# Patient Record
Sex: FEMALE | Race: WHITE | Employment: FULL TIME | URBAN - METROPOLITAN AREA
[De-identification: names, ages, dates, MRNs, and addresses within clinical notes are randomized per-mention and may not be internally consistent; named-entity substitution may affect disease eponyms.]

---

## 2018-04-25 ENCOUNTER — OFFICE VISIT (OUTPATIENT)
Dept: INTERNAL MEDICINE CLINIC | Age: 53
End: 2018-04-25
Payer: COMMERCIAL

## 2018-04-25 VITALS
HEART RATE: 74 BPM | SYSTOLIC BLOOD PRESSURE: 140 MMHG | HEIGHT: 65 IN | WEIGHT: 194.2 LBS | OXYGEN SATURATION: 98 % | DIASTOLIC BLOOD PRESSURE: 90 MMHG | TEMPERATURE: 98.2 F | RESPIRATION RATE: 16 BRPM | BODY MASS INDEX: 32.36 KG/M2

## 2018-04-25 DIAGNOSIS — J40 BRONCHITIS: Primary | ICD-10-CM

## 2018-04-25 PROCEDURE — 99213 OFFICE O/P EST LOW 20 MIN: CPT | Performed by: NURSE PRACTITIONER

## 2018-04-25 RX ORDER — DOXYCYCLINE HYCLATE 100 MG
100 TABLET ORAL 2 TIMES DAILY
Qty: 14 TABLET | Refills: 0 | Status: SHIPPED | OUTPATIENT
Start: 2018-04-25 | End: 2018-05-02

## 2018-04-25 ASSESSMENT — ENCOUNTER SYMPTOMS
RHINORRHEA: 1
NAUSEA: 0
CHEST TIGHTNESS: 0
HEMOPTYSIS: 0
SINUS PRESSURE: 1
ABDOMINAL PAIN: 0
VOMITING: 0
TROUBLE SWALLOWING: 0
COUGH: 1
SHORTNESS OF BREATH: 0
SORE THROAT: 0
DIARRHEA: 0
WHEEZING: 0
HEARTBURN: 0

## 2018-04-25 ASSESSMENT — PATIENT HEALTH QUESTIONNAIRE - PHQ9
SUM OF ALL RESPONSES TO PHQ9 QUESTIONS 1 & 2: 0
2. FEELING DOWN, DEPRESSED OR HOPELESS: 0
SUM OF ALL RESPONSES TO PHQ QUESTIONS 1-9: 0
1. LITTLE INTEREST OR PLEASURE IN DOING THINGS: 0

## 2022-11-03 ENCOUNTER — OUTSIDE SERVICES (OUTPATIENT)
Dept: NEUROLOGY | Age: 57
End: 2022-11-03
Payer: COMMERCIAL

## 2022-11-03 DIAGNOSIS — G56.03 BILATERAL CARPAL TUNNEL SYNDROME: Primary | ICD-10-CM

## 2022-11-03 PROCEDURE — 95886 MUSC TEST DONE W/N TEST COMP: CPT | Performed by: PSYCHIATRY & NEUROLOGY

## 2022-11-03 PROCEDURE — 95913 NRV CNDJ TEST 13/> STUDIES: CPT | Performed by: PSYCHIATRY & NEUROLOGY

## 2023-02-28 ENCOUNTER — DOCUMENTATION (OUTPATIENT)
Dept: PRIMARY CARE | Facility: CLINIC | Age: 58
End: 2023-02-28
Payer: COMMERCIAL

## 2023-03-15 PROBLEM — I48.0 PAROXYSMAL ATRIAL FIBRILLATION WITH RVR (MULTI): Status: ACTIVE | Noted: 2023-03-15

## 2023-03-15 PROBLEM — M21.40 FLAT FOOT: Status: ACTIVE | Noted: 2023-03-15

## 2023-03-15 PROBLEM — M99.9 NONALLOPATHIC LESION OF SACROILIAC REGION: Status: ACTIVE | Noted: 2023-03-15

## 2023-03-15 PROBLEM — E78.5 HYPERLIPIDEMIA: Status: ACTIVE | Noted: 2023-03-15

## 2023-03-15 PROBLEM — G47.33 OBSTRUCTIVE SLEEP APNEA: Status: ACTIVE | Noted: 2023-03-15

## 2023-03-15 PROBLEM — G89.29 NECK PAIN, CHRONIC: Status: ACTIVE | Noted: 2023-03-15

## 2023-03-15 PROBLEM — R53.1 WEAKNESS GENERALIZED: Status: ACTIVE | Noted: 2023-03-15

## 2023-03-15 PROBLEM — M19.071 OSTEOARTHRITIS OF ANKLE, RIGHT: Status: ACTIVE | Noted: 2023-03-15

## 2023-03-15 PROBLEM — M54.2 NECK PAIN, CHRONIC: Status: ACTIVE | Noted: 2023-03-15

## 2023-03-15 PROBLEM — G43.909 MIGRAINES: Status: ACTIVE | Noted: 2023-03-15

## 2023-03-15 PROBLEM — M25.579 ANKLE PAIN: Status: ACTIVE | Noted: 2023-03-15

## 2023-03-15 PROBLEM — M54.9 BACK PAIN: Status: ACTIVE | Noted: 2023-03-15

## 2023-03-15 PROBLEM — D05.11 DUCTAL CARCINOMA IN SITU (DCIS) OF RIGHT BREAST: Status: ACTIVE | Noted: 2023-03-15

## 2023-03-15 PROBLEM — I49.5 SINUS NODE DYSFUNCTION (MULTI): Status: ACTIVE | Noted: 2023-03-15

## 2023-03-15 PROBLEM — R00.2 PALPITATIONS: Status: ACTIVE | Noted: 2023-03-15

## 2023-03-15 PROBLEM — I10 ESSENTIAL HYPERTENSION: Status: ACTIVE | Noted: 2023-03-15

## 2023-03-15 PROBLEM — I87.2 VENOUS INSUFFICIENCY: Status: ACTIVE | Noted: 2023-03-15

## 2023-03-15 PROBLEM — L60.0 INGROWN TOENAIL: Status: ACTIVE | Noted: 2023-03-15

## 2023-03-15 PROBLEM — I89.0 LYMPHEDEMA OF BOTH LOWER EXTREMITIES: Status: ACTIVE | Noted: 2023-03-15

## 2023-03-15 PROBLEM — M54.12 LEFT CERVICAL RADICULOPATHY: Status: ACTIVE | Noted: 2023-03-15

## 2023-03-15 PROBLEM — R09.81 SINUS CONGESTION: Status: ACTIVE | Noted: 2023-03-15

## 2023-03-15 PROBLEM — R09.82 PND (POST-NASAL DRIP): Status: ACTIVE | Noted: 2023-03-15

## 2023-03-15 PROBLEM — R10.9 ABDOMINAL PAIN: Status: ACTIVE | Noted: 2023-03-15

## 2023-03-15 PROBLEM — L85.3 XEROSIS OF SKIN: Status: ACTIVE | Noted: 2023-03-15

## 2023-03-15 PROBLEM — I89.0 LYMPHEDEMA: Status: ACTIVE | Noted: 2023-03-15

## 2023-03-15 PROBLEM — G56.02 CARPAL TUNNEL SYNDROME OF LEFT WRIST: Status: ACTIVE | Noted: 2023-03-15

## 2023-03-15 PROBLEM — G89.18 POST-OP PAIN: Status: ACTIVE | Noted: 2023-03-15

## 2023-03-15 PROBLEM — R91.8 LUNG NODULES: Status: ACTIVE | Noted: 2023-03-15

## 2023-03-15 PROBLEM — F06.4 ANXIETY DISORDER DUE TO KNOWN PHYSIOLOGICAL CONDITION: Status: ACTIVE | Noted: 2023-03-15

## 2023-03-22 ENCOUNTER — TELEPHONE (OUTPATIENT)
Dept: PRIMARY CARE | Facility: CLINIC | Age: 58
End: 2023-03-22
Payer: COMMERCIAL

## 2023-03-22 DIAGNOSIS — E78.5 HYPERLIPIDEMIA, UNSPECIFIED HYPERLIPIDEMIA TYPE: Primary | ICD-10-CM

## 2023-03-23 RX ORDER — ROSUVASTATIN CALCIUM 5 MG/1
5 TABLET, COATED ORAL DAILY
Qty: 90 TABLET | Refills: 0 | Status: SHIPPED | OUTPATIENT
Start: 2023-03-23 | End: 2023-05-19

## 2023-03-23 RX ORDER — ROSUVASTATIN CALCIUM 5 MG/1
1 TABLET, COATED ORAL DAILY
COMMUNITY
Start: 2021-12-07 | End: 2023-03-23 | Stop reason: SDUPTHER

## 2023-04-12 ENCOUNTER — OFFICE VISIT (OUTPATIENT)
Dept: PRIMARY CARE | Facility: CLINIC | Age: 58
End: 2023-04-12
Payer: COMMERCIAL

## 2023-04-12 VITALS
HEIGHT: 65 IN | DIASTOLIC BLOOD PRESSURE: 78 MMHG | TEMPERATURE: 98.4 F | OXYGEN SATURATION: 98 % | RESPIRATION RATE: 16 BRPM | BODY MASS INDEX: 34.49 KG/M2 | HEART RATE: 60 BPM | WEIGHT: 207 LBS | SYSTOLIC BLOOD PRESSURE: 100 MMHG

## 2023-04-12 DIAGNOSIS — M99.9 NONALLOPATHIC LESION OF SACROILIAC REGION: ICD-10-CM

## 2023-04-12 DIAGNOSIS — M54.50 ACUTE RIGHT-SIDED LOW BACK PAIN WITHOUT SCIATICA: ICD-10-CM

## 2023-04-12 DIAGNOSIS — B37.2 MONILIAL INTERTRIGO: Primary | ICD-10-CM

## 2023-04-12 DIAGNOSIS — E66.09 CLASS 1 OBESITY DUE TO EXCESS CALORIES WITH SERIOUS COMORBIDITY AND BODY MASS INDEX (BMI) OF 34.0 TO 34.9 IN ADULT: ICD-10-CM

## 2023-04-12 PROCEDURE — 98925 OSTEOPATH MANJ 1-2 REGIONS: CPT | Performed by: FAMILY MEDICINE

## 2023-04-12 PROCEDURE — 3074F SYST BP LT 130 MM HG: CPT | Performed by: FAMILY MEDICINE

## 2023-04-12 PROCEDURE — 3008F BODY MASS INDEX DOCD: CPT | Performed by: FAMILY MEDICINE

## 2023-04-12 PROCEDURE — 99214 OFFICE O/P EST MOD 30 MIN: CPT | Performed by: FAMILY MEDICINE

## 2023-04-12 PROCEDURE — 1036F TOBACCO NON-USER: CPT | Performed by: FAMILY MEDICINE

## 2023-04-12 PROCEDURE — 3078F DIAST BP <80 MM HG: CPT | Performed by: FAMILY MEDICINE

## 2023-04-12 RX ORDER — MICONAZOLE NITRATE 2 %
POWDER (GRAM) TOPICAL 2 TIMES DAILY
Qty: 85 G | Refills: 11 | Status: SHIPPED | OUTPATIENT
Start: 2023-04-12

## 2023-04-12 RX ORDER — APIXABAN 5 MG/1
1 TABLET, FILM COATED ORAL 2 TIMES DAILY
COMMUNITY
Start: 2019-10-08 | End: 2024-01-29 | Stop reason: SDUPTHER

## 2023-04-12 RX ORDER — LANOLIN ALCOHOL/MO/W.PET/CERES
1 CREAM (GRAM) TOPICAL DAILY
COMMUNITY
Start: 2019-10-08 | End: 2024-01-29 | Stop reason: SDUPTHER

## 2023-04-12 RX ORDER — ACETAMINOPHEN 500 MG
2 TABLET ORAL DAILY
COMMUNITY

## 2023-04-12 RX ORDER — TOPIRAMATE 50 MG/1
50 TABLET, FILM COATED ORAL 2 TIMES DAILY
Qty: 60 TABLET | Refills: 5 | Status: SHIPPED | OUTPATIENT
Start: 2023-04-12 | End: 2023-11-28

## 2023-04-12 RX ORDER — MOMETASONE FUROATE 1 MG/G
CREAM TOPICAL
COMMUNITY
Start: 2023-02-10

## 2023-04-12 RX ORDER — RIZATRIPTAN BENZOATE 10 MG/1
10 TABLET, ORALLY DISINTEGRATING ORAL
COMMUNITY
Start: 2014-12-08 | End: 2023-06-14 | Stop reason: ALTCHOICE

## 2023-04-12 RX ORDER — CALCIUM CARBONATE 500(1250)
1 TABLET ORAL DAILY
COMMUNITY

## 2023-04-12 RX ORDER — UBROGEPANT 100 MG/1
TABLET ORAL ONCE AS NEEDED
COMMUNITY
Start: 2021-12-21 | End: 2023-12-01 | Stop reason: SDUPTHER

## 2023-04-12 RX ORDER — VALSARTAN 320 MG/1
1 TABLET ORAL DAILY
COMMUNITY
Start: 2022-02-22 | End: 2023-05-16

## 2023-04-12 RX ORDER — METOPROLOL SUCCINATE 25 MG/1
1 TABLET, EXTENDED RELEASE ORAL NIGHTLY
COMMUNITY
Start: 2020-05-29 | End: 2024-01-29 | Stop reason: SDUPTHER

## 2023-04-12 RX ORDER — MULTIVITAMIN
1 TABLET ORAL DAILY
COMMUNITY
End: 2023-12-01 | Stop reason: WASHOUT

## 2023-04-12 RX ORDER — DOFETILIDE 0.5 MG/1
1 CAPSULE ORAL 2 TIMES DAILY
COMMUNITY
End: 2024-01-29 | Stop reason: SDUPTHER

## 2023-04-12 RX ORDER — ARTIFICIAL TEARS 1; 2; 3 MG/ML; MG/ML; MG/ML
SOLUTION/ DROPS OPHTHALMIC
COMMUNITY
End: 2023-06-14 | Stop reason: ALTCHOICE

## 2023-04-12 RX ORDER — TOPIRAMATE 25 MG/1
25 TABLET ORAL 2 TIMES DAILY
COMMUNITY
End: 2023-04-12 | Stop reason: DRUGHIGH

## 2023-04-12 ASSESSMENT — ENCOUNTER SYMPTOMS
WHEEZING: 0
COUGH: 0
SPEECH DIFFICULTY: 0
ADENOPATHY: 0
WEAKNESS: 0
VOICE CHANGE: 0
ACTIVITY CHANGE: 0
FACIAL ASYMMETRY: 0
DYSURIA: 0
CHILLS: 0
ABDOMINAL PAIN: 0
PHOTOPHOBIA: 0
PARESTHESIAS: 0
FLANK PAIN: 0
DIAPHORESIS: 0
SLEEP DISTURBANCE: 0
DIARRHEA: 0
EYE DISCHARGE: 0
ABDOMINAL DISTENTION: 0
HEMATURIA: 0
NECK STIFFNESS: 0
RHINORRHEA: 0
PERIANAL NUMBNESS: 0
POLYDIPSIA: 0
NUMBNESS: 0
PARESIS: 0
SEIZURES: 0
ARTHRALGIAS: 0
LEG PAIN: 0
APPETITE CHANGE: 0
FATIGUE: 0
BRUISES/BLEEDS EASILY: 0
DIZZINESS: 0
FEVER: 0
HALLUCINATIONS: 0
LIGHT-HEADEDNESS: 0
BLOOD IN STOOL: 0
WOUND: 0
DYSPHORIC MOOD: 0
CHOKING: 0
EYE ITCHING: 0
CONFUSION: 0
BACK PAIN: 1
FREQUENCY: 0
TROUBLE SWALLOWING: 0
NERVOUS/ANXIOUS: 0
EYE REDNESS: 0
SINUS PRESSURE: 0
BOWEL INCONTINENCE: 0
WEIGHT LOSS: 0
VOMITING: 0
TREMORS: 0
CHEST TIGHTNESS: 0
CONSTIPATION: 0
JOINT SWELLING: 0
NAUSEA: 0
UNEXPECTED WEIGHT CHANGE: 0
HEADACHES: 0
EYE PAIN: 0
AGITATION: 0
SORE THROAT: 0
TINGLING: 0

## 2023-04-12 ASSESSMENT — PAIN SCALES - GENERAL: PAINLEVEL: 3

## 2023-04-12 NOTE — PROGRESS NOTES
Subjective   Patient ID: Aimee Ponce is a 57 y.o. female who presents for Back Pain (Last week/Pt states that she thinks she may need to be adjusted again).  Back Pain  This is a new problem. The current episode started 1 to 4 weeks ago. The problem occurs constantly. The problem is unchanged. The pain is present in the sacro-iliac. The quality of the pain is described as aching and cramping. The pain does not radiate. The pain is moderate. The pain is Worse during the day. The symptoms are aggravated by bending, position and standing. Stiffness is present All day. Pertinent negatives include no abdominal pain, bladder incontinence, bowel incontinence, chest pain, dysuria, fever, headaches, leg pain, numbness, paresis, paresthesias, pelvic pain, perianal numbness, tingling, weakness or weight loss. Risk factors include obesity. She has tried walking and home exercises for the symptoms. The treatment provided mild relief.   Rash  This is a recurrent problem. The current episode started 1 to 4 weeks ago. The problem has been gradually worsening since onset. The affected locations include the torso. The rash is characterized by redness and itchiness. She was exposed to nothing. Pertinent negatives include no congestion, cough, diarrhea, eye pain, fatigue, fever, rhinorrhea, sore throat or vomiting. Treatments tried: Topical antifungal. The treatment provided mild relief.       Review of Systems   Constitutional:  Negative for activity change, appetite change, chills, diaphoresis, fatigue, fever, unexpected weight change and weight loss.   HENT:  Negative for congestion, ear pain, hearing loss, nosebleeds, postnasal drip, rhinorrhea, sinus pressure, sneezing, sore throat, tinnitus, trouble swallowing and voice change.    Eyes:  Negative for photophobia, pain, discharge, redness, itching and visual disturbance.   Respiratory:  Negative for cough, choking, chest tightness and wheezing.    Cardiovascular:  Negative for  "chest pain and leg swelling.   Gastrointestinal:  Negative for abdominal distention, abdominal pain, blood in stool, bowel incontinence, constipation, diarrhea, nausea and vomiting.   Endocrine: Negative for cold intolerance, heat intolerance, polydipsia and polyuria.   Genitourinary:  Negative for bladder incontinence, dysuria, flank pain, frequency, hematuria, pelvic pain and urgency.   Musculoskeletal:  Positive for back pain. Negative for arthralgias, joint swelling and neck stiffness.   Skin:  Positive for rash. Negative for wound.   Allergic/Immunologic: Negative for immunocompromised state.   Neurological:  Negative for dizziness, tingling, tremors, seizures, syncope, facial asymmetry, speech difficulty, weakness, light-headedness, numbness, headaches and paresthesias.   Hematological:  Negative for adenopathy. Does not bruise/bleed easily.   Psychiatric/Behavioral:  Negative for agitation, behavioral problems, confusion, dysphoric mood, hallucinations, self-injury, sleep disturbance and suicidal ideas. The patient is not nervous/anxious.        Objective   /78 (BP Location: Right arm, Patient Position: Sitting, BP Cuff Size: Adult)   Pulse 60   Temp 36.9 °C (98.4 °F) (Temporal)   Resp 16   Ht 1.651 m (5' 5\")   Wt 93.9 kg (207 lb)   SpO2 98%   BMI 34.45 kg/m²   Physical Exam  Constitutional:       General: She is not in acute distress.     Appearance: She is not ill-appearing or diaphoretic.   HENT:      Head: Normocephalic and atraumatic.      Right Ear: External ear normal.      Left Ear: External ear normal.      Nose: Nose normal. No rhinorrhea.   Eyes:      General: Lids are normal. No scleral icterus.        Right eye: No discharge.         Left eye: No discharge.      Conjunctiva/sclera: Conjunctivae normal.   Cardiovascular:      Rate and Rhythm: Normal rate and regular rhythm.      Pulses: Normal pulses.      Heart sounds: No murmur heard.  Pulmonary:      Effort: Pulmonary effort is " normal. No respiratory distress.      Breath sounds: No decreased breath sounds, wheezing, rhonchi or rales.   Abdominal:      General: Bowel sounds are normal. There is no distension.      Palpations: Abdomen is soft. There is no mass.      Tenderness: There is no abdominal tenderness. There is no guarding or rebound.   Musculoskeletal:         General: No swelling or deformity.      Cervical back: Tenderness present. No rigidity.        Back:       Right lower leg: No edema.      Left lower leg: No edema.      Comments: Tenderness reproducible at right SI and gluteus.  Asymmetry of sacral sulcus also notable on exam when compared to the left sacral sulcus   Lymphadenopathy:      Cervical: No cervical adenopathy.      Upper Body:      Right upper body: No supraclavicular adenopathy.      Left upper body: No supraclavicular adenopathy.   Skin:     General: Skin is warm and dry.      Coloration: Skin is not jaundiced or pale.      Findings: Erythema and rash present. No lesion.             Comments: Intertriginous red plaque with central clearing   Neurological:      General: No focal deficit present.      Mental Status: She is alert and oriented to person, place, and time.      Sensory: No sensory deficit.      Motor: No weakness or tremor.      Coordination: Coordination normal.      Gait: Gait normal.   Psychiatric:         Mood and Affect: Mood normal. Affect is not inappropriate.         Behavior: Behavior normal.         Assessment/Plan   Problem List Items Addressed This Visit          Musculoskeletal    Nonallopathic lesion of sacroiliac region    Relevant Orders    Osteopathic Manipulation       Other    Back pain    Relevant Orders    Osteopathic Manipulation     Other Visit Diagnoses       Monilial intertrigo    -  Primary    Relevant Medications    miconazole (Lotrimin AF) 2 % powder    Class 1 obesity due to excess calories with serious comorbidity and body mass index (BMI) of 34.0 to 34.9 in adult         Relevant Medications    topiramate (Topamax) 50 mg tablet

## 2023-05-16 DIAGNOSIS — I10 ESSENTIAL (PRIMARY) HYPERTENSION: ICD-10-CM

## 2023-05-16 RX ORDER — VALSARTAN 320 MG/1
TABLET ORAL
Qty: 90 TABLET | Refills: 1 | Status: SHIPPED | OUTPATIENT
Start: 2023-05-16 | End: 2023-11-28

## 2023-05-19 DIAGNOSIS — E78.5 HYPERLIPIDEMIA, UNSPECIFIED HYPERLIPIDEMIA TYPE: ICD-10-CM

## 2023-05-19 DIAGNOSIS — I10 ESSENTIAL HYPERTENSION: Primary | ICD-10-CM

## 2023-05-19 RX ORDER — HYDRALAZINE HYDROCHLORIDE 50 MG/1
50 TABLET, FILM COATED ORAL 3 TIMES DAILY
Qty: 270 TABLET | Refills: 0 | Status: SHIPPED | OUTPATIENT
Start: 2023-05-19 | End: 2023-08-09 | Stop reason: SDUPTHER

## 2023-05-19 RX ORDER — ROSUVASTATIN CALCIUM 5 MG/1
TABLET, COATED ORAL
Qty: 90 TABLET | Refills: 0 | Status: SHIPPED | OUTPATIENT
Start: 2023-05-19 | End: 2023-08-09 | Stop reason: SDUPTHER

## 2023-05-19 RX ORDER — HYDRALAZINE HYDROCHLORIDE 50 MG/1
1 TABLET, FILM COATED ORAL 3 TIMES DAILY
COMMUNITY
Start: 2022-11-21 | End: 2023-05-19 | Stop reason: SDUPTHER

## 2023-06-02 ENCOUNTER — TELEPHONE (OUTPATIENT)
Dept: PRIMARY CARE | Facility: CLINIC | Age: 58
End: 2023-06-02
Payer: COMMERCIAL

## 2023-06-02 NOTE — TELEPHONE ENCOUNTER
Patient phones the office today w/ concerns about her left eye twitching and is concerned about the cause being some of the medications she is taking.     Patient is currently on magnesium and Vitamin supplements and her medication list is up to date in the system.     Patient is also scheduled to see TW within a couple of weeks. Please advise and contact patient at [phone]. Thank you.

## 2023-06-14 ENCOUNTER — OFFICE VISIT (OUTPATIENT)
Dept: PRIMARY CARE | Facility: CLINIC | Age: 58
End: 2023-06-14
Payer: COMMERCIAL

## 2023-06-14 VITALS
DIASTOLIC BLOOD PRESSURE: 75 MMHG | HEART RATE: 59 BPM | HEIGHT: 65 IN | BODY MASS INDEX: 34.32 KG/M2 | SYSTOLIC BLOOD PRESSURE: 121 MMHG | TEMPERATURE: 97.9 F | OXYGEN SATURATION: 95 % | WEIGHT: 206 LBS | RESPIRATION RATE: 18 BRPM

## 2023-06-14 DIAGNOSIS — Z00.00 ROUTINE GENERAL MEDICAL EXAMINATION AT A HEALTH CARE FACILITY: Primary | ICD-10-CM

## 2023-06-14 DIAGNOSIS — E78.2 MIXED HYPERLIPIDEMIA: ICD-10-CM

## 2023-06-14 DIAGNOSIS — Z78.0 POSTMENOPAUSAL: ICD-10-CM

## 2023-06-14 DIAGNOSIS — I10 ESSENTIAL HYPERTENSION: ICD-10-CM

## 2023-06-14 DIAGNOSIS — R73.02 GLUCOSE INTOLERANCE (IMPAIRED GLUCOSE TOLERANCE): ICD-10-CM

## 2023-06-14 PROCEDURE — 3074F SYST BP LT 130 MM HG: CPT | Performed by: FAMILY MEDICINE

## 2023-06-14 PROCEDURE — 3008F BODY MASS INDEX DOCD: CPT | Performed by: FAMILY MEDICINE

## 2023-06-14 PROCEDURE — 1036F TOBACCO NON-USER: CPT | Performed by: FAMILY MEDICINE

## 2023-06-14 PROCEDURE — 99396 PREV VISIT EST AGE 40-64: CPT | Performed by: FAMILY MEDICINE

## 2023-06-14 PROCEDURE — 3078F DIAST BP <80 MM HG: CPT | Performed by: FAMILY MEDICINE

## 2023-06-14 RX ORDER — MINERAL OIL, PETROLATUM 425; 573 MG/G; MG/G
OINTMENT OPHTHALMIC ONCE
COMMUNITY
End: 2024-04-03 | Stop reason: ALTCHOICE

## 2023-06-14 ASSESSMENT — ENCOUNTER SYMPTOMS
EYE REDNESS: 0
POLYDIPSIA: 0
DIAPHORESIS: 0
DIZZINESS: 0
FATIGUE: 0
NERVOUS/ANXIOUS: 0
COUGH: 0
SEIZURES: 0
MYALGIAS: 0
SLEEP DISTURBANCE: 0
SPEECH DIFFICULTY: 0
BLOOD IN STOOL: 0
WEAKNESS: 0
UNEXPECTED WEIGHT CHANGE: 0
VOICE CHANGE: 0
NUMBNESS: 0
RHINORRHEA: 0
EYE PAIN: 0
HEADACHES: 0
CHOKING: 0
SORE THROAT: 0
CHEST TIGHTNESS: 0
CHILLS: 0
ACTIVITY CHANGE: 0
FEVER: 0
BRUISES/BLEEDS EASILY: 0
APPETITE CHANGE: 0
CONSTIPATION: 0
ABDOMINAL DISTENTION: 0
AGITATION: 0
FACIAL ASYMMETRY: 0
VOMITING: 0
PALPITATIONS: 0
NECK PAIN: 0
NECK STIFFNESS: 0
EYE ITCHING: 0
SINUS PRESSURE: 0
CONFUSION: 0
NAUSEA: 0
TROUBLE SWALLOWING: 0
ABDOMINAL PAIN: 0
LIGHT-HEADEDNESS: 0
FLANK PAIN: 0
TREMORS: 0
FREQUENCY: 0
WOUND: 0
DYSPHORIC MOOD: 0
DYSURIA: 0
SHORTNESS OF BREATH: 0
JOINT SWELLING: 0
DIARRHEA: 0
BACK PAIN: 0
ARTHRALGIAS: 0
HALLUCINATIONS: 0
WHEEZING: 0
EYE DISCHARGE: 0
PHOTOPHOBIA: 0
HEMATURIA: 0
ADENOPATHY: 0

## 2023-06-14 NOTE — LETTER
July 27, 2023     Simi TAI Lenachely  [address]  [place] [address]      Dear Ms. Osman:    Below are the results from your recent visit:    No x-ray findings that require immediate attention. We will review at next visit. If you have concerns that you desire to address sooner than next regular visit, please schedule appointment.     Resulted Orders   XR DEXA bone density    Narrative    Interpreted By:  KEYSHA BARRY MD  MRN: [id number]  Patient Name: SIMI OSMAN     STUDY:  BONE DENSITY, DEXA 1 OR MORE SITES: AXIAL SKELETN7/26/2023 11:58 am     INDICATION:  scrnThe patient is a 57 year old female for a screening bone  Densitometry (DEXA).     COMPARISON:  None.     ACCESSION NUMBER(S):  [id number]     ORDERING CLINICIAN:  LANG WU     TECHNIQUE:  Bone Densitometry (DEXA) of the lumbar spine and left hip  performed.     FINDINGS:  Name: SIMI OSMAN  Patient ID:[id number]  YOB: 1965  Height:165.1  Gender:F  Exam Date:7/26/2023  Weight:94.1  Indications:scrn  Fractures:None  Treatments:None        LEFT FEMUR -TOTAL  Bone Mineral Density: 1.147 g/cm2  T-Score 1.1 Z-Score 1.8     RIGHT FEMUR TOTAL  Bone Mineral Density: 1.148 g/cm2  T-Score 1.1 Z-Score 1.9     SPINE L1-L4  Bone Mineral Density: 1.468 g/cm2  T-Score 2.0 Z-Score 3.0           World Health Organization (WHO) criteria for post-menopausal,   Women:  Normal:         T-score at or above -1 SD  Osteopenia:   T-score between -1 and -2.5 SD  Osteoporosis: T-score at or below -2.5 SD        10-Year Fracture Risk:  FRAX NA             Impression    According to World Health Organization criteria, classification is  normal.     Followup recommended in 2 years or sooner as clinically warranted.     If you have any questions or concerns, please don't hesitate to call.      Sincerely,      Lang Wu, DO  
Never smoker

## 2023-07-05 ENCOUNTER — OFFICE VISIT (OUTPATIENT)
Dept: PRIMARY CARE | Facility: CLINIC | Age: 58
End: 2023-07-05
Payer: COMMERCIAL

## 2023-07-05 VITALS
BODY MASS INDEX: 34.49 KG/M2 | WEIGHT: 207 LBS | SYSTOLIC BLOOD PRESSURE: 122 MMHG | RESPIRATION RATE: 18 BRPM | DIASTOLIC BLOOD PRESSURE: 83 MMHG | OXYGEN SATURATION: 97 % | HEART RATE: 54 BPM | HEIGHT: 65 IN | TEMPERATURE: 97.7 F

## 2023-07-05 DIAGNOSIS — I48.0 PAROXYSMAL ATRIAL FIBRILLATION WITH RVR (MULTI): Primary | ICD-10-CM

## 2023-07-05 DIAGNOSIS — E66.09 CLASS 1 OBESITY DUE TO EXCESS CALORIES WITH SERIOUS COMORBIDITY AND BODY MASS INDEX (BMI) OF 34.0 TO 34.9 IN ADULT: ICD-10-CM

## 2023-07-05 DIAGNOSIS — L21.9 SEBORRHEA: ICD-10-CM

## 2023-07-05 PROCEDURE — 99496 TRANSJ CARE MGMT HIGH F2F 7D: CPT | Performed by: FAMILY MEDICINE

## 2023-07-05 PROCEDURE — 1036F TOBACCO NON-USER: CPT | Performed by: FAMILY MEDICINE

## 2023-07-05 PROCEDURE — 3074F SYST BP LT 130 MM HG: CPT | Performed by: FAMILY MEDICINE

## 2023-07-05 PROCEDURE — 3079F DIAST BP 80-89 MM HG: CPT | Performed by: FAMILY MEDICINE

## 2023-07-05 PROCEDURE — 3008F BODY MASS INDEX DOCD: CPT | Performed by: FAMILY MEDICINE

## 2023-07-05 RX ORDER — KETOCONAZOLE 20 MG/G
CREAM TOPICAL 2 TIMES DAILY
Qty: 30 G | Refills: 11 | Status: SHIPPED | OUTPATIENT
Start: 2023-07-05 | End: 2023-07-05 | Stop reason: SDUPTHER

## 2023-07-05 RX ORDER — KETOCONAZOLE 20 MG/G
CREAM TOPICAL 2 TIMES DAILY
Qty: 30 G | Refills: 11 | Status: SHIPPED | OUTPATIENT
Start: 2023-07-05 | End: 2024-07-04

## 2023-07-05 ASSESSMENT — ENCOUNTER SYMPTOMS
BACK PAIN: 0
HEMATURIA: 0
FATIGUE: 1
POLYDIPSIA: 0
FLANK PAIN: 0
NUMBNESS: 0
SEIZURES: 0
JOINT SWELLING: 0
NECK STIFFNESS: 0
VOICE CHANGE: 0
CHEST TIGHTNESS: 0
TROUBLE SWALLOWING: 0
FEVER: 0
NERVOUS/ANXIOUS: 0
NAUSEA: 0
DIAPHORESIS: 0
COUGH: 0
CHILLS: 0
HEADACHES: 0
EYE ITCHING: 0
NECK PAIN: 0
APPETITE CHANGE: 0
ABDOMINAL PAIN: 0
EYE PAIN: 0
MYALGIAS: 0
DYSPHORIC MOOD: 0
SLEEP DISTURBANCE: 0
CONFUSION: 0
BLOOD IN STOOL: 0
TREMORS: 0
DIARRHEA: 0
SPEECH DIFFICULTY: 0
AGITATION: 0
ADENOPATHY: 0
SORE THROAT: 0
FACIAL ASYMMETRY: 0
RHINORRHEA: 0
WHEEZING: 0
PALPITATIONS: 0
HALLUCINATIONS: 0
WOUND: 0
BRUISES/BLEEDS EASILY: 0
CONSTIPATION: 0
UNEXPECTED WEIGHT CHANGE: 0
EYE DISCHARGE: 0
SHORTNESS OF BREATH: 0
DIZZINESS: 0
ABDOMINAL DISTENTION: 0
ARTHRALGIAS: 0
ACTIVITY CHANGE: 0
VOMITING: 0
PHOTOPHOBIA: 0
CHOKING: 0
EYE REDNESS: 0
LIGHT-HEADEDNESS: 0
DYSURIA: 0
SINUS PRESSURE: 0
FREQUENCY: 0
WEAKNESS: 0

## 2023-07-05 NOTE — PROGRESS NOTES
Patient: Aimee Ponce  : 1965  PCP: Lang Wu DO  MRN: [id number]  Program: No linked episodes         Physical Exam    Assessment/Plan       Review of Systems    Family History   Problem Relation Name Age of Onset    Hypertension Mother      Stroke Father      Hypertension Father         No data recorded    No follow-ups on file.

## 2023-07-06 ENCOUNTER — PATIENT OUTREACH (OUTPATIENT)
Dept: CARE COORDINATION | Facility: CLINIC | Age: 58
End: 2023-07-06
Payer: COMMERCIAL

## 2023-07-07 ENCOUNTER — PATIENT OUTREACH (OUTPATIENT)
Dept: CARE COORDINATION | Facility: CLINIC | Age: 58
End: 2023-07-07
Payer: COMMERCIAL

## 2023-07-07 NOTE — PROGRESS NOTES
MARILY call attempted x2, messages left for call back.  Aimee does have follow up cardiology appt scheduled, unable to determine if she has scheduled an appt with her PCP

## 2023-07-17 ENCOUNTER — TELEPHONE (OUTPATIENT)
Dept: PRIMARY CARE | Facility: CLINIC | Age: 58
End: 2023-07-17
Payer: COMMERCIAL

## 2023-07-18 ENCOUNTER — TELEPHONE (OUTPATIENT)
Dept: PRIMARY CARE | Facility: CLINIC | Age: 58
End: 2023-07-18
Payer: COMMERCIAL

## 2023-07-18 NOTE — TELEPHONE ENCOUNTER
Pt stated Covid home test, I did instruct pt to schedule VV for [facility] so they could assess the pt & treat. Pt will call to schedule.

## 2023-08-09 DIAGNOSIS — E78.5 HYPERLIPIDEMIA, UNSPECIFIED HYPERLIPIDEMIA TYPE: ICD-10-CM

## 2023-08-09 DIAGNOSIS — I10 ESSENTIAL HYPERTENSION: ICD-10-CM

## 2023-08-09 RX ORDER — ROSUVASTATIN CALCIUM 5 MG/1
5 TABLET, COATED ORAL DAILY
Qty: 90 TABLET | Refills: 0 | Status: SHIPPED | OUTPATIENT
Start: 2023-08-09 | End: 2023-11-28

## 2023-08-09 RX ORDER — HYDRALAZINE HYDROCHLORIDE 50 MG/1
50 TABLET, FILM COATED ORAL 3 TIMES DAILY
Qty: 270 TABLET | Refills: 0 | Status: SHIPPED | OUTPATIENT
Start: 2023-08-09 | End: 2023-11-28

## 2023-08-09 NOTE — TELEPHONE ENCOUNTER
Bryce pt needs refill  hydralazine 50mg, 1 tab 3 times daily  Rosuvastatin 5mg, 1 tab daily  [facility] in [place] on [address] , 90 day supply  Pt's # [phone]

## 2023-08-11 ENCOUNTER — HOSPITAL ENCOUNTER (OUTPATIENT)
Dept: DATA CONVERSION | Facility: HOSPITAL | Age: 58
End: 2023-08-11
Attending: INTERNAL MEDICINE | Admitting: INTERNAL MEDICINE
Payer: COMMERCIAL

## 2023-08-11 DIAGNOSIS — Z12.11 ENCOUNTER FOR SCREENING FOR MALIGNANT NEOPLASM OF COLON: ICD-10-CM

## 2023-08-11 DIAGNOSIS — Z79.01 LONG TERM (CURRENT) USE OF ANTICOAGULANTS: ICD-10-CM

## 2023-09-29 VITALS — BODY MASS INDEX: 32.21 KG/M2 | HEIGHT: 65 IN | WEIGHT: 193.34 LBS

## 2023-10-24 ENCOUNTER — TELEPHONE (OUTPATIENT)
Dept: PRIMARY CARE | Facility: CLINIC | Age: 58
End: 2023-10-24
Payer: COMMERCIAL

## 2023-11-07 ENCOUNTER — HOSPITAL ENCOUNTER (OUTPATIENT)
Dept: RADIOLOGY | Facility: HOSPITAL | Age: 58
Discharge: HOME | End: 2023-11-07
Payer: COMMERCIAL

## 2023-11-07 DIAGNOSIS — D05.11 INTRADUCTAL CARCINOMA IN SITU OF RIGHT BREAST: ICD-10-CM

## 2023-11-07 PROCEDURE — 77062 BREAST TOMOSYNTHESIS BI: CPT

## 2023-11-07 PROCEDURE — 77062 BREAST TOMOSYNTHESIS BI: CPT | Performed by: RADIOLOGY

## 2023-11-07 PROCEDURE — 77066 DX MAMMO INCL CAD BI: CPT | Performed by: RADIOLOGY

## 2023-11-08 ENCOUNTER — APPOINTMENT (OUTPATIENT)
Dept: SURGERY | Facility: HOSPITAL | Age: 58
End: 2023-11-08
Payer: COMMERCIAL

## 2023-11-24 ENCOUNTER — TELEPHONE (OUTPATIENT)
Dept: PRIMARY CARE | Facility: CLINIC | Age: 58
End: 2023-11-24
Payer: COMMERCIAL

## 2023-12-01 ENCOUNTER — PHARMACY VISIT (OUTPATIENT)
Dept: PHARMACY | Facility: CLINIC | Age: 58
End: 2023-12-01
Payer: COMMERCIAL

## 2023-12-01 ENCOUNTER — SPECIALTY PHARMACY (OUTPATIENT)
Dept: PHARMACY | Facility: CLINIC | Age: 58
End: 2023-12-01

## 2023-12-01 ENCOUNTER — OFFICE VISIT (OUTPATIENT)
Dept: NEUROLOGY | Facility: CLINIC | Age: 58
End: 2023-12-01
Payer: COMMERCIAL

## 2023-12-01 VITALS
DIASTOLIC BLOOD PRESSURE: 90 MMHG | WEIGHT: 184.9 LBS | BODY MASS INDEX: 30.81 KG/M2 | HEART RATE: 64 BPM | SYSTOLIC BLOOD PRESSURE: 142 MMHG | HEIGHT: 65 IN

## 2023-12-01 DIAGNOSIS — G43.009 MIGRAINE WITHOUT AURA AND WITHOUT STATUS MIGRAINOSUS, NOT INTRACTABLE: Primary | ICD-10-CM

## 2023-12-01 PROCEDURE — 99214 OFFICE O/P EST MOD 30 MIN: CPT | Performed by: NURSE PRACTITIONER

## 2023-12-01 PROCEDURE — 1036F TOBACCO NON-USER: CPT | Performed by: NURSE PRACTITIONER

## 2023-12-01 PROCEDURE — 3008F BODY MASS INDEX DOCD: CPT | Performed by: NURSE PRACTITIONER

## 2023-12-01 PROCEDURE — RXMED WILLOW AMBULATORY MEDICATION CHARGE

## 2023-12-01 PROCEDURE — 3077F SYST BP >= 140 MM HG: CPT | Performed by: NURSE PRACTITIONER

## 2023-12-01 PROCEDURE — 3080F DIAST BP >= 90 MM HG: CPT | Performed by: NURSE PRACTITIONER

## 2023-12-01 RX ORDER — METAXALONE 800 MG/1
800 TABLET ORAL DAILY PRN
COMMUNITY
Start: 2023-02-24

## 2023-12-01 RX ORDER — UBROGEPANT 100 MG/1
100 TABLET ORAL AS NEEDED
Qty: 16 TABLET | Refills: 5 | Status: SHIPPED | OUTPATIENT
Start: 2023-12-01

## 2023-12-01 RX ORDER — DOCUSATE SODIUM 100 MG/1
2 CAPSULE, LIQUID FILLED ORAL DAILY
COMMUNITY

## 2023-12-01 RX ORDER — MELATONIN 1 MG
2 TABLET,CHEWABLE ORAL NIGHTLY
COMMUNITY

## 2023-12-01 RX ORDER — SEMAGLUTIDE 1.34 MG/ML
2 INJECTION, SOLUTION SUBCUTANEOUS
COMMUNITY

## 2023-12-01 RX ORDER — ONDANSETRON 4 MG/1
4 TABLET, ORALLY DISINTEGRATING ORAL 3 TIMES DAILY PRN
COMMUNITY
End: 2024-05-30 | Stop reason: SDUPTHER

## 2023-12-01 ASSESSMENT — ENCOUNTER SYMPTOMS
LOSS OF SENSATION IN FEET: 0
DEPRESSION: 0
OCCASIONAL FEELINGS OF UNSTEADINESS: 0

## 2023-12-01 ASSESSMENT — PATIENT HEALTH QUESTIONNAIRE - PHQ9
2. FEELING DOWN, DEPRESSED OR HOPELESS: NOT AT ALL
SUM OF ALL RESPONSES TO PHQ9 QUESTIONS 1 AND 2: 0
1. LITTLE INTEREST OR PLEASURE IN DOING THINGS: NOT AT ALL

## 2023-12-04 ENCOUNTER — SPECIALTY PHARMACY (OUTPATIENT)
Dept: PHARMACY | Facility: CLINIC | Age: 58
End: 2023-12-04

## 2023-12-19 DIAGNOSIS — I10 ESSENTIAL HYPERTENSION: ICD-10-CM

## 2023-12-19 DIAGNOSIS — E66.09 CLASS 1 OBESITY DUE TO EXCESS CALORIES WITH SERIOUS COMORBIDITY AND BODY MASS INDEX (BMI) OF 34.0 TO 34.9 IN ADULT: ICD-10-CM

## 2023-12-19 RX ORDER — TOPIRAMATE 50 MG/1
50 TABLET, FILM COATED ORAL 2 TIMES DAILY
Qty: 90 TABLET | Refills: 0 | Status: SHIPPED | OUTPATIENT
Start: 2023-12-19 | End: 2024-02-27

## 2023-12-19 RX ORDER — HYDRALAZINE HYDROCHLORIDE 50 MG/1
50 TABLET, FILM COATED ORAL 3 TIMES DAILY
Qty: 270 TABLET | Refills: 0 | Status: SHIPPED | OUTPATIENT
Start: 2023-12-19 | End: 2024-04-03 | Stop reason: SDUPTHER

## 2023-12-20 ENCOUNTER — TELEPHONE (OUTPATIENT)
Dept: CARDIOLOGY | Facility: CLINIC | Age: 58
End: 2023-12-20
Payer: COMMERCIAL

## 2023-12-20 NOTE — TELEPHONE ENCOUNTER
Pt called office stating that she had a bloody nose this am around 11 am.  Per pt it took about 15 minutes to get nose to quit bleeding.  Pt states that she has a humidifier running in her house.  Pt is wondering if she should try using saline in her nose to help with moisture.    Per pt this was the first time she has had a nose blood.    Pt is currently on Eliquis 5 mg 1 tablet twice daily.    Please advise

## 2023-12-21 ENCOUNTER — HOSPITAL ENCOUNTER (EMERGENCY)
Facility: HOSPITAL | Age: 58
Discharge: HOME | End: 2023-12-21
Payer: COMMERCIAL

## 2023-12-21 VITALS
HEART RATE: 80 BPM | TEMPERATURE: 96.8 F | WEIGHT: 183 LBS | OXYGEN SATURATION: 98 % | RESPIRATION RATE: 18 BRPM | HEIGHT: 60 IN | BODY MASS INDEX: 35.93 KG/M2 | SYSTOLIC BLOOD PRESSURE: 161 MMHG | DIASTOLIC BLOOD PRESSURE: 93 MMHG

## 2023-12-21 DIAGNOSIS — E66.09 OTHER OBESITY DUE TO EXCESS CALORIES: ICD-10-CM

## 2023-12-21 DIAGNOSIS — R04.0 EPISTAXIS: Primary | ICD-10-CM

## 2023-12-21 LAB
APTT PPP: 34 SECONDS (ref 27–38)
BASOPHILS # BLD AUTO: 0.06 X10*3/UL (ref 0–0.1)
BASOPHILS NFR BLD AUTO: 0.7 %
EOSINOPHIL # BLD AUTO: 0.12 X10*3/UL (ref 0–0.7)
EOSINOPHIL NFR BLD AUTO: 1.5 %
ERYTHROCYTE [DISTWIDTH] IN BLOOD BY AUTOMATED COUNT: 12 % (ref 11.5–14.5)
HCT VFR BLD AUTO: 43.5 % (ref 36–46)
HGB BLD-MCNC: 14.2 G/DL (ref 12–16)
HOLD SPECIMEN: NORMAL
IMM GRANULOCYTES # BLD AUTO: 0.03 X10*3/UL (ref 0–0.7)
IMM GRANULOCYTES NFR BLD AUTO: 0.4 % (ref 0–0.9)
INR PPP: 1.2 (ref 0.9–1.1)
LYMPHOCYTES # BLD AUTO: 2.02 X10*3/UL (ref 1.2–4.8)
LYMPHOCYTES NFR BLD AUTO: 24.7 %
MCH RBC QN AUTO: 31.3 PG (ref 26–34)
MCHC RBC AUTO-ENTMCNC: 32.6 G/DL (ref 32–36)
MCV RBC AUTO: 96 FL (ref 80–100)
MONOCYTES # BLD AUTO: 0.95 X10*3/UL (ref 0.1–1)
MONOCYTES NFR BLD AUTO: 11.6 %
NEUTROPHILS # BLD AUTO: 5.01 X10*3/UL (ref 1.2–7.7)
NEUTROPHILS NFR BLD AUTO: 61.1 %
NRBC BLD-RTO: 0 /100 WBCS (ref 0–0)
PLATELET # BLD AUTO: 284 X10*3/UL (ref 150–450)
PROTHROMBIN TIME: 13.7 SECONDS (ref 9.8–12.8)
RBC # BLD AUTO: 4.54 X10*6/UL (ref 4–5.2)
WBC # BLD AUTO: 8.2 X10*3/UL (ref 4.4–11.3)

## 2023-12-21 PROCEDURE — 99284 EMERGENCY DEPT VISIT MOD MDM: CPT

## 2023-12-21 PROCEDURE — 36415 COLL VENOUS BLD VENIPUNCTURE: CPT | Performed by: PHYSICIAN ASSISTANT

## 2023-12-21 PROCEDURE — 2500000001 HC RX 250 WO HCPCS SELF ADMINISTERED DRUGS (ALT 637 FOR MEDICARE OP): Performed by: PHYSICIAN ASSISTANT

## 2023-12-21 PROCEDURE — 2500000005 HC RX 250 GENERAL PHARMACY W/O HCPCS: Performed by: PHYSICIAN ASSISTANT

## 2023-12-21 PROCEDURE — 2500000001 HC RX 250 WO HCPCS SELF ADMINISTERED DRUGS (ALT 637 FOR MEDICARE OP)

## 2023-12-21 PROCEDURE — 30905 CONTROL OF NOSEBLEED: CPT

## 2023-12-21 PROCEDURE — 99283 EMERGENCY DEPT VISIT LOW MDM: CPT | Mod: 25

## 2023-12-21 PROCEDURE — 85730 THROMBOPLASTIN TIME PARTIAL: CPT | Performed by: PHYSICIAN ASSISTANT

## 2023-12-21 PROCEDURE — 85025 COMPLETE CBC W/AUTO DIFF WBC: CPT | Performed by: PHYSICIAN ASSISTANT

## 2023-12-21 PROCEDURE — 85610 PROTHROMBIN TIME: CPT | Performed by: PHYSICIAN ASSISTANT

## 2023-12-21 RX ORDER — OXYMETAZOLINE HCL 0.05 %
2 SPRAY, NON-AEROSOL (ML) NASAL ONCE
Status: COMPLETED | OUTPATIENT
Start: 2023-12-21 | End: 2023-12-21

## 2023-12-21 RX ORDER — OXYMETAZOLINE HCL 0.05 %
SPRAY, NON-AEROSOL (ML) NASAL
Status: COMPLETED
Start: 2023-12-21 | End: 2023-12-21

## 2023-12-21 RX ORDER — ONDANSETRON 4 MG/1
4 TABLET, ORALLY DISINTEGRATING ORAL ONCE
Status: COMPLETED | OUTPATIENT
Start: 2023-12-21 | End: 2023-12-21

## 2023-12-21 RX ORDER — CEPHALEXIN 500 MG/1
500 CAPSULE ORAL ONCE
Status: COMPLETED | OUTPATIENT
Start: 2023-12-21 | End: 2023-12-21

## 2023-12-21 RX ADMIN — ONDANSETRON 4 MG: 4 TABLET, ORALLY DISINTEGRATING ORAL at 20:13

## 2023-12-21 RX ADMIN — CEPHALEXIN 500 MG: 500 CAPSULE ORAL at 21:03

## 2023-12-21 RX ADMIN — OXYMETAZOLINE HYDROCHLORIDE 2 SPRAY: 0.05 SPRAY NASAL at 20:13

## 2023-12-21 RX ADMIN — Medication 2 SPRAY: at 20:13

## 2023-12-21 ASSESSMENT — ENCOUNTER SYMPTOMS
SORE THROAT: 0
ARTHRALGIAS: 0
ABDOMINAL PAIN: 0
COUGH: 0
VOMITING: 0
SHORTNESS OF BREATH: 0
DYSURIA: 0
CHILLS: 0
FEVER: 0
HEMATURIA: 0
SEIZURES: 0
EYE PAIN: 0
PALPITATIONS: 0
COLOR CHANGE: 0
BACK PAIN: 0

## 2023-12-21 ASSESSMENT — COLUMBIA-SUICIDE SEVERITY RATING SCALE - C-SSRS
2. HAVE YOU ACTUALLY HAD ANY THOUGHTS OF KILLING YOURSELF?: NO
6. HAVE YOU EVER DONE ANYTHING, STARTED TO DO ANYTHING, OR PREPARED TO DO ANYTHING TO END YOUR LIFE?: NO
1. IN THE PAST MONTH, HAVE YOU WISHED YOU WERE DEAD OR WISHED YOU COULD GO TO SLEEP AND NOT WAKE UP?: NO

## 2023-12-21 ASSESSMENT — PAIN SCALES - GENERAL: PAINLEVEL_OUTOF10: 0 - NO PAIN

## 2023-12-21 ASSESSMENT — PAIN - FUNCTIONAL ASSESSMENT: PAIN_FUNCTIONAL_ASSESSMENT: 0-10

## 2023-12-21 NOTE — TELEPHONE ENCOUNTER
Patient called and stated that she has a 2nd bloody nose at midnight last night. She pinched her nose and it stopped within 20 in.  Her bp at that time was 161/90, this a.m. prior to her morning meds it was 158/90.  She has to go to work and is worried it will happen again and would like a all  back with direction on what to do.  [phone].

## 2023-12-22 RX ORDER — TOPIRAMATE 25 MG/1
25 TABLET ORAL 2 TIMES DAILY
Qty: 120 TABLET | Refills: 5 | OUTPATIENT
Start: 2023-12-22

## 2023-12-22 NOTE — ED PROVIDER NOTES
"Patient is a 58-year-old female who presents to the emergency department with a chief complaint of a nosebleed.  She states that her nose started bleeding around 1700 this evening.  She states that yesterday she also had a nosebleed.  This is her third episode.  She is on Eliquis and states that she has been on Eliquis for \"quite some time, years.  She denies any injury/trauma.  She denies any fever or chills.  No alleviating or exacerbating factors.           Review of Systems   Constitutional:  Negative for chills and fever.   HENT:  Positive for nosebleeds. Negative for ear pain and sore throat.    Eyes:  Negative for pain and visual disturbance.   Respiratory:  Negative for cough and shortness of breath.    Cardiovascular:  Negative for chest pain and palpitations.   Gastrointestinal:  Negative for abdominal pain and vomiting.   Genitourinary:  Negative for dysuria and hematuria.   Musculoskeletal:  Negative for arthralgias and back pain.   Skin:  Negative for color change and rash.   Neurological:  Negative for seizures and syncope.   All other systems reviewed and are negative.       Physical Exam  Vitals and nursing note reviewed.   Constitutional:       General: She is not in acute distress.     Appearance: She is well-developed.   HENT:      Head: Normocephalic and atraumatic.      Nose: No nasal deformity, signs of injury, laceration or nasal tenderness.      Right Nostril: Epistaxis present. No foreign body, septal hematoma or occlusion.      Left Nostril: No foreign body, epistaxis, septal hematoma or occlusion.      Mouth/Throat:      Mouth: Mucous membranes are moist.   Eyes:      Extraocular Movements: Extraocular movements intact.      Conjunctiva/sclera: Conjunctivae normal.   Cardiovascular:      Rate and Rhythm: Normal rate and regular rhythm.      Heart sounds: No murmur heard.  Pulmonary:      Effort: Pulmonary effort is normal. No respiratory distress.      Breath sounds: Normal breath sounds. "   Abdominal:      Palpations: Abdomen is soft.   Musculoskeletal:         General: No swelling. Normal range of motion.      Cervical back: Normal range of motion and neck supple. No rigidity.   Skin:     General: Skin is warm and dry.      Capillary Refill: Capillary refill takes less than 2 seconds.   Neurological:      General: No focal deficit present.      Mental Status: She is alert and oriented to person, place, and time.   Psychiatric:         Mood and Affect: Mood normal.          Labs Reviewed   PROTIME-INR - Abnormal       Result Value    Protime 13.7 (*)     INR 1.2 (*)    APTT - Normal    aPTT 34      Narrative:     The APTT is no longer used for monitoring Unfractionated Heparin Therapy. For monitoring Heparin Therapy, use the Heparin Assay.   CBC WITH AUTO DIFFERENTIAL    WBC 8.2      nRBC 0.0      RBC 4.54      Hemoglobin 14.2      Hematocrit 43.5      MCV 96      MCH 31.3      MCHC 32.6      RDW 12.0      Platelets 284      Neutrophils % 61.1      Immature Granulocytes %, Automated 0.4      Lymphocytes % 24.7      Monocytes % 11.6      Eosinophils % 1.5      Basophils % 0.7      Neutrophils Absolute 5.01      Immature Granulocytes Absolute, Automated 0.03      Lymphocytes Absolute 2.02      Monocytes Absolute 0.95      Eosinophils Absolute 0.12      Basophils Absolute 0.06     GREEN TOP        No orders to display        Procedures     Medical Decision Making  Patient is a 58-year-old female who presents to the emergency department with a chief complaint of a nosebleed to her right nare.  Upon examination patient has a significant amount of bleeding from her right nare.  There was a large clot that was evacuated.  No evidence of a septal hematoma.  No evidence of bleeding from her left nare.  Afrin was applied after pressure.  7-1/2 Rhino Rocket placed.  At this time bleeding has stopped.  Patient placed on Keflex for prophylactic antibiotics for the Rhino Rocket.  She was instructed to follow-up  with Dr. Byrd first thing tomorrow if possible. I did obtain a cbc and coags which were unremarkable.  Differential diagnosis includes but not limited to nasal injury, nosebleed, coagulopathy disorder.    Amount and/or Complexity of Data Reviewed  Labs: ordered. Decision-making details documented in ED Course.    Risk  Prescription drug management.         Diagnoses as of 12/21/23 2124   Epistaxis                    Blanka Gutierrez PA-C  12/21/23 2124

## 2023-12-26 ENCOUNTER — TELEPHONE (OUTPATIENT)
Dept: CARDIOLOGY | Facility: CLINIC | Age: 58
End: 2023-12-26
Payer: COMMERCIAL

## 2023-12-26 NOTE — TELEPHONE ENCOUNTER
Patient went to ENT who told her cauterized her nose again and would like her to go off the Eliquis for a few days.  Is this okay with you?

## 2023-12-26 NOTE — TELEPHONE ENCOUNTER
Per DR. Sukhi Ferrer M.D. patient can hold Eliquis for 3-4 days since she just had a cauterization of her nose for nose bleeds. She will hold it today through Friday and resume on Saturday.

## 2023-12-28 ENCOUNTER — TELEPHONE (OUTPATIENT)
Dept: CARDIOLOGY | Facility: CLINIC | Age: 58
End: 2023-12-28
Payer: COMMERCIAL

## 2023-12-28 NOTE — TELEPHONE ENCOUNTER
Called patient back and instructed her to contact her ENT for the intermittent FMLA if it is needed as the afib isn't causing her to be off work. Patient has been on Eliquis for over 4 years and these nosebleeds have just started recently. Per our conversation on 12/22/2023 Dr. Ferrer stated she needed to remain on the Eliquis and if she continue to have the nosebleeds after cauterization, we would bring her in for an appointment to discuss the watchman device. Patient was scheduled for an appointment with Dr. Ferrer on 12/29 at 0930.

## 2023-12-28 NOTE — TELEPHONE ENCOUNTER
Pt left message wanting to know if she would be able to get intermittent FMLA for her afib.  Per pt she has nose bleed and ended up having to call off work last week and it was a holiday week.      Pt is asking for return call at [phone]

## 2023-12-29 ENCOUNTER — OFFICE VISIT (OUTPATIENT)
Dept: CARDIOLOGY | Facility: CLINIC | Age: 58
End: 2023-12-29
Payer: COMMERCIAL

## 2023-12-29 VITALS
BODY MASS INDEX: 36.52 KG/M2 | HEIGHT: 60 IN | HEART RATE: 63 BPM | WEIGHT: 186 LBS | SYSTOLIC BLOOD PRESSURE: 122 MMHG | DIASTOLIC BLOOD PRESSURE: 78 MMHG

## 2023-12-29 DIAGNOSIS — Z79.01 ANTICOAGULATION MANAGEMENT ENCOUNTER: ICD-10-CM

## 2023-12-29 DIAGNOSIS — R04.0 EPISTAXIS: ICD-10-CM

## 2023-12-29 DIAGNOSIS — R94.31 ABNORMAL EKG: ICD-10-CM

## 2023-12-29 DIAGNOSIS — R00.2 PALPITATIONS: ICD-10-CM

## 2023-12-29 DIAGNOSIS — I48.11 LONGSTANDING PERSISTENT ATRIAL FIBRILLATION (MULTI): Primary | ICD-10-CM

## 2023-12-29 DIAGNOSIS — Z51.81 ANTICOAGULATION MANAGEMENT ENCOUNTER: ICD-10-CM

## 2023-12-29 DIAGNOSIS — Z78.9 NEVER SMOKED TOBACCO: ICD-10-CM

## 2023-12-29 DIAGNOSIS — Z78.9 NEVER SMOKED CIGARETTES: ICD-10-CM

## 2023-12-29 PROCEDURE — 93000 ELECTROCARDIOGRAM COMPLETE: CPT | Performed by: INTERNAL MEDICINE

## 2023-12-29 PROCEDURE — 1036F TOBACCO NON-USER: CPT | Performed by: INTERNAL MEDICINE

## 2023-12-29 PROCEDURE — 3078F DIAST BP <80 MM HG: CPT | Performed by: INTERNAL MEDICINE

## 2023-12-29 PROCEDURE — 99215 OFFICE O/P EST HI 40 MIN: CPT | Performed by: INTERNAL MEDICINE

## 2023-12-29 PROCEDURE — 3074F SYST BP LT 130 MM HG: CPT | Performed by: INTERNAL MEDICINE

## 2023-12-29 PROCEDURE — 3008F BODY MASS INDEX DOCD: CPT | Performed by: INTERNAL MEDICINE

## 2023-12-29 ASSESSMENT — ENCOUNTER SYMPTOMS
PALPITATIONS: 1
DYSPNEA ON EXERTION: 0

## 2023-12-29 NOTE — PROGRESS NOTES
CARDIOLOGY OFFICE VISIT      CHIEF COMPLAINT  Chief Complaint   Patient presents with    Follow-up     Discuss Aly randle.       HISTORY OF PRESENT ILLNESS  HPI    58-year-old  female who is followed for paroxysmal atrial fibrillation controlled on dofetilide, metoprolol, magnesium oxide, and anticoagulated with Eliquis. She underwent partial right mastectomy on May 5, 2022 followed by radiation therapy. She completed her radiation therapy in July, 2022.    Patient presented to emergency department complaining of palpitations February 2023. She was found to be in atrial fibrillation with ventricular response. She states that she was at home and then suddenly she had a cold drink and few minutes later she started noticing palpitations.    Patient was transferred to [facility] and on the way to the hospital, she self converted to sinus rhythm. She was kept in observation. No adjustments of medical therapy were performed. Echocardiogram in March 2023 shows left ventricular ejection fraction of 65 to 70% with 1+ mitral regurgitation.    Patient had a Holter monitor in April 2023 that shows underlying rhythm was sinus rhythm with a minimum heart rate of 42 bpm, maximal heart rate of 195 no average heart rate of 75 bpm. No evidence of atrial fibrillation.    Patient was admitted in June 2023 at [facility] for palpitations stated with atrial fibrillation. During this episode, the dose of dofetilide was increased to 500 mcg twice a day. She self converted to sinus rhythm.    EKG performed today shows sinus rhythm right bundle branch block at a rate of 63 bpm QRS duration 122 ms QT corrected 452 ms.  Rhythm strip shows the same pattern.    Recent admission for epistaxis.  She presented to emergency department for which she was evaluated by ENT.  She was reevaluated few days later and apparently bleeding has been controlled.    EKG performed today shows sinus rhythm right bundle branch  block at a rate of 63 bpm QRS duration 122 ms QT corrected 452 ms.  Rhythm strip shows the same pattern.        Past Medical History  Past Medical History:   Diagnosis Date    Breast cancer (CMS/[facility])     right breast cancer 5/22    Cervicalgia 09/21/2021    Neck pain, chronic    Corns and callosities 03/25/2022    Corn of foot    Encounter for screening for malignant neoplasm of colon 08/17/2020    Colon cancer screening    Ingrowing nail 11/15/2022    Ingrown toenail    Low back pain, unspecified 08/13/2020    Acute right-sided low back pain without sciatica    Meralgia paresthetica, left lower limb 08/13/2020    Meralgia paresthetica of left side    Obesity, unspecified 08/31/2022    Class 1 obesity with body mass index (BMI) of 32.0 to 32.9 in adult    Otalgia, left ear 04/20/2020    Left ear pain    Other chronic allergic conjunctivitis 10/06/2021    Other chronic allergic conjunctivitis of both eyes    Other conditions influencing health status     Back sprain    Other conditions influencing health status 02/10/2022    History of cough    Other instability, right ankle 04/21/2021    Other instability, right ankle    Other obesity due to excess calories 06/14/2022    Class 1 obesity due to excess calories with serious comorbidity and body mass index (BMI) of 32.0 to 32.9 in adult    Other specified soft tissue disorders 05/27/2021    Swelling of left foot    Otitis media, unspecified, left ear 04/20/2020    Infection of left ear    Pain in left leg 03/13/2021    Pain of left lower extremity    Pain in unspecified ankle and joints of unspecified foot 04/21/2021    Ankle pain    Pain in unspecified hip 09/21/2022    Hip pain, acute    Personal history of diseases of the skin and subcutaneous tissue 12/07/2021    History of dermatitis    Personal history of other diseases of the circulatory system 09/14/2022    History of sinoatrial node dysfunction    Personal history of other diseases of the circulatory system  12/01/2021    History of atrial fibrillation    Personal history of other diseases of the digestive system 03/25/2022    History of constipation    Personal history of other diseases of the female genital tract     History of abnormal cervical Papanicolaou smear    Personal history of other diseases of the respiratory system 02/10/2022    History of sinusitis    Personal history of other diseases of the respiratory system 08/05/2022    History of paranasal sinus congestion    Personal history of other drug therapy 01/18/2022    History of pneumococcal vaccination    Personal history of other endocrine, nutritional and metabolic disease 12/01/2021    History of obesity    Personal history of other medical treatment 10/08/2019    History of screening mammography    Personal history of other specified conditions 11/17/2021    History of edema    Personal history of other specified conditions 09/14/2022    History of palpitations    Personal history of other specified conditions 07/15/2021    History of fatigue    Personal history of other specified conditions 03/25/2022    History of abdominal pain    Personal history of other specified conditions 08/31/2022    History of postnasal drip    Plantar fascial fibromatosis     Plantar fasciitis    Posterior tibial tendinitis, left leg 03/13/2021    Tibialis tendinitis of left lower extremity    Spontaneous ecchymoses 11/17/2021    Petechiae    Strain of other muscle(s) and tendon(s) at lower leg level, right leg, initial encounter 06/18/2020    Strain of right calf muscle       Social History  Social History     Tobacco Use    Smoking status: Never    Smokeless tobacco: Never   Vaping Use    Vaping Use: Never used   Substance Use Topics    Alcohol use: Yes    Drug use: Never       Family History     Family History   Problem Relation Name Age of Onset    Hypertension Mother      Stroke Father      Hypertension Father      Breast cancer Mother's Sister           Allergies:  Allergies   Allergen Reactions    Lisinopril Cough        Outpatient Medications:  Current Outpatient Medications   Medication Instructions    calcium 500 mg calcium (1,250 mg) tablet 1 tablet, oral, Daily    cholecalciferol (Vitamin D-3) 50 mcg (2,000 unit) capsule oral    docusate sodium (COLACE) 100 mg, oral, Daily    dofetilide (Tikosyn) 500 mcg capsule 1 capsule, oral, 2 times daily    Eliquis 5 mg tablet 1 tablet, oral, 2 times daily    hydrALAZINE (APRESOLINE) 50 mg, oral, 3 times daily    ketoconazole (NIZOral) 2 % cream Topical, 2 times daily    magnesium oxide (Mag-Ox) 400 mg (241.3 mg magnesium) tablet 1 tablet, oral, Daily    melatonin 2 mg, oral, Nightly    metaxalone (SKELAXIN) 800 mg, oral, Daily PRN    metoprolol succinate XL (Toprol-XL) 25 mg 24 hr tablet 1 tablet, oral, Nightly    miconazole (Lotrimin AF) 2 % powder Topical, 2 times daily    mometasone (Elocon) 0.1 % cream Apply to under breast and scaly areas of scalp twice a day as needed    multivitamin with minerals (multivitamin-iron-folic acid) tablet 1 tablet, oral, Daily    ondansetron ODT (ZOFRAN-ODT) 4 mg, oral, 3 times daily PRN    Ozempic 1 mg, subcutaneous, Weekly    rosuvastatin (CRESTOR) 5 mg, oral, Daily    topiramate (TOPAMAX) 50 mg, oral, 2 times daily    Ubrelvy 100 mg, oral, As needed    valsartan (Diovan) 320 mg tablet TAKE 1 TABLET BY MOUTH EVERY DAY    white petrolatum-mineral oil (Refresh P.M.) 57.3-42.5 % ophthalmic ointment Both Eyes, Once          REVIEW OF SYSTEMS  Review of Systems   HENT:  Positive for nosebleeds.    Cardiovascular:  Positive for palpitations. Negative for chest pain and dyspnea on exertion.   All other systems reviewed and are negative.        VITALS  Vitals:    12/29/23 0944   BP: 122/78   Pulse: 63       PHYSICAL EXAM  Constitutional:       General: Awake.      Appearance: Normal and healthy appearance. Well-developed and not in distress.   HENT:      Nose:      Right Nostril: No  epistaxis.      Left Nostril: No epistaxis.   Neck:      Vascular: No JVR. JVD normal.   Pulmonary:      Effort: Pulmonary effort is normal.      Breath sounds: Normal breath sounds. No wheezing. No rhonchi. No rales.   Chest:      Chest wall: Not tender to palpatation.   Cardiovascular:      PMI at left midclavicular line. Normal rate. Regular rhythm. Normal S1. Normal S2.       Murmurs: There is no murmur.      No gallop.  No click. No rub.   Pulses:     Intact distal pulses.   Edema:     Peripheral edema absent.   Abdominal:      Tenderness: There is no abdominal tenderness.   Musculoskeletal: Normal range of motion.         General: No tenderness. Skin:     General: Skin is warm and dry.   Neurological:      General: No focal deficit present.      Mental Status: Alert and oriented to person, place and time.           ASSESSMENT AND PLAN    Clinical impressions:  1. Paroxysmal atrial fibrillation with increased burden on flecainide, controlled on dofetilide, metoprolol, magnesium oxide, and anticoagulated with Eliquis.  2. Hypertension, controlled .  3. Partial right mastectomy and radiation therapy with surgical margins being clear and currently on surveillance screening.  4. Normal coronaries per left heart catheterization dated September 27, 2019.  5. Normal left ventricular function per RAIZA dated September 27, 2019.  6. Sinus node dysfunction.  7. Right bundle branch block per twelve-lead EKG.  8. Class I obesity with a BMI 33.66.    Plan-recommendations    I had a lengthy discussion with patient and family member regarding plan to follow for management of atrial fibrillation and use of anticoagulation therapy.  Patient was holding of Eliquis for a week.  She was instructed to resume Eliquis.  She still having palpitations and I am not sure if she is having brief episodes of atrial fibrillation or just sinus rhythm-sinus tachycardia.  Patient also is under a lot of stress.  Patient agrees to resume Eliquis.   If she has more bleeding she needs to be seen very closely by ENT service.    Follow my office in next 3 to 4 weeks.  If she has recurrent bleedings not able to control by ENT service, then a Watchman device will be recommended.    Continue with dofetilide therapy.    Prefer to avoid PVI now that patient has significant nosebleed.    Risk factor modification and lifestyle modification discussed with patient. Diet , exercise and hydration discussed with patient.    I have personally review with patient during this office visit, laboratory data, echocardiogram results, stress test results, Holter-event monitor results prior and after the last electrophysiology visit. All questions has been answered.    Please excuse any errors in grammar or translation related to this dictation.  Voice recognition software was utilized to prepare this document.

## 2023-12-29 NOTE — PATIENT INSTRUCTIONS
Continue same medications/treatment.  Patient educated on proper medication use.  Patient educated on risk factor modification.  Please bring any lab results from other providers/physicians to your next appointment.    Please bring all medicines, vitamins, and herbal supplements with you when you come to the office.    Prescriptions will not be filled unless you are compliant with your follow up appointments or have a follow up appointment scheduled as per instruction of your physician. Refills should be requested at the time of your visit.    Follow up with Fabi in 4-6 weeks     ADE MARINELLI RN, AM SCRIBING FOR, AND IN THE PRESENCE OF DR. SYDNEY BARILLAS MD

## 2024-01-03 ENCOUNTER — TELEPHONE (OUTPATIENT)
Dept: CARDIOLOGY | Facility: CLINIC | Age: 59
End: 2024-01-03
Payer: COMMERCIAL

## 2024-01-03 ENCOUNTER — SPECIALTY PHARMACY (OUTPATIENT)
Dept: PHARMACY | Facility: CLINIC | Age: 59
End: 2024-01-03

## 2024-01-03 PROCEDURE — RXMED WILLOW AMBULATORY MEDICATION CHARGE

## 2024-01-03 NOTE — TELEPHONE ENCOUNTER
Pt called office stating that she faxed over paperwork to [phone] for intermittent FMLA however the end date on the cover she is not correct.  Per pt she will be refaxing everything over with the correct dates.    Elana ROJO RN aware.

## 2024-01-04 ENCOUNTER — PHARMACY VISIT (OUTPATIENT)
Dept: PHARMACY | Facility: CLINIC | Age: 59
End: 2024-01-04
Payer: COMMERCIAL

## 2024-01-04 NOTE — TELEPHONE ENCOUNTER
Pt left message 1/3/2024 at 328 pm stating that she faxed over her paperwork to [phone].  Pt states that the correct dates are on the paper work for 1/2/2024-7/1/2024.  Pt states that she works 3-12 hours shifts.  Pt states if any questions please contact her at [phone].    Routed to Elana ROJO RN

## 2024-01-05 ENCOUNTER — OFFICE VISIT (OUTPATIENT)
Dept: PRIMARY CARE | Facility: CLINIC | Age: 59
End: 2024-01-05
Payer: COMMERCIAL

## 2024-01-05 VITALS
WEIGHT: 184.32 LBS | HEART RATE: 63 BPM | RESPIRATION RATE: 16 BRPM | SYSTOLIC BLOOD PRESSURE: 127 MMHG | TEMPERATURE: 97.9 F | DIASTOLIC BLOOD PRESSURE: 80 MMHG | OXYGEN SATURATION: 96 % | HEIGHT: 65 IN | BODY MASS INDEX: 30.71 KG/M2

## 2024-01-05 DIAGNOSIS — R09.81 NASAL CONGESTION WITH RHINORRHEA: ICD-10-CM

## 2024-01-05 DIAGNOSIS — J01.00 ACUTE NON-RECURRENT MAXILLARY SINUSITIS: Primary | ICD-10-CM

## 2024-01-05 DIAGNOSIS — J00 NASOPHARYNGITIS: ICD-10-CM

## 2024-01-05 DIAGNOSIS — J34.89 NASAL CONGESTION WITH RHINORRHEA: ICD-10-CM

## 2024-01-05 PROCEDURE — 99213 OFFICE O/P EST LOW 20 MIN: CPT | Performed by: NURSE PRACTITIONER

## 2024-01-05 RX ORDER — AMOXICILLIN 875 MG/1
875 TABLET, FILM COATED ORAL 2 TIMES DAILY
Qty: 20 TABLET | Refills: 0 | Status: SHIPPED | OUTPATIENT
Start: 2024-01-05 | End: 2024-01-15

## 2024-01-05 ASSESSMENT — ENCOUNTER SYMPTOMS
DEPRESSION: 0
LOSS OF SENSATION IN FEET: 0
OCCASIONAL FEELINGS OF UNSTEADINESS: 0

## 2024-01-05 ASSESSMENT — PATIENT HEALTH QUESTIONNAIRE - PHQ9
1. LITTLE INTEREST OR PLEASURE IN DOING THINGS: NOT AT ALL
1. LITTLE INTEREST OR PLEASURE IN DOING THINGS: NOT AT ALL
SUM OF ALL RESPONSES TO PHQ9 QUESTIONS 1 AND 2: 0
2. FEELING DOWN, DEPRESSED OR HOPELESS: NOT AT ALL
2. FEELING DOWN, DEPRESSED OR HOPELESS: NOT AT ALL
SUM OF ALL RESPONSES TO PHQ9 QUESTIONS 1 AND 2: 0

## 2024-01-05 NOTE — PROGRESS NOTES
Subjective   Patient ID: Aimee Ponce is a 58 y.o. female who is with a chief complaint of symptoms of respiratory tract infection.     HPI  Patient is a 58 y.o. female who CONSULTED AT [facility] today. Patient is with complaints of nasal congestion, nasal discharge, headache, maxillary sinus pain, throat irritation, mild post nasal drip, and mild cough. She denies having any fatigue, muscle ache, loss of sense of taste, loss of sense of smell, diarrhea, chills nor fever. 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 she denies shortness of breath, chest pain, palpitations, nor edema. she stated that she  tried OTC medications which afforded only slight relief of symptoms. she denies nausea, vomiting, abdominal pain, nor any other symptoms.    Patient states she had her COVID vaccine.  Patient states she had the flu shot for this season.  Patient states she underwent testing for COVID about 2 days ago, and the result was NEGATIVE.    Review of Systems  General: no weight loss, generally healthy, no fatigue  Head: (+) headaches, (+) maxillary sinus pain, no vertigo, no injury  Eyes: no diplopia, no tearing, no pain,   Ears: no change in hearing, no tinnitus, no bleeding, no vertigo  Mouth:  no dental difficulties, no gingival bleeding, (+) throat irritation, no loss of sense of taste, (+) mild post nasal drip,   Nose: (+) congestion, (+) discharge, no bleeding, no obstruction, no loss of sense of smell  Neck: no stiffness, no pain, no tenderness, no masses, no bruit  Pulmonary: no dyspnea, no wheezing, no hemoptysis, (+) mild cough  Cardiovascular: no chest pain, no palpitations, no syncope, no orthopnea  Gastrointestinal: no change in appetite, no dysphagia, no abdominal pains, no diarrhea, no emesis, no melena  Genito Urinary: no dysuria, no urinary urgency, no  nocturia, no incontinence, no change in nature of urine  Musculoskeletal: no muscle ache, no joint pain, no limitation of range of motion, no paresthesia, no numbness  Constitutional: no fever, no chills, no night sweats    Objective   Physical Exam  General: ambulatory, in no acute distress  Head: normocephalic, no lesions, (+) maxillary sinus tenderness  Eyes: pink palpebral conjunctiva, anicteric sclerae, PERRLA, EOM's full  Ears: clear external auditory canals, no ear discharge, no bleeding from the ears, tympanic membrane intact  Nose: (+) congested nasal mucosa, (+) yellow mucoid nasal discharge, no bleeding, no obstruction  Throat: (+) erythema, and (+) exudate on posterior pharyngeal wall, no lesion  Neck: supple, no masses, no bruits, no CLADP  Chest: symmetrical chest expansion, no lagging, no retractions, clear breath sounds, no rales, no wheezes    Assessment/Plan   Problem List Items Addressed This Visit    None  Visit Diagnoses         Codes    Acute non-recurrent maxillary sinusitis    -  Primary J01.00    Relevant Medications    amoxicillin (Amoxil) 875 mg tablet    Nasal congestion with rhinorrhea     R09.81, J34.89    Relevant Medications    amoxicillin (Amoxil) 875 mg tablet    Nasopharyngitis     J00    Relevant Medications    amoxicillin (Amoxil) 875 mg tablet        DISCHARGE SUMMARY:   Patient was seen and examined. Diagnosis, treatment, treatment options, and possible complications of today's illness discussed and explained to patient. Patient to take medication/s associated with this visit. Patient may take OTC decongestant of choice as needed. Patient may also take OTC analgesic/antipyretic if needed for pain/fever. Advised to increase oral fluid intake. Advised steam inhalation if needed to relieve congestion. Advised warm saline gargle if needed to relieve throat discomfort. Advised Listerine antiseptic mouthwash gargle TID. Patient may use Cepacol oral spray as needed to relieve throat  discomfort. Patient was advised to discard the old toothbrush and use a new toothbrush beginning on the third of antibiotics. Advised to come back if with worsening or persistent symptoms. Patient verbalized understanding of plan of care.    Patient to come back in 7 - 10 days if needed for worsening symptoms.           KEEGAN Owen-CNP 01/05/24 12:18 PM

## 2024-01-05 NOTE — PROGRESS NOTES
"Subjective   Patient ID: Aimee Ponce is a 58 y.o. female who presents for URI.    HPI     Symptoms: runny nose with brown mucus from right nostril., sinus pressure and pain, headaches, puffy eyes when waking, congestion, pt nose was cauterized in the past 10 days 12/26/2034  Length of symptoms: Sx started 1 week ago 12/29/2023 congestion.  OTC: Claritin with mild help.  Related information:    Review of Systems    Objective   /80 Comment: auto  Pulse 63   Temp 36.6 °C (97.9 °F) (Temporal)   Resp 16   Ht 1.651 m (5' 5\")   Wt 83.6 kg (184 lb 5.1 oz)   SpO2 96%   BMI 30.67 kg/m²     Physical Exam    Assessment/Plan          "

## 2024-01-08 ENCOUNTER — OFFICE VISIT (OUTPATIENT)
Dept: PRIMARY CARE | Facility: CLINIC | Age: 59
End: 2024-01-08
Payer: COMMERCIAL

## 2024-01-08 VITALS
TEMPERATURE: 97.3 F | RESPIRATION RATE: 18 BRPM | OXYGEN SATURATION: 97 % | HEART RATE: 62 BPM | DIASTOLIC BLOOD PRESSURE: 84 MMHG | HEIGHT: 65 IN | WEIGHT: 187 LBS | BODY MASS INDEX: 31.16 KG/M2 | SYSTOLIC BLOOD PRESSURE: 134 MMHG

## 2024-01-08 DIAGNOSIS — I48.11 LONGSTANDING PERSISTENT ATRIAL FIBRILLATION (MULTI): ICD-10-CM

## 2024-01-08 DIAGNOSIS — J01.90 ACUTE NON-RECURRENT SINUSITIS, UNSPECIFIED LOCATION: Primary | ICD-10-CM

## 2024-01-08 PROCEDURE — 3008F BODY MASS INDEX DOCD: CPT | Performed by: FAMILY MEDICINE

## 2024-01-08 PROCEDURE — 99213 OFFICE O/P EST LOW 20 MIN: CPT | Performed by: FAMILY MEDICINE

## 2024-01-08 PROCEDURE — 3079F DIAST BP 80-89 MM HG: CPT | Performed by: FAMILY MEDICINE

## 2024-01-08 PROCEDURE — 1036F TOBACCO NON-USER: CPT | Performed by: FAMILY MEDICINE

## 2024-01-08 PROCEDURE — 3075F SYST BP GE 130 - 139MM HG: CPT | Performed by: FAMILY MEDICINE

## 2024-01-08 RX ORDER — OXYMETAZOLINE HYDROCHLORIDE 0.05 G/100ML
1 SPRAY, METERED NASAL EVERY 12 HOURS PRN
Qty: 30 ML | Refills: 0 | Status: SHIPPED | OUTPATIENT
Start: 2024-01-08

## 2024-01-08 ASSESSMENT — ENCOUNTER SYMPTOMS
SINUS PRESSURE: 1
SORE THROAT: 0
HEADACHES: 1
COUGH: 0
SWOLLEN GLANDS: 0
HOARSE VOICE: 0
DIAPHORESIS: 0
SHORTNESS OF BREATH: 0
CHILLS: 0
NECK PAIN: 0

## 2024-01-08 NOTE — PROGRESS NOTES
"Subjective   Patient ID: Aimee Ponce is a 58 y.o. female who presents for Follow-up (Nasal Congestion/Urgent Care 1/5/23 - she is currently on Amoxil/ENT put her on Prednisone 1/5/23).    Sinusitis  This is a new problem. The current episode started in the past 7 days. The problem has been gradually improving since onset. There has been no fever. The pain is mild. Associated symptoms include congestion, headaches and sinus pressure. Pertinent negatives include no chills, coughing, diaphoresis, ear pain, hoarse voice, neck pain, shortness of breath, sneezing, sore throat or swollen glands. Past treatments include antibiotics. The treatment provided no relief.        Review of Systems   Constitutional:  Negative for chills and diaphoresis.   HENT:  Positive for congestion and sinus pressure. Negative for ear pain, hoarse voice, sneezing and sore throat.    Respiratory:  Negative for cough and shortness of breath.    Musculoskeletal:  Negative for neck pain.   Neurological:  Positive for headaches.       Objective   /84 (BP Location: Right arm, Patient Position: Sitting, BP Cuff Size: Large adult)   Pulse 62   Temp 36.3 °C (97.3 °F) (Temporal)   Resp 18   Ht 1.651 m (5' 5\")   Wt 84.8 kg (187 lb)   SpO2 97%   BMI 31.12 kg/m²     Physical Exam  Constitutional:       Appearance: Normal appearance.   HENT:      Head: Normocephalic and atraumatic.      Right Ear: External ear normal.      Left Ear: External ear normal.      Nose: Congestion present. No rhinorrhea.      Right Nostril: No epistaxis.      Left Nostril: No epistaxis.      Right Sinus: No maxillary sinus tenderness or frontal sinus tenderness.      Left Sinus: No maxillary sinus tenderness or frontal sinus tenderness.      Mouth/Throat:      Pharynx: No pharyngeal swelling, oropharyngeal exudate, posterior oropharyngeal erythema or uvula swelling.      Comments: Clear/colorless postnasal drainage  Cardiovascular:      Rate and Rhythm: Normal rate " and regular rhythm.   Pulmonary:      Effort: Pulmonary effort is normal.      Breath sounds: Normal breath sounds.   Abdominal:      Palpations: Abdomen is soft.      Tenderness: There is no abdominal tenderness.   Skin:     General: Skin is warm and dry.   Neurological:      General: No focal deficit present.      Mental Status: She is alert and oriented to person, place, and time.         Assessment/Plan   Diagnoses and all orders for this visit:  Acute non-recurrent sinusitis, unspecified location  -     oxymetazoline (Afrin, oxymetazoline,) 0.05 % nasal spray; Administer 1 spray into each nostril every 12 hours if needed for congestion for up to 3 doses. Do not use more than 3 doses in any 7-day period.  Longstanding persistent atrial fibrillation (CMS/HCC)  Finish amoxicillin as previously ordered.  Trial of Afrin no more than 3 doses in any 7-day timeframe.  Recommend follow-up with ENT as previously scheduled

## 2024-01-09 ASSESSMENT — ENCOUNTER SYMPTOMS
CARDIOVASCULAR NEGATIVE: 1
EYES NEGATIVE: 1
MUSCULOSKELETAL NEGATIVE: 1
GASTROINTESTINAL NEGATIVE: 1
RESPIRATORY NEGATIVE: 1
HEMATOLOGIC/LYMPHATIC NEGATIVE: 1
PSYCHIATRIC NEGATIVE: 1
NEUROLOGICAL NEGATIVE: 1
CONSTITUTIONAL NEGATIVE: 1
ALLERGIC/IMMUNOLOGIC NEGATIVE: 1
ENDOCRINE NEGATIVE: 1

## 2024-01-09 NOTE — PROGRESS NOTES
Subjective   Patient ID: Aimee Ponce is a 58 y.o. female who presents for No chief complaint on file..  HPI    No new complaints     Interval imagin2022: Bilateral diagnostic mammogram normal category 2 findings.   23: Bilateral diagnostic mammogram normal category 2 findings.     2022: partial mastectomy right breast, final pathology DCIS with clear margins. ER/PRnegative. No complaints.  [id number]     Completed adjuvant radiation 2022     Review of Systems   Constitutional: Negative.    HENT: Negative.     Eyes: Negative.    Respiratory: Negative.     Cardiovascular: Negative.    Gastrointestinal: Negative.    Endocrine: Negative.    Genitourinary: Negative.    Musculoskeletal: Negative.    Skin: Negative.    Allergic/Immunologic: Negative.    Neurological: Negative.    Hematological: Negative.    Psychiatric/Behavioral: Negative.     Past Medical History  Problems    · History of Acute right-sided low back pain without sciatica (724.2) (M54.50)   · Resolved Date: 23 Oct 2020   · History of Ankle pain (719.47) (M25.579)   · Resolved Date: 21 Sep 2022   · History of Back sprain (847.9)   · History of Class 1 obesity due to excess calories with serious comorbidity and body  mass index (BMI) of 32.0 to 32.9 in adult (278.00,V85.32) (E66.09,Z68.32)   · Resolved Date: 14 Sep 2022   · History of Class 1 obesity with body mass index (BMI) of 32.0 to 32.9 in adult  (278.00,V85.32) (E66.9,Z68.32)   · Resolved Date: 14 Sep 2022   · History of Colon cancer screening (V76.51) (Z12.11)   · Resolved Date: 21 Sep 2022   · History of Corn of foot (700) (L84)   · Resolved Date: 2022   · History of Hip pain, acute (719.45) (M25.559)   · Resolved Date: 14 Dec 2022   · History of abdominal pain (V13.89) (Z87.898)   · Resolved Date: 21 Sep 2022   · History of abnormal cervical Papanicolaou smear (V13.29) (Z87.42)   · History of atrial fibrillation (V12.59) (Z86.79)   · Resolved Date: 2022   ·  History of constipation (V12.79) (Z87.19)   · Resolved Date: 14 Jun 2022   · History of cough   · Resolved Date: 14 Jun 2022   · History of dermatitis (V13.3) (Z87.2)   · Resolved Date: 14 Jun 2022   · History of edema (V13.89) (Z87.898)   · Resolved Date: 14 Jun 2022   · History of fatigue (V13.89) (Z87.898)   · Resolved Date: 14 Jun 2022   · History of obesity (V12.29) (Z86.39)   · Resolved Date: 14 Jun 2022   · History of palpitations (V12.59) (Z87.898)   · Resolved Date: 14 Dec 2022   · History of paranasal sinus congestion (V12.69) (Z87.09)   · Resolved Date: 21 Sep 2022   · History of pneumococcal vaccination (V49.89) (Z92.29)   · Resolved Date: 21 Sep 2022   · History of postnasal drip (V13.89) (Z87.898)   · Resolved Date: 21 Sep 2022   · History of screening mammography (V15.89) (Z92.89)   · Resolved Date: 21 Sep 2022   · History of sinoatrial node dysfunction (V12.59) (Z86.79)   · Resolved Date: 14 Dec 2022   · History of sinusitis (V12.69) (Z87.09)   · Resolved Date: 14 Jun 2022   · History of Infection of left ear (382.9) (H66.92)   · Resolved Date: 23 Oct 2020   · History of Left ear pain (388.70) (H92.02)   · Resolved Date: 23 Oct 2020   · History of Meralgia paresthetica of left side (355.1) (G57.12)   · Resolved Date: 23 Oct 2020   · History of Neck pain, chronic (723.1,338.29) (M54.2,G89.29)   · Resolved Date: 21 Sep 2022   · History of Other chronic allergic conjunctivitis of both eyes (372.14) (H10.45)   · Resolved Date: 14 Jun 2022   · History of Other instability, right ankle (718.87) (M25.371)   · Resolved Date: 14 Jun 2022   · History of Pain of left lower extremity (729.5) (M79.605)   · Resolved Date: 14 Jun 2022   · History of Petechiae (782.7) (R23.3)   · Resolved Date: 14 Jun 2022   · History of Plantar fasciitis (728.71) (M72.2)   · History of Strain of right calf muscle (844.8) (S86.811A)   · Resolved Date: 23 Oct 2020   · History of Swelling of left foot (729.81) (M79.89)   · Resolved  Date: 14 Jun 2022   · History of Tibialis tendinitis of left lower extremity (726.72) (E45.823)   · Resolved Date: 14 Jun 2022     Surgical History  Problems    · History of Appendectomy   · History of Bunionectomy   · History of Carpal tunnel surgery   · 12/2022   · History of Cervical loop electrosurgical excision   · History of Lumpectomy   · History of Simple Bunion Exostectomy (Silver Procedure)   · History of Tonsillectomy with adenoidectomy    Family History   Problem Relation Name Age of Onset    Hypertension Mother      Stroke Father      Hypertension Father      Breast cancer Mother's Sister        Social History     Socioeconomic History    Marital status:      Spouse name: Not on file    Number of children: Not on file    Years of education: Not on file    Highest education level: Not on file   Occupational History    Not on file   Tobacco Use    Smoking status: Never    Smokeless tobacco: Never   Vaping Use    Vaping Use: Never used   Substance and Sexual Activity    Alcohol use: Yes    Drug use: Never    Sexual activity: Not on file   Other Topics Concern    Not on file   Social History Narrative    Not on file     Social Determinants of Health     Financial Resource Strain: Not on file   Food Insecurity: Not on file   Transportation Needs: Not on file   Physical Activity: Not on file   Stress: Not on file   Social Connections: Not on file   Intimate Partner Violence: Not on file   Housing Stability: Not on file          Current Outpatient Medications:     amoxicillin (Amoxil) 875 mg tablet, Take 1 tablet (875 mg) by mouth 2 times a day for 10 days., Disp: 20 tablet, Rfl: 0    calcium 500 mg calcium (1,250 mg) tablet, Take 1 tablet (1,250 mg) by mouth once daily., Disp: , Rfl:     cholecalciferol (Vitamin D-3) 50 mcg (2,000 unit) capsule, Take by mouth., Disp: , Rfl:     docusate sodium (Colace) 100 mg capsule, Take 1 capsule (100 mg) by mouth once daily., Disp: , Rfl:     dofetilide (Tikosyn)  500 mcg capsule, Take 1 capsule (500 mcg) by mouth 2 times a day., Disp: , Rfl:     Eliquis 5 mg tablet, Take 1 tablet (5 mg) by mouth 2 times a day., Disp: , Rfl:     hydrALAZINE (Apresoline) 50 mg tablet, TAKE 1 TABLET BY MOUTH THREE TIMES A DAY, Disp: 270 tablet, Rfl: 0    ketoconazole (NIZOral) 2 % cream, Apply topically 2 times a day., Disp: 30 g, Rfl: 11    magnesium oxide (Mag-Ox) 400 mg (241.3 mg magnesium) tablet, Take 1 tablet (400 mg) by mouth once daily., Disp: , Rfl:     melatonin 1 mg tablet,chewable, Chew 2 mg once daily at bedtime., Disp: , Rfl:     metaxalone (Skelaxin) 800 mg tablet, Take 1 tablet (800 mg) by mouth once daily as needed., Disp: , Rfl:     metoprolol succinate XL (Toprol-XL) 25 mg 24 hr tablet, Take 1 tablet (25 mg) by mouth once daily at bedtime., Disp: , Rfl:     miconazole (Lotrimin AF) 2 % powder, Apply topically 2 times a day., Disp: 85 g, Rfl: 11    mometasone (Elocon) 0.1 % cream, Apply to under breast and scaly areas of scalp twice a day as needed, Disp: , Rfl:     multivitamin with minerals (multivitamin-iron-folic acid) tablet, Take 1 tablet by mouth once daily., Disp: , Rfl:     ondansetron ODT (Zofran-ODT) 4 mg disintegrating tablet, Take 1 tablet (4 mg) by mouth 3 times a day as needed for nausea., Disp: , Rfl:     oxymetazoline (Afrin, oxymetazoline,) 0.05 % nasal spray, Administer 1 spray into each nostril every 12 hours if needed for congestion for up to 3 doses. Do not use more than 3 doses in any 7-day period., Disp: 30 mL, Rfl: 0    rosuvastatin (Crestor) 5 mg tablet, TAKE 1 TABLET BY MOUTH EVERY DAY, Disp: 90 tablet, Rfl: 0    semaglutide (Ozempic) 1 mg/dose (4 mg/3 mL) pen injector, Inject 1 mg under the skin 1 (one) time per week., Disp: , Rfl:     topiramate (Topamax) 50 mg tablet, TAKE 1 TABLET (50 MG) BY MOUTH IN THE MORNING AND BEFORE BEDTIME, Disp: 90 tablet, Rfl: 0    Ubrelvy 100 mg tablet tablet, Take 1 tablet (100 mg) by mouth if needed (migraine).,  Disp: 16 tablet, Rfl: 5    valsartan (Diovan) 320 mg tablet, TAKE 1 TABLET BY MOUTH EVERY DAY, Disp: 90 tablet, Rfl: 1    white petrolatum-mineral oil (Refresh P.M.) 57.3-42.5 % ophthalmic ointment, Apply to both eyes 1 time., Disp: , Rfl:      Objective   There were no vitals filed for this visit.   Physical Exam  Constitutional - General appearance: In no acute distress, well appearing and well nourished.   Neck - Exam: Appearance of the neck was normal. No neck masses and no adenopathy observed.   Pulmonary - Respiratory effort: Normal respiration.   Chest - Breasts: Normal, no dimpling or skin changes appreciated. Right breast incision well-healed. Mild skin pigmentation c/w radiation change.  Assessment/Plan   Problem List Items Addressed This Visit    None  Visit Diagnoses         Codes    Malignant neoplasm of right breast in female, estrogen receptor negative, unspecified site of breast (CMS/[facility])    -  Primary C50.911, Z17.1          Overall doing well from my standpoint.  6-month follow-up follow-up, no imaging.  Annual screening mammogram    Chrissy Benavides MD

## 2024-01-10 ENCOUNTER — OFFICE VISIT (OUTPATIENT)
Dept: SURGERY | Facility: HOSPITAL | Age: 59
End: 2024-01-10
Payer: COMMERCIAL

## 2024-01-10 ENCOUNTER — TELEPHONE (OUTPATIENT)
Dept: NEUROLOGY | Facility: CLINIC | Age: 59
End: 2024-01-10

## 2024-01-10 VITALS
HEART RATE: 60 BPM | BODY MASS INDEX: 31.12 KG/M2 | SYSTOLIC BLOOD PRESSURE: 135 MMHG | DIASTOLIC BLOOD PRESSURE: 95 MMHG | WEIGHT: 187 LBS

## 2024-01-10 DIAGNOSIS — C50.911 MALIGNANT NEOPLASM OF RIGHT BREAST IN FEMALE, ESTROGEN RECEPTOR NEGATIVE, UNSPECIFIED SITE OF BREAST (MULTI): Primary | ICD-10-CM

## 2024-01-10 DIAGNOSIS — Z17.1 MALIGNANT NEOPLASM OF RIGHT BREAST IN FEMALE, ESTROGEN RECEPTOR NEGATIVE, UNSPECIFIED SITE OF BREAST (MULTI): Primary | ICD-10-CM

## 2024-01-10 PROCEDURE — 99213 OFFICE O/P EST LOW 20 MIN: CPT | Performed by: SURGERY

## 2024-01-10 PROCEDURE — 3008F BODY MASS INDEX DOCD: CPT | Performed by: SURGERY

## 2024-01-10 PROCEDURE — 1036F TOBACCO NON-USER: CPT | Performed by: SURGERY

## 2024-01-10 PROCEDURE — 3075F SYST BP GE 130 - 139MM HG: CPT | Performed by: SURGERY

## 2024-01-10 PROCEDURE — 3080F DIAST BP >= 90 MM HG: CPT | Performed by: SURGERY

## 2024-01-10 NOTE — TELEPHONE ENCOUNTER
Patient called and requested follow up information about her [place] paperwork. We confirmed that our office filled out the paper work and submitted it to [employer]. It was scanned into her chart on 12/26/2023. Explained we have done everything possible on our end and now she is waiting for [employer] to process the paperwork. Patient has called multiple times asking this very information.

## 2024-01-11 NOTE — TELEPHONE ENCOUNTER
Pt left message wanting to know if office received her [place] paperwork that she faxed last week.  Pt asking for return call to her at [phone].    Routed to Elana ROJO RN

## 2024-01-15 NOTE — TELEPHONE ENCOUNTER
Pt called office to check status on FMLA.  Advised that paperwork is filled out and awaiting for Dr. Ferrer to sign.      Pt is asking for paperwork to be faxed to number at bottom of the form and she would like a hard copy.  Pt states that office can email her when paperwork is ready for her to .  Pt's email is [email or web address].    Routed to Elana ROJO RN

## 2024-01-16 NOTE — TELEPHONE ENCOUNTER
[employer] paperwork faxed to [employer] at [phone]. Patient called and informed the copy is ready for he to .

## 2024-01-19 ENCOUNTER — TELEPHONE (OUTPATIENT)
Dept: PRIMARY CARE | Facility: CLINIC | Age: 59
End: 2024-01-19
Payer: COMMERCIAL

## 2024-01-22 DIAGNOSIS — G47.33 OBSTRUCTIVE SLEEP APNEA: Primary | ICD-10-CM

## 2024-01-22 DIAGNOSIS — R09.81 SINUS CONGESTION: ICD-10-CM

## 2024-01-29 ENCOUNTER — OFFICE VISIT (OUTPATIENT)
Dept: CARDIOLOGY | Facility: CLINIC | Age: 59
End: 2024-01-29
Payer: COMMERCIAL

## 2024-01-29 VITALS
SYSTOLIC BLOOD PRESSURE: 140 MMHG | HEART RATE: 56 BPM | HEIGHT: 65 IN | BODY MASS INDEX: 29.66 KG/M2 | WEIGHT: 178 LBS | DIASTOLIC BLOOD PRESSURE: 82 MMHG

## 2024-01-29 DIAGNOSIS — Z78.9 NEVER SMOKED CIGARETTES: ICD-10-CM

## 2024-01-29 DIAGNOSIS — I10 ESSENTIAL HYPERTENSION: ICD-10-CM

## 2024-01-29 DIAGNOSIS — R00.2 PALPITATIONS: ICD-10-CM

## 2024-01-29 DIAGNOSIS — I49.5 SINUS NODE DYSFUNCTION (MULTI): ICD-10-CM

## 2024-01-29 DIAGNOSIS — E78.5 HYPERLIPIDEMIA, UNSPECIFIED HYPERLIPIDEMIA TYPE: ICD-10-CM

## 2024-01-29 DIAGNOSIS — R04.0 EPISTAXIS: ICD-10-CM

## 2024-01-29 DIAGNOSIS — E78.2 MIXED HYPERLIPIDEMIA: ICD-10-CM

## 2024-01-29 DIAGNOSIS — I48.0 PAROXYSMAL ATRIAL FIBRILLATION WITH RVR (MULTI): Primary | ICD-10-CM

## 2024-01-29 DIAGNOSIS — G47.33 OBSTRUCTIVE SLEEP APNEA: ICD-10-CM

## 2024-01-29 PROCEDURE — 3008F BODY MASS INDEX DOCD: CPT | Performed by: NURSE PRACTITIONER

## 2024-01-29 PROCEDURE — 3077F SYST BP >= 140 MM HG: CPT | Performed by: NURSE PRACTITIONER

## 2024-01-29 PROCEDURE — 3079F DIAST BP 80-89 MM HG: CPT | Performed by: NURSE PRACTITIONER

## 2024-01-29 PROCEDURE — 99214 OFFICE O/P EST MOD 30 MIN: CPT | Performed by: NURSE PRACTITIONER

## 2024-01-29 PROCEDURE — 1036F TOBACCO NON-USER: CPT | Performed by: NURSE PRACTITIONER

## 2024-01-29 RX ORDER — LANOLIN ALCOHOL/MO/W.PET/CERES
1 CREAM (GRAM) TOPICAL DAILY
Qty: 90 TABLET | Refills: 3 | Status: SHIPPED | OUTPATIENT
Start: 2024-01-29 | End: 2025-01-28

## 2024-01-29 RX ORDER — DOFETILIDE 0.5 MG/1
500 CAPSULE ORAL 2 TIMES DAILY
Qty: 180 CAPSULE | Refills: 3 | Status: SHIPPED | OUTPATIENT
Start: 2024-01-29 | End: 2025-01-28

## 2024-01-29 RX ORDER — APIXABAN 5 MG/1
5 TABLET, FILM COATED ORAL 2 TIMES DAILY
Qty: 180 TABLET | Refills: 3 | Status: SHIPPED | OUTPATIENT
Start: 2024-01-29 | End: 2025-01-28

## 2024-01-29 RX ORDER — ROSUVASTATIN CALCIUM 5 MG/1
5 TABLET, COATED ORAL DAILY
Qty: 90 TABLET | Refills: 3 | Status: SHIPPED | OUTPATIENT
Start: 2024-01-29 | End: 2025-01-28

## 2024-01-29 RX ORDER — METOPROLOL SUCCINATE 25 MG/1
25 TABLET, EXTENDED RELEASE ORAL NIGHTLY
Qty: 90 TABLET | Refills: 3 | Status: SHIPPED | OUTPATIENT
Start: 2024-01-29 | End: 2025-01-28

## 2024-01-29 NOTE — PROGRESS NOTES
"CARDIOLOGY OFFICE VISIT      CHIEF COMPLAINT  Chief Complaint   Patient presents with    Atrial Fibrillation     6 week follow-up.   Chief complaint: \"My last nosebleed was about a week and a half ago.\"    HISTORY OF PRESENT ILLNESS  HPI  History: The patient is a 58-year-old  female who is followed for paroxysmal atrial fibrillation controlled on dofetilide, metoprolol, magnesium oxide, and anticoagulated with Eliquis.  She was seen in the office by Dr. Ferrer on December 29, 2023 for follow-up of atrial fibrillation with rapid ventricular response.  She had had an episode of epistaxis on December 21, 2023 which required nasal packing.  Patient states since that time she has had a total of 4 episodes of epistaxis.  Most recent episode was a week and a half ago when the patient attempted to resume use of her CPAP.  She reports that the settings have never been changed since she began using the apparatus and questions whether the humidity may need to be changed on the device.  Patient is scheduled for a telehealth visit with the physician from [facility].  She denies chest pain, palpitations, dizziness, lightheadedness, shortness of breath, abdominal distention, or lower extremity edema.  Past Medical History  Past Medical History:   Diagnosis Date    Breast cancer (CMS/[facility])     right breast cancer 5/22    Cervicalgia 09/21/2021    Neck pain, chronic    Corns and callosities 03/25/2022    Corn of foot    Encounter for screening for malignant neoplasm of colon 08/17/2020    Colon cancer screening    Ingrowing nail 11/15/2022    Ingrown toenail    Low back pain, unspecified 08/13/2020    Acute right-sided low back pain without sciatica    Meralgia paresthetica, left lower limb 08/13/2020    Meralgia paresthetica of left side    Obesity, unspecified 08/31/2022    Class 1 obesity with body mass index (BMI) of 32.0 to 32.9 in adult    Otalgia, left ear 04/20/2020    Left ear pain    Other chronic allergic conjunctivitis " 10/06/2021    Other chronic allergic conjunctivitis of both eyes    Other conditions influencing health status     Back sprain    Other conditions influencing health status 02/10/2022    History of cough    Other instability, right ankle 04/21/2021    Other instability, right ankle    Other obesity due to excess calories 06/14/2022    Class 1 obesity due to excess calories with serious comorbidity and body mass index (BMI) of 32.0 to 32.9 in adult    Other specified soft tissue disorders 05/27/2021    Swelling of left foot    Otitis media, unspecified, left ear 04/20/2020    Infection of left ear    Pain in left leg 03/13/2021    Pain of left lower extremity    Pain in unspecified ankle and joints of unspecified foot 04/21/2021    Ankle pain    Pain in unspecified hip 09/21/2022    Hip pain, acute    Personal history of diseases of the skin and subcutaneous tissue 12/07/2021    History of dermatitis    Personal history of other diseases of the circulatory system 09/14/2022    History of sinoatrial node dysfunction    Personal history of other diseases of the circulatory system 12/01/2021    History of atrial fibrillation    Personal history of other diseases of the digestive system 03/25/2022    History of constipation    Personal history of other diseases of the female genital tract     History of abnormal cervical Papanicolaou smear    Personal history of other diseases of the respiratory system 02/10/2022    History of sinusitis    Personal history of other diseases of the respiratory system 08/05/2022    History of paranasal sinus congestion    Personal history of other drug therapy 01/18/2022    History of pneumococcal vaccination    Personal history of other endocrine, nutritional and metabolic disease 12/01/2021    History of obesity    Personal history of other medical treatment 10/08/2019    History of screening mammography    Personal history of other specified conditions 11/17/2021    History of edema     Personal history of other specified conditions 09/14/2022    History of palpitations    Personal history of other specified conditions 07/15/2021    History of fatigue    Personal history of other specified conditions 03/25/2022    History of abdominal pain    Personal history of other specified conditions 08/31/2022    History of postnasal drip    Plantar fascial fibromatosis     Plantar fasciitis    Posterior tibial tendinitis, left leg 03/13/2021    Tibialis tendinitis of left lower extremity    Spontaneous ecchymoses 11/17/2021    Petechiae    Strain of other muscle(s) and tendon(s) at lower leg level, right leg, initial encounter 06/18/2020    Strain of right calf muscle       Social History  Social History     Tobacco Use    Smoking status: Never    Smokeless tobacco: Never   Vaping Use    Vaping Use: Never used   Substance Use Topics    Alcohol use: Yes    Drug use: Never       Family History     Family History   Problem Relation Name Age of Onset    Hypertension Mother      Stroke Father      Hypertension Father      Breast cancer Mother's Sister          Allergies:  Allergies   Allergen Reactions    Lisinopril Cough        Outpatient Medications:  Current Outpatient Medications   Medication Instructions    calcium 500 mg calcium (1,250 mg) tablet 1 tablet, oral, Daily    cholecalciferol (Vitamin D-3) 50 mcg (2,000 unit) capsule 2 capsules, oral, Daily    docusate sodium (Colace) 100 mg capsule 2 capsules, oral, Daily    dofetilide (Tikosyn) 500 mcg capsule 1 capsule, oral, 2 times daily    Eliquis 5 mg tablet 1 tablet, oral, 2 times daily    hydrALAZINE (APRESOLINE) 50 mg, oral, 3 times daily    ketoconazole (NIZOral) 2 % cream Topical, 2 times daily    magnesium oxide (Mag-Ox) 400 mg (241.3 mg magnesium) tablet 1 tablet, oral, Daily    melatonin 2 mg, oral, Nightly    metaxalone (SKELAXIN) 800 mg, oral, Daily PRN    metoprolol succinate XL (Toprol-XL) 25 mg 24 hr tablet 1 tablet, oral, Nightly     miconazole (Lotrimin AF) 2 % powder Topical, 2 times daily    mometasone (Elocon) 0.1 % cream Apply to under breast and scaly areas of scalp twice a day as needed    multivitamin with minerals (multivitamin-iron-folic acid) tablet 1 tablet, oral, Daily    ondansetron ODT (ZOFRAN-ODT) 4 mg, oral, 3 times daily PRN    oxymetazoline (Afrin, oxymetazoline,) 0.05 % nasal spray 1 spray, Each Nostril, Every 12 hours PRN, Do not use more than 3 doses in any 7-day period.    Ozempic 1 mg, subcutaneous, Weekly    rosuvastatin (CRESTOR) 5 mg, oral, Daily    topiramate (TOPAMAX) 50 mg, oral, 2 times daily    Ubrelvy 100 mg, oral, As needed    valsartan (Diovan) 320 mg tablet TAKE 1 TABLET BY MOUTH EVERY DAY    white petrolatum-mineral oil (Refresh P.M.) 57.3-42.5 % ophthalmic ointment Both Eyes, Once          REVIEW OF SYSTEMS  Review of Systems   All other systems reviewed and are negative.        VITALS  Vitals:    01/29/24 1406   BP: 140/82   Pulse: 56       PHYSICAL EXAM  Vitals and nursing note reviewed.   Constitutional:       Appearance: Normal appearance.   HENT:      Head: Normocephalic.   Neck:      Vascular: No JVD.   Cardiovascular:      Rate and Rhythm: Normal rate and regular rhythm.      Pulses: Normal pulses.      Heart sounds: Normal heart sounds.   Pulmonary:      Effort: Pulmonary effort is normal.      Breath sounds: Normal breath sounds.   Abdominal:      General: Bowel sounds are normal.      Palpations: Abdomen is soft.   Musculoskeletal:         General: Normal range of motion.      Cervical back: Normal range of motion.   Skin:     General: Skin is warm and dry.   Neurological:      General: No focal deficit present.      Mental Status: She is alert and oriented to person, place, and time.      Motor: Motor function is intact.   Psychiatric:         Attention and Perception: Attention and perception normal.         Mood and Affect: Mood and affect normal.         Speech: Speech normal.         Behavior:  Behavior normal. Behavior is cooperative.         Thought Content: Thought content normal.         Cognition and Memory: Cognition and memory normal.        Labs and testing: Twelve-lead EKG reveals sinus bradycardia 56 bpm.  Right bundle branch block is noted QRS durations 122 ms,  ms, QTc 461 ms.  No acute ischemic changes are noted.  2D echocardiogram dated February 20, 2023 revealed an ejection fraction of 65 to 70%, slight increase in outflow tract obstruction, systolic anterior motion, 1+ MR and thickening of the mitral valve leaflets.    ASSESSMENT AND PLAN    Clinical impressions:  1. Paroxysmal atrial fibrillation with increased burden on flecainide, controlled on dofetilide, metoprolol, magnesium oxide, and anticoagulated with Eliquis.  2. Hypertension, controlled with a blood pressure today 140/82.  3. Partial right mastectomy and radiation therapy with surgical margins being clear and currently on surveillance screening.  4. Normal coronaries per left heart catheterization dated September 27, 2019.  5. Normal left ventricular function per RAIZA dated September 27, 2019.  6. Sinus node dysfunction.  7. Right bundle branch block per twelve-lead EKG.  8. Class I obesity with a BMI 29.62.  9.  Recurrent epistaxis on Eliquis.    Recommendations:  1.  Continue current medications as prescribed.  2.  I discussed in detail with the patient the use of the Watchman filter.  The patient states that she was informed that she is not a candidate for a watchman filter and that epistaxis does not qualify patient for the filter.  I discussed with the patient that recurrent epistaxis is an approved diagnosis for watchman filter.  I recommended referral to Dr. Mckeon who could answer her questions.  Patient states that she would like to hold off until after she undergoes evaluation for her CPAP.  3.  Patient will undergo telehealth visit to discuss titration of CPAP and CPAP humidity.  4.  Follow-up in office with   Ferrer in 6 months or sooner if needed.  5.  Continue lifestyle modifications as discussed.    Evaluation and note by Fabi Mejia, CNP  **Please excuse any errors in grammar or translation related to this dictation.  Voice recognition software was utilized to prepare this document.**

## 2024-01-31 ENCOUNTER — SPECIALTY PHARMACY (OUTPATIENT)
Dept: PHARMACY | Facility: CLINIC | Age: 59
End: 2024-01-31

## 2024-02-07 ENCOUNTER — APPOINTMENT (OUTPATIENT)
Dept: CARDIOLOGY | Facility: CLINIC | Age: 59
End: 2024-02-07
Payer: COMMERCIAL

## 2024-02-13 ENCOUNTER — OFFICE VISIT (OUTPATIENT)
Dept: SLEEP MEDICINE | Facility: HOSPITAL | Age: 59
End: 2024-02-13
Payer: COMMERCIAL

## 2024-02-13 DIAGNOSIS — G47.33 OBSTRUCTIVE SLEEP APNEA: Primary | ICD-10-CM

## 2024-02-13 PROCEDURE — 3008F BODY MASS INDEX DOCD: CPT | Performed by: GENERAL PRACTICE

## 2024-02-13 PROCEDURE — 99204 OFFICE O/P NEW MOD 45 MIN: CPT | Performed by: GENERAL PRACTICE

## 2024-02-13 PROCEDURE — 1036F TOBACCO NON-USER: CPT | Performed by: GENERAL PRACTICE

## 2024-02-13 ASSESSMENT — COLUMBIA-SUICIDE SEVERITY RATING SCALE - C-SSRS
1. IN THE PAST MONTH, HAVE YOU WISHED YOU WERE DEAD OR WISHED YOU COULD GO TO SLEEP AND NOT WAKE UP?: NO
6. HAVE YOU EVER DONE ANYTHING, STARTED TO DO ANYTHING, OR PREPARED TO DO ANYTHING TO END YOUR LIFE?: NO
2. HAVE YOU ACTUALLY HAD ANY THOUGHTS OF KILLING YOURSELF?: NO

## 2024-02-13 ASSESSMENT — PATIENT HEALTH QUESTIONNAIRE - PHQ9
2. FEELING DOWN, DEPRESSED OR HOPELESS: NOT AT ALL
1. LITTLE INTEREST OR PLEASURE IN DOING THINGS: NOT AT ALL
SUM OF ALL RESPONSES TO PHQ9 QUESTIONS 1 AND 2: 0

## 2024-02-13 ASSESSMENT — SLEEP AND FATIGUE QUESTIONNAIRES
DIFFICULTY_FALLING_ASLEEP: MILD
SATISFACTION_WITH_CURRENT_SLEEP_PATTERN: SATISFIED
SLEEP_PROBLEM_INTERFERES_DAILY_ACTIVITIES: A LITTLE
HOW LIKELY ARE YOU TO NOD OFF OR FALL ASLEEP WHILE SITTING QUIETLY AFTER LUNCH WITHOUT ALCOHOL: WOULD NEVER DOZE
SITING INACTIVE IN A PUBLIC PLACE LIKE A CLASS ROOM OR A MOVIE THEATER: WOULD NEVER DOZE
HOW LIKELY ARE YOU TO NOD OFF OR FALL ASLEEP WHEN YOU ARE A PASSENGER IN A CAR FOR AN HOUR WITHOUT A BREAK: WOULD NEVER DOZE
DIFFICULTY_STAYING_ASLEEP: MODERATE
HOW LIKELY ARE YOU TO NOD OFF OR FALL ASLEEP WHILE SITTING AND READING: SLIGHT CHANCE OF DOZING
SLEEP_PROBLEM_NOTICEABLE_TO_OTHERS: A LITTLE
HOW LIKELY ARE YOU TO NOD OFF OR FALL ASLEEP WHILE WATCHING TV: WOULD NEVER DOZE
WORRIED_DISTRESSED_DUE_TO_SLEEP: MUCH
HOW LIKELY ARE YOU TO NOD OFF OR FALL ASLEEP IN A CAR, WHILE STOPPED FOR A FEW MINUTES IN TRAFFIC: WOULD NEVER DOZE
HOW LIKELY ARE YOU TO NOD OFF OR FALL ASLEEP WHILE SITTING AND TALKING TO SOMEONE: WOULD NEVER DOZE
HOW LIKELY ARE YOU TO NOD OFF OR FALL ASLEEP WHILE LYING DOWN TO REST IN THE AFTERNOON WHEN CIRCUMSTANCES PERMIT: SLIGHT CHANCE OF DOZING
ESS-CHAD TOTAL SCORE: 2

## 2024-02-13 ASSESSMENT — ENCOUNTER SYMPTOMS
OCCASIONAL FEELINGS OF UNSTEADINESS: 0
DEPRESSION: 0
LOSS OF SENSATION IN FEET: 0

## 2024-02-13 NOTE — PROGRESS NOTES
Patient: Aimee Ponce    [id number]  : 1965 -- AGE 58 y.o.    Provider: Isabel Penaloza DO     Location [facility]   Service Date: 2024              [facility]  New Visit Note        HISTORY OF PRESENT ILLNESS     The patient's referring provider is: Lang Wu DO    Virtual or Telephone Consent  An interactive audio and video telecommunication system which permits real time communications between the patient (at the originating site) and provider (at the distant site) was utilized to provide this telehealth service.     Verbal consent was requested and obtained from Aimee Ponce on this date, 24 for a telehealth visit.     HISTORY OF PRESENT ILLNESS   Aimee Ponce is a 58 y.o. female who presents to a [facility] for a sleep medicine evaluation with concerns of referal  (Nose bleeds stop using cpap machine. Needs setting adjusted. /Sleep study done new pt.  ) and Sleep Apnea.     The patient  has a past medical history of Breast cancer (CMS/[facility]), Cervicalgia (2021), Corns and callosities (2022), Encounter for screening for malignant neoplasm of colon (2020), Ingrowing nail (11/15/2022), Low back pain, unspecified (2020), Meralgia paresthetica, left lower limb (2020), Obesity, unspecified (2022), Otalgia, left ear (2020), Other chronic allergic conjunctivitis (10/06/2021), Other conditions influencing health status, Other conditions influencing health status (02/10/2022), Other instability, right ankle (2021), Other obesity due to excess calories (2022), Other specified soft tissue disorders (2021), Otitis media, unspecified, left ear (2020), Pain in left leg (2021), Pain in unspecified ankle and joints of unspecified foot (2021), Pain in unspecified hip (2022), Personal history of diseases of the skin and  subcutaneous tissue (12/07/2021), Personal history of other diseases of the circulatory system (09/14/2022), Personal history of other diseases of the circulatory system (12/01/2021), Personal history of other diseases of the digestive system (03/25/2022), Personal history of other diseases of the female genital tract, Personal history of other diseases of the respiratory system (02/10/2022), Personal history of other diseases of the respiratory system (08/05/2022), Personal history of other drug therapy (01/18/2022), Personal history of other endocrine, nutritional and metabolic disease (12/01/2021), Personal history of other medical treatment (10/08/2019), Personal history of other specified conditions (11/17/2021), Personal history of other specified conditions (09/14/2022), Personal history of other specified conditions (07/15/2021), Personal history of other specified conditions (03/25/2022), Personal history of other specified conditions (08/31/2022), Plantar fascial fibromatosis, Posterior tibial tendinitis, left leg (03/13/2021), Spontaneous ecchymoses (11/17/2021), and Strain of other muscle(s) and tendon(s) at lower leg level, right leg, initial encounter (06/18/2020)..    PAST SLEEP HISTORY    Patient had a sleep study in 2021 due to hx of A-fib. She was trying to use pap therapy regularly. She has recently struggled with nose bleeds especially that she is on Eliquis; thus she has not been using her pap therapy regularly. She is establishing care for her sleep apnea.     Sleep schedule  on weekdays / work days:  Usual Bedtime: 10:30-11:30pm  Sleep latency: within an hour  Wake time : 5:15am  Total sleep time average/day:  5-6 hours/day  Awakenings: 2-3x per night, nocturia, short.   Naps: no      Sleep schedule  on weekends/non work days :  Usual Bedtime:   10:30-11:30pm   Sleep latency:  within an hour   Wake time : 6:30-7:30am     Sleep aid: melatonin and calm supplement.   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      Sleep-related ROS:    Snoring:  n  Witnessed apneas:   n     Gasping/ chocking: n        Am Dry mouth:  sometimes            Nasal congestion:   sometimes  , saline spray.   am headaches: n    Sleep is described as refreshing.     Daytime sleepiness: sometimes   Fatigue or decreased energy: sometimes   Difficulty remembering things in daytime: n  Difficulty staying focused in daytime: n  Irritable during the day: n  Drowsy driving: n  Hx of car accident: n  Near-miss Car accident: n      RLS screen:  RLSSCREEN: - Sensations: Patient does not have unusual sensations in their extremities that cause an urge to move them     Sleep-related behaviors: sometimes talks       Sleep environment:  Bed partner :  boyfriend  Pets in bed :   n  Bedroom temperature: BEDROOM TEMP: cool  Noise :   n  Issues with bed comfort : n      ESS: 2       REVIEW OF SYSTEMS     REVIEW OF SYSTEMS  Review of Systems   All other systems reviewed and are negative.          ALLERGIES AND MEDICATIONS     ALLERGIES  Allergies   Allergen Reactions    Lisinopril Cough       MEDICATIONS  Current Outpatient Medications   Medication Sig Dispense Refill    calcium 500 mg calcium (1,250 mg) tablet Take 1 tablet (1,250 mg) by mouth once daily.      cholecalciferol (Vitamin D-3) 50 mcg (2,000 unit) capsule Take 2 capsules (100 mcg) by mouth early in the morning..      docusate sodium (Colace) 100 mg capsule Take 2 capsules (200 mg) by mouth once daily.      dofetilide (Tikosyn) 500 mcg capsule Take 1 capsule (500 mcg) by mouth 2 times a day. 180 capsule 3    Eliquis 5 mg tablet Take 1 tablet (5 mg) by mouth 2 times a day. 180 tablet 3    hydrALAZINE (Apresoline) 50 mg tablet TAKE 1 TABLET BY MOUTH THREE TIMES A DAY (Patient taking differently: Take 1 tablet (50 mg) by mouth 2 times a day.) 270 tablet 0    ketoconazole (NIZOral) 2 % cream Apply topically 2 times a day. 30 g 11    magnesium oxide  (Mag-Ox) 400 mg (241.3 mg magnesium) tablet Take 1 tablet (400 mg) by mouth once daily. 90 tablet 3    melatonin 1 mg tablet,chewable Chew 2 mg once daily at bedtime.      metaxalone (Skelaxin) 800 mg tablet Take 1 tablet (800 mg) by mouth once daily as needed.      metoprolol succinate XL (Toprol-XL) 25 mg 24 hr tablet Take 1 tablet (25 mg) by mouth once daily at bedtime. 90 tablet 3    miconazole (Lotrimin AF) 2 % powder Apply topically 2 times a day. (Patient taking differently: Apply topically if needed.) 85 g 11    mometasone (Elocon) 0.1 % cream Apply to under breast and scaly areas of scalp twice a day as needed      multivitamin with minerals (multivitamin-iron-folic acid) tablet Take 1 tablet by mouth once daily.      ondansetron ODT (Zofran-ODT) 4 mg disintegrating tablet Take 1 tablet (4 mg) by mouth 3 times a day as needed for nausea.      oxymetazoline (Afrin, oxymetazoline,) 0.05 % nasal spray Administer 1 spray into each nostril every 12 hours if needed for congestion for up to 3 doses. Do not use more than 3 doses in any 7-day period. 30 mL 0    rosuvastatin (Crestor) 5 mg tablet Take 1 tablet (5 mg) by mouth once daily. 90 tablet 3    semaglutide (Ozempic) 1 mg/dose (4 mg/3 mL) pen injector Inject 1 mg under the skin 1 (one) time per week.      topiramate (Topamax) 50 mg tablet TAKE 1 TABLET (50 MG) BY MOUTH IN THE MORNING AND BEFORE BEDTIME 90 tablet 0    Ubrelvy 100 mg tablet tablet Take 1 tablet (100 mg) by mouth if needed (migraine). 16 tablet 5    valsartan (Diovan) 320 mg tablet TAKE 1 TABLET BY MOUTH EVERY DAY 90 tablet 1    white petrolatum-mineral oil (Refresh P.M.) 57.3-42.5 % ophthalmic ointment Apply to both eyes 1 time.       No current facility-administered medications for this visit.         PAST HISTORY     PAST MEDICAL HISTORY  She  has a past medical history of Breast cancer (CMS/HCC), Cervicalgia (09/21/2021), Corns and callosities (03/25/2022), Encounter for screening for  malignant neoplasm of colon (08/17/2020), Ingrowing nail (11/15/2022), Low back pain, unspecified (08/13/2020), Meralgia paresthetica, left lower limb (08/13/2020), Obesity, unspecified (08/31/2022), Otalgia, left ear (04/20/2020), Other chronic allergic conjunctivitis (10/06/2021), Other conditions influencing health status, Other conditions influencing health status (02/10/2022), Other instability, right ankle (04/21/2021), Other obesity due to excess calories (06/14/2022), Other specified soft tissue disorders (05/27/2021), Otitis media, unspecified, left ear (04/20/2020), Pain in left leg (03/13/2021), Pain in unspecified ankle and joints of unspecified foot (04/21/2021), Pain in unspecified hip (09/21/2022), Personal history of diseases of the skin and subcutaneous tissue (12/07/2021), Personal history of other diseases of the circulatory system (09/14/2022), Personal history of other diseases of the circulatory system (12/01/2021), Personal history of other diseases of the digestive system (03/25/2022), Personal history of other diseases of the female genital tract, Personal history of other diseases of the respiratory system (02/10/2022), Personal history of other diseases of the respiratory system (08/05/2022), Personal history of other drug therapy (01/18/2022), Personal history of other endocrine, nutritional and metabolic disease (12/01/2021), Personal history of other medical treatment (10/08/2019), Personal history of other specified conditions (11/17/2021), Personal history of other specified conditions (09/14/2022), Personal history of other specified conditions (07/15/2021), Personal history of other specified conditions (03/25/2022), Personal history of other specified conditions (08/31/2022), Plantar fascial fibromatosis, Posterior tibial tendinitis, left leg (03/13/2021), Spontaneous ecchymoses (11/17/2021), and Strain of other muscle(s) and tendon(s) at lower leg level, right leg, initial  encounter (06/18/2020).      PAST SURGICAL HISTORY:  Past Surgical History:   Procedure Laterality Date    APPENDECTOMY  03/14/2016    Appendectomy    BREAST LUMPECTOMY      BUNIONECTOMY  03/14/2016    Simple Bunion Exostectomy ([facility] Procedure)    OTHER SURGICAL HISTORY  11/17/2021    Tonsillectomy with adenoidectomy    OTHER SURGICAL HISTORY  11/17/2021    Bunionectomy    OTHER SURGICAL HISTORY  11/17/2021    Cervical loop electrosurgical excision    OTHER SURGICAL HISTORY  06/22/2022    Lumpectomy       FAMILY HISTORY  Family History   Problem Relation Name Age of Onset    Hypertension Mother      Stroke Father      Hypertension Father      Breast cancer Mother's Sister       DOES/DOES NOT EC: does not have a family history of sleep disorder.      SOCIAL HISTORY  She  reports that she has never smoked. She has never used smokeless tobacco. She reports current alcohol use. She reports that she does not use drugs.   Caffeine consumption: n  Alcohol consumption: occasionally   Smoking: n  Marijuana: n  Other drugs: n      PHYSICAL EXAM     VITAL SIGNS: There were no vitals taken for this visit.     PREVIOUS WEIGHTS:  Wt Readings from Last 3 Encounters:   01/29/24 80.7 kg (178 lb)   01/10/24 84.8 kg (187 lb)   01/08/24 84.8 kg (187 lb)       Physical Exam  CONSTITUTIONAL: Patient appears well developed and well nourished.   GENERAL: This is a healthy appearing patient . The patient is alert and appropriately verbally conversant   FACE: The face was inspected and no cutaneous masses or lesions were visualized.   EYES: Extra-ocular muscle function was intact.  ORAL CAVITY: Examination of the oral cavity revealed no mass lesions nor infection.   EARS: Examination of the ears revealed that the auricles were normally formed with no lesions.   NECK: No skin lesions or inflammatory processes were detected on visual inspection.  CARDIOVASCULAR: Peripheral perfusion intact, no evidence of cyanosis, or clubbing.  "  RESPIRATORY: Normal inspiration and expiration and chest wall expansion, no use of accessory muscles to breathe, no stridor.  NEUROLOGICAL: Mentation is clear. Alert and oriented to time, place, and person. Answering questions appropriately.  PSYCHIATRIC: Normal affect and appropriate behavior. Mood is without obvious agitation or disturbance.         RESULTS/DATA     No results found for: \"IRON\", \"TRANSFERRIN\", \"IRONSAT\", \"TIBC\", \"FERRITIN\"            ASSESSMENT/PLAN     Ms. Ponce is a 58 y.o. female and  has a past medical history of Breast cancer (CMS/[facility]), Cervicalgia (09/21/2021), Corns and callosities (03/25/2022), Encounter for screening for malignant neoplasm of colon (08/17/2020), Ingrowing nail (11/15/2022), Low back pain, unspecified (08/13/2020), Meralgia paresthetica, left lower limb (08/13/2020), Obesity, unspecified (08/31/2022), Otalgia, left ear (04/20/2020), Other chronic allergic conjunctivitis (10/06/2021), Other conditions influencing health status, Other conditions influencing health status (02/10/2022), Other instability, right ankle (04/21/2021), Other obesity due to excess calories (06/14/2022), Other specified soft tissue disorders (05/27/2021), Otitis media, unspecified, left ear (04/20/2020), Pain in left leg (03/13/2021), Pain in unspecified ankle and joints of unspecified foot (04/21/2021), Pain in unspecified hip (09/21/2022), Personal history of diseases of the skin and subcutaneous tissue (12/07/2021), Personal history of other diseases of the circulatory system (09/14/2022), Personal history of other diseases of the circulatory system (12/01/2021), Personal history of other diseases of the digestive system (03/25/2022), Personal history of other diseases of the female genital tract, Personal history of other diseases of the respiratory system (02/10/2022), Personal history of other diseases of the respiratory system (08/05/2022), Personal history of other drug therapy " (01/18/2022), Personal history of other endocrine, nutritional and metabolic disease (12/01/2021), Personal history of other medical treatment (10/08/2019), Personal history of other specified conditions (11/17/2021), Personal history of other specified conditions (09/14/2022), Personal history of other specified conditions (07/15/2021), Personal history of other specified conditions (03/25/2022), Personal history of other specified conditions (08/31/2022), Plantar fascial fibromatosis, Posterior tibial tendinitis, left leg (03/13/2021), Spontaneous ecchymoses (11/17/2021), and Strain of other muscle(s) and tendon(s) at lower leg level, right leg, initial encounter (06/18/2020). She was referred to the [facility] for evaluation of sleep     Problem List Items Addressed This Visit       Obstructive sleep apnea       Problem List and Orders  Pmhx includes A-fib, HTN, migraines.     1- MEGAN   HST 8/19/21 --> mild MEGAN, AHI 3% 8; weight 200  Titration 10/7/21 --> consider CPAP 12cwp; weight 91kg.     Reviewed and discussed the above sleep study results and management options in details. All questions answered, patient verbalizes understanding.     -adjusting settings from 5-20 cwp, rAHI 0.4, but very little usage. To Autopap 4-12cwp; as patient is concerned that the higher settings may trigger nose bleed; will try the 4-12cwp and adjust based on downloads an symptoms.     -do not drive or operate heavy machinery if drowsy.  -avoid sleeping on your back.   -avoid sedating substances/ medication, alcohol, illicit drugs and tobacco.      2- Obesity  counseled on eating a healthy diet and exercising as tolerated.    Her weight goal is around 166lb; she will let us know when she is ready for another sleep study.     Follow up in 2 months or sooner as needed.

## 2024-02-22 ENCOUNTER — PATIENT MESSAGE (OUTPATIENT)
Dept: SLEEP MEDICINE | Facility: HOSPITAL | Age: 59
End: 2024-02-22
Payer: COMMERCIAL

## 2024-02-22 DIAGNOSIS — G47.33 OBSTRUCTIVE SLEEP APNEA: Primary | ICD-10-CM

## 2024-02-26 DIAGNOSIS — E66.09 CLASS 1 OBESITY DUE TO EXCESS CALORIES WITH SERIOUS COMORBIDITY AND BODY MASS INDEX (BMI) OF 34.0 TO 34.9 IN ADULT: ICD-10-CM

## 2024-02-27 ENCOUNTER — TELEPHONE (OUTPATIENT)
Dept: PULMONOLOGY | Facility: CLINIC | Age: 59
End: 2024-02-27
Payer: COMMERCIAL

## 2024-02-27 ENCOUNTER — DOCUMENTATION (OUTPATIENT)
Dept: PULMONOLOGY | Facility: CLINIC | Age: 59
End: 2024-02-27
Payer: COMMERCIAL

## 2024-02-27 RX ORDER — TOPIRAMATE 50 MG/1
50 TABLET, FILM COATED ORAL 2 TIMES DAILY
Qty: 90 TABLET | Refills: 0 | Status: SHIPPED | OUTPATIENT
Start: 2024-02-27 | End: 2024-05-31

## 2024-02-27 NOTE — TELEPHONE ENCOUNTER
Pt. Called office stating that she is still having nosebleeds when she uses her CPAP machine.     Pt also stated that she had dark blood drainage from her nose and she stopped using her CPAP machine last week Thursday.    Pt wanted me to let you know so you are aware of it.    Please advise.

## 2024-02-27 NOTE — TELEPHONE ENCOUNTER
----- Message from Aimee Ponce sent at 2/22/2024  4:03 PM EST -----  Regarding: Cpap   Contact: [phone]  I am concerned about using my cpap right now. I did not use it last night.  I am getting a lot of dark brown nasal drainage and congestion at night. I have been using my ocean nasal spray and humidifier as told by the ENT. I called  the ENT and scheduled an appointment next available March 12.

## 2024-02-28 ENCOUNTER — PHARMACY VISIT (OUTPATIENT)
Dept: PHARMACY | Facility: CLINIC | Age: 59
End: 2024-02-28
Payer: COMMERCIAL

## 2024-02-28 ENCOUNTER — SPECIALTY PHARMACY (OUTPATIENT)
Dept: PHARMACY | Facility: CLINIC | Age: 59
End: 2024-02-28

## 2024-02-28 PROCEDURE — RXMED WILLOW AMBULATORY MEDICATION CHARGE

## 2024-03-12 ENCOUNTER — OFFICE VISIT (OUTPATIENT)
Dept: OTOLARYNGOLOGY | Facility: CLINIC | Age: 59
End: 2024-03-12
Payer: COMMERCIAL

## 2024-03-12 DIAGNOSIS — J34.89 NASAL VESTIBULITIS: Primary | ICD-10-CM

## 2024-03-12 DIAGNOSIS — J31.0 CHRONIC RHINITIS: ICD-10-CM

## 2024-03-12 PROCEDURE — 99203 OFFICE O/P NEW LOW 30 MIN: CPT | Performed by: PHYSICIAN ASSISTANT

## 2024-03-12 PROCEDURE — 3008F BODY MASS INDEX DOCD: CPT | Performed by: PHYSICIAN ASSISTANT

## 2024-03-12 PROCEDURE — 1036F TOBACCO NON-USER: CPT | Performed by: PHYSICIAN ASSISTANT

## 2024-03-12 RX ORDER — MUPIROCIN 20 MG/G
OINTMENT TOPICAL
Qty: 22 G | Refills: 2 | Status: SHIPPED | OUTPATIENT
Start: 2024-03-12 | End: 2024-04-11

## 2024-03-12 NOTE — PROGRESS NOTES
Aimee Ponce is a 58 y.o. year old female patient with SINUS DRAINAGE  Patient presents to the office today for assessment of nose.  Patient had history of 4 bouts of epistaxis.  She currently takes Eliquis due to atrial fibrillation and does utilize CPAP for sleep apnea.  She was cauterized by an outside ENT physician back in December.  She has had some scant bleeding since that time but mostly concerned about some clear drainage.  She is without other ENT related concerns.    Review of Systems   All other systems reviewed and are negative.        Physical Exam:   General appearance: No acute distress. Normal facies. Symmetric facial movement. No gross lesions of the face are noted.  The external ear structures appear normal. The ear canals patent and the tympanic membranes are intact without evidence of air-fluid levels, retraction, or congenital defects.  Anterior rhinoscopy notes essentially a midline nasal septum.  Patient with scabbing and crusting present in the right nasal vault debrided with suction.  Examination is noted for normal healthy mucosal membranes without any evidence of lesions, polyps, or exudate. The tongue is normally mobile. There are no lesions on the gingiva, buccal, or oral mucosa. There are no oral cavity masses.  The neck is negative for mass lymphadenopathy. The trachea and parotid are clear. The thyroid bed is grossly unremarkable. The salivary gland structures are grossly unremarkable.    Assessment/Plan   1.  Nasal vestibulitis  2.  Chronic rhinitis    Patient seen in the office today for assessment of nose with nasal vestibulitis and chronic rhinitis.  The nose does not have any evidence of area needing cauterization today but will be treated with Bactroban.  Certainly due to chronic rhinitis and drainage complaints the patient may benefit from Atrovent but I would fear nasal dryness therefore we will hold off.  Patient will utilize Bactroban and nasal saline and contact us in a  month.  If doing well we may trial the patient on Atrovent.

## 2024-03-22 ENCOUNTER — SPECIALTY PHARMACY (OUTPATIENT)
Dept: PHARMACY | Facility: CLINIC | Age: 59
End: 2024-03-22

## 2024-03-25 PROBLEM — J34.89 NASAL VESTIBULITIS: Status: ACTIVE | Noted: 2024-03-25

## 2024-03-27 ENCOUNTER — LAB (OUTPATIENT)
Dept: LAB | Facility: LAB | Age: 59
End: 2024-03-27
Payer: COMMERCIAL

## 2024-03-27 ENCOUNTER — SPECIALTY PHARMACY (OUTPATIENT)
Dept: PHARMACY | Facility: CLINIC | Age: 59
End: 2024-03-27

## 2024-03-27 DIAGNOSIS — E78.2 MIXED HYPERLIPIDEMIA: ICD-10-CM

## 2024-03-27 DIAGNOSIS — R73.02 GLUCOSE INTOLERANCE (IMPAIRED GLUCOSE TOLERANCE): ICD-10-CM

## 2024-03-27 DIAGNOSIS — I10 ESSENTIAL HYPERTENSION: ICD-10-CM

## 2024-03-27 LAB
ALBUMIN SERPL BCP-MCNC: 4.6 G/DL (ref 3.4–5)
ALP SERPL-CCNC: 77 U/L (ref 33–110)
ALT SERPL W P-5'-P-CCNC: 21 U/L (ref 7–45)
ANION GAP SERPL CALC-SCNC: 12 MMOL/L (ref 10–20)
AST SERPL W P-5'-P-CCNC: 18 U/L (ref 9–39)
BASOPHILS # BLD AUTO: 0.04 X10*3/UL (ref 0–0.1)
BASOPHILS NFR BLD AUTO: 0.8 %
BILIRUB SERPL-MCNC: 0.4 MG/DL (ref 0–1.2)
BUN SERPL-MCNC: 20 MG/DL (ref 6–23)
CALCIUM SERPL-MCNC: 9.2 MG/DL (ref 8.6–10.3)
CHLORIDE SERPL-SCNC: 107 MMOL/L (ref 98–107)
CHOLEST SERPL-MCNC: 173 MG/DL (ref 0–199)
CHOLESTEROL/HDL RATIO: 3
CO2 SERPL-SCNC: 25 MMOL/L (ref 21–32)
CREAT SERPL-MCNC: 0.87 MG/DL (ref 0.5–1.05)
CREAT UR-MCNC: 70.1 MG/DL (ref 20–320)
EGFRCR SERPLBLD CKD-EPI 2021: 78 ML/MIN/1.73M*2
EOSINOPHIL # BLD AUTO: 0.13 X10*3/UL (ref 0–0.7)
EOSINOPHIL NFR BLD AUTO: 2.7 %
ERYTHROCYTE [DISTWIDTH] IN BLOOD BY AUTOMATED COUNT: 12.1 % (ref 11.5–14.5)
EST. AVERAGE GLUCOSE BLD GHB EST-MCNC: 108 MG/DL
GLUCOSE SERPL-MCNC: 82 MG/DL (ref 74–99)
HBA1C MFR BLD: 5.4 %
HCT VFR BLD AUTO: 44.7 % (ref 36–46)
HDLC SERPL-MCNC: 57.6 MG/DL
HGB BLD-MCNC: 15 G/DL (ref 12–16)
IMM GRANULOCYTES # BLD AUTO: 0.01 X10*3/UL (ref 0–0.7)
IMM GRANULOCYTES NFR BLD AUTO: 0.2 % (ref 0–0.9)
LDLC SERPL CALC-MCNC: 90 MG/DL
LYMPHOCYTES # BLD AUTO: 1.34 X10*3/UL (ref 1.2–4.8)
LYMPHOCYTES NFR BLD AUTO: 27.8 %
MAGNESIUM SERPL-MCNC: 2.19 MG/DL (ref 1.6–2.4)
MCH RBC QN AUTO: 31.6 PG (ref 26–34)
MCHC RBC AUTO-ENTMCNC: 33.6 G/DL (ref 32–36)
MCV RBC AUTO: 94 FL (ref 80–100)
MICROALBUMIN UR-MCNC: <7 MG/L
MICROALBUMIN/CREAT UR: NORMAL MG/G{CREAT}
MONOCYTES # BLD AUTO: 0.64 X10*3/UL (ref 0.1–1)
MONOCYTES NFR BLD AUTO: 13.3 %
NEUTROPHILS # BLD AUTO: 2.66 X10*3/UL (ref 1.2–7.7)
NEUTROPHILS NFR BLD AUTO: 55.2 %
NON HDL CHOLESTEROL: 115 MG/DL (ref 0–149)
NRBC BLD-RTO: 0 /100 WBCS (ref 0–0)
PLATELET # BLD AUTO: 274 X10*3/UL (ref 150–450)
POTASSIUM SERPL-SCNC: 4.1 MMOL/L (ref 3.5–5.3)
PROT SERPL-MCNC: 7.1 G/DL (ref 6.4–8.2)
RBC # BLD AUTO: 4.75 X10*6/UL (ref 4–5.2)
SODIUM SERPL-SCNC: 140 MMOL/L (ref 136–145)
TRIGL SERPL-MCNC: 128 MG/DL (ref 0–149)
TSH SERPL-ACNC: 0.73 MIU/L (ref 0.44–3.98)
VLDL: 26 MG/DL (ref 0–40)
WBC # BLD AUTO: 4.8 X10*3/UL (ref 4.4–11.3)

## 2024-03-27 PROCEDURE — 82570 ASSAY OF URINE CREATININE: CPT

## 2024-03-27 PROCEDURE — 83036 HEMOGLOBIN GLYCOSYLATED A1C: CPT

## 2024-03-27 PROCEDURE — 80053 COMPREHEN METABOLIC PANEL: CPT

## 2024-03-27 PROCEDURE — 85025 COMPLETE CBC W/AUTO DIFF WBC: CPT

## 2024-03-27 PROCEDURE — 80061 LIPID PANEL: CPT

## 2024-03-27 PROCEDURE — 83735 ASSAY OF MAGNESIUM: CPT

## 2024-03-27 PROCEDURE — 84443 ASSAY THYROID STIM HORMONE: CPT

## 2024-03-27 PROCEDURE — 82043 UR ALBUMIN QUANTITATIVE: CPT

## 2024-03-27 PROCEDURE — 36415 COLL VENOUS BLD VENIPUNCTURE: CPT

## 2024-04-03 ENCOUNTER — OFFICE VISIT (OUTPATIENT)
Dept: PRIMARY CARE | Facility: CLINIC | Age: 59
End: 2024-04-03
Payer: COMMERCIAL

## 2024-04-03 VITALS
TEMPERATURE: 97.5 F | DIASTOLIC BLOOD PRESSURE: 82 MMHG | SYSTOLIC BLOOD PRESSURE: 126 MMHG | HEART RATE: 62 BPM | HEIGHT: 65 IN | WEIGHT: 177 LBS | BODY MASS INDEX: 29.49 KG/M2 | OXYGEN SATURATION: 97 % | RESPIRATION RATE: 16 BRPM

## 2024-04-03 DIAGNOSIS — R53.83 FATIGUE, UNSPECIFIED TYPE: ICD-10-CM

## 2024-04-03 DIAGNOSIS — Z11.4 SCREENING FOR HUMAN IMMUNODEFICIENCY VIRUS WITHOUT PRESENCE OF RISK FACTORS: ICD-10-CM

## 2024-04-03 DIAGNOSIS — I10 ESSENTIAL HYPERTENSION: ICD-10-CM

## 2024-04-03 DIAGNOSIS — E78.2 MIXED HYPERLIPIDEMIA: ICD-10-CM

## 2024-04-03 DIAGNOSIS — Z00.00 ROUTINE GENERAL MEDICAL EXAMINATION AT A HEALTH CARE FACILITY: Primary | ICD-10-CM

## 2024-04-03 DIAGNOSIS — Z11.59 ENCOUNTER FOR HEPATITIS C SCREENING TEST FOR LOW RISK PATIENT: ICD-10-CM

## 2024-04-03 PROBLEM — R04.0 EPISTAXIS: Status: RESOLVED | Noted: 2024-01-29 | Resolved: 2024-04-03

## 2024-04-03 PROBLEM — M99.9 NONALLOPATHIC LESION OF SACROILIAC REGION: Status: RESOLVED | Noted: 2023-03-15 | Resolved: 2024-04-03

## 2024-04-03 PROBLEM — R00.2 PALPITATIONS: Status: RESOLVED | Noted: 2023-03-15 | Resolved: 2024-04-03

## 2024-04-03 PROBLEM — I89.0 LYMPHEDEMA: Status: RESOLVED | Noted: 2023-03-15 | Resolved: 2024-04-03

## 2024-04-03 PROBLEM — R53.1 WEAKNESS GENERALIZED: Status: RESOLVED | Noted: 2023-03-15 | Resolved: 2024-04-03

## 2024-04-03 PROBLEM — R10.9 ABDOMINAL PAIN: Status: RESOLVED | Noted: 2023-03-15 | Resolved: 2024-04-03

## 2024-04-03 PROBLEM — R09.81 SINUS CONGESTION: Status: RESOLVED | Noted: 2023-03-15 | Resolved: 2024-04-03

## 2024-04-03 PROBLEM — G89.18 POST-OP PAIN: Status: RESOLVED | Noted: 2023-03-15 | Resolved: 2024-04-03

## 2024-04-03 PROBLEM — J34.89 NASAL VESTIBULITIS: Status: RESOLVED | Noted: 2024-03-25 | Resolved: 2024-04-03

## 2024-04-03 PROBLEM — Z78.9 NEVER SMOKED CIGARETTES: Status: RESOLVED | Noted: 2023-12-29 | Resolved: 2024-04-03

## 2024-04-03 PROCEDURE — 1036F TOBACCO NON-USER: CPT | Performed by: FAMILY MEDICINE

## 2024-04-03 PROCEDURE — 3074F SYST BP LT 130 MM HG: CPT | Performed by: FAMILY MEDICINE

## 2024-04-03 PROCEDURE — 3008F BODY MASS INDEX DOCD: CPT | Performed by: FAMILY MEDICINE

## 2024-04-03 PROCEDURE — 3079F DIAST BP 80-89 MM HG: CPT | Performed by: FAMILY MEDICINE

## 2024-04-03 PROCEDURE — 99396 PREV VISIT EST AGE 40-64: CPT | Performed by: FAMILY MEDICINE

## 2024-04-03 RX ORDER — VALSARTAN 320 MG/1
320 TABLET ORAL DAILY
Qty: 90 TABLET | Refills: 3 | Status: SHIPPED | OUTPATIENT
Start: 2024-04-03

## 2024-04-03 RX ORDER — HYDRALAZINE HYDROCHLORIDE 50 MG/1
50 TABLET, FILM COATED ORAL 2 TIMES DAILY
Qty: 180 TABLET | Refills: 3 | Status: SHIPPED | OUTPATIENT
Start: 2024-04-03 | End: 2024-05-24

## 2024-04-03 RX ORDER — ARTIFICIAL TEARS 1; 2; 3 MG/ML; MG/ML; MG/ML
1 SOLUTION/ DROPS OPHTHALMIC AS NEEDED
COMMUNITY

## 2024-04-03 ASSESSMENT — ENCOUNTER SYMPTOMS
DYSURIA: 0
CHEST TIGHTNESS: 0
SEIZURES: 0
EYE ITCHING: 0
SHORTNESS OF BREATH: 0
BLOOD IN STOOL: 0
CONFUSION: 0
ABDOMINAL PAIN: 0
FATIGUE: 1
HALLUCINATIONS: 0
NUMBNESS: 0
WEAKNESS: 0
EYE DISCHARGE: 0
DYSPHORIC MOOD: 0
NAUSEA: 0
EYE REDNESS: 0
SORE THROAT: 0
FACIAL ASYMMETRY: 0
SPEECH DIFFICULTY: 0
EYE PAIN: 0
MYALGIAS: 0
UNEXPECTED WEIGHT CHANGE: 0
FREQUENCY: 0
CHILLS: 0
SLEEP DISTURBANCE: 0
BACK PAIN: 0
CONSTIPATION: 0
DIARRHEA: 0
NECK PAIN: 0
PHOTOPHOBIA: 0
AGITATION: 0
TROUBLE SWALLOWING: 0
VOICE CHANGE: 0
BRUISES/BLEEDS EASILY: 0
CHOKING: 0
VOMITING: 0
HEMATURIA: 0
FLANK PAIN: 0
LIGHT-HEADEDNESS: 0
NECK STIFFNESS: 0
FEVER: 0
ABDOMINAL DISTENTION: 0
WOUND: 0
ARTHRALGIAS: 0
TREMORS: 0
DIZZINESS: 0
JOINT SWELLING: 0
WHEEZING: 0
SINUS PRESSURE: 0
POLYDIPSIA: 0
ADENOPATHY: 0
PALPITATIONS: 0
ACTIVITY CHANGE: 0
NERVOUS/ANXIOUS: 0
RHINORRHEA: 0
COUGH: 0
APPETITE CHANGE: 0
HEADACHES: 0
DIAPHORESIS: 0

## 2024-04-03 NOTE — PROGRESS NOTES
Subjective   Patient ID: Aimee Ponce is a 58 y.o. female who presents for 6 month check up (And review labs ) and Eye Problem (Right broken blood vessel ).         Subjective  Aimee Ponce is a 58 y.o. female and is here for a comprehensive physical exam. The patient reports no problems.    Do you take any herbs or supplements that were not prescribed by a doctor? no  Are you taking calcium supplements? yes  Are you taking aspirin daily? no      History:  LMP: No LMP recorded. Patient is postmenopausal.  Menopause at 55 years              Review of Systems   Constitutional:  Positive for fatigue. Negative for activity change, appetite change, chills, diaphoresis, fever and unexpected weight change.   HENT:  Negative for congestion, ear pain, hearing loss, nosebleeds, postnasal drip, rhinorrhea, sinus pressure, sneezing, sore throat, tinnitus, trouble swallowing and voice change.    Eyes:  Negative for photophobia, pain, discharge, redness, itching and visual disturbance.   Respiratory:  Negative for cough, choking, chest tightness, shortness of breath and wheezing.    Cardiovascular:  Negative for chest pain, palpitations and leg swelling.   Gastrointestinal:  Negative for abdominal distention, abdominal pain, blood in stool, constipation, diarrhea, nausea and vomiting.   Endocrine: Negative for cold intolerance, heat intolerance, polydipsia and polyuria.   Genitourinary:  Negative for dysuria, flank pain, frequency, hematuria and urgency.   Musculoskeletal:  Negative for arthralgias, back pain, joint swelling, myalgias, neck pain and neck stiffness.   Skin:  Positive for rash. Negative for wound.   Allergic/Immunologic: Negative for immunocompromised state.   Neurological:  Negative for dizziness, tremors, seizures, syncope, facial asymmetry, speech difficulty, weakness, light-headedness, numbness and headaches.   Hematological:  Negative for adenopathy. Does not bruise/bleed easily.  "  Psychiatric/Behavioral:  Negative for agitation, behavioral problems, confusion, dysphoric mood, hallucinations, self-injury, sleep disturbance and suicidal ideas. The patient is not nervous/anxious.        Objective   /82 (BP Location: Right arm, Patient Position: Sitting, BP Cuff Size: Large adult)   Pulse 62   Temp 36.4 °C (97.5 °F) (Temporal)   Resp 16   Ht 1.651 m (5' 5\")   Wt 80.3 kg (177 lb)   SpO2 97%   BMI 29.45 kg/m²   Physical Exam  Constitutional:       General: She is not in acute distress.     Appearance: She is obese. She is not ill-appearing or diaphoretic.   HENT:      Head: Normocephalic and atraumatic.      Right Ear: External ear normal.      Left Ear: External ear normal.      Nose: Nose normal. No rhinorrhea.   Eyes:      General: Lids are normal. No scleral icterus.        Right eye: No discharge.         Left eye: No discharge.      Conjunctiva/sclera: Conjunctivae normal.   Cardiovascular:      Rate and Rhythm: Normal rate and regular rhythm.      Pulses: Normal pulses.      Heart sounds: No murmur heard.  Pulmonary:      Effort: Pulmonary effort is normal. No respiratory distress.      Breath sounds: No decreased breath sounds, wheezing, rhonchi or rales.   Abdominal:      General: Bowel sounds are normal. There is no distension.      Palpations: Abdomen is soft. There is no mass.      Tenderness: There is no abdominal tenderness. There is no guarding or rebound.   Musculoskeletal:         General: No swelling, tenderness or deformity.      Cervical back: No rigidity or tenderness.      Right lower leg: No edema.      Left lower leg: No edema.   Lymphadenopathy:      Cervical: No cervical adenopathy.      Upper Body:      Right upper body: No supraclavicular adenopathy.      Left upper body: No supraclavicular adenopathy.   Skin:     General: Skin is warm and dry.      Coloration: Skin is not jaundiced or pale.      Findings: No erythema or lesion.   Neurological:      " General: No focal deficit present.      Mental Status: She is alert and oriented to person, place, and time.      Sensory: No sensory deficit.      Motor: No weakness or tremor.      Coordination: Coordination normal.      Gait: Gait normal.   Psychiatric:         Mood and Affect: Mood normal. Affect is not inappropriate.         Behavior: Behavior normal.       Assessment/Plan   Problem List Items Addressed This Visit       Essential hypertension    Relevant Medications    hydrALAZINE (Apresoline) 50 mg tablet    valsartan (Diovan) 320 mg tablet    Other Relevant Orders    Albumin , Urine Random    CBC and Auto Differential    Comprehensive Metabolic Panel    Lipid Panel    Magnesium    TSH with reflex to Free T4 if abnormal    Follow Up In [facility] - PCP - Established    Hyperlipidemia    Relevant Orders    Comprehensive Metabolic Panel    Lipid Panel    TSH with reflex to Free T4 if abnormal    Follow Up In [facility] - PCP - Established     Other Visit Diagnoses       Routine general medical examination at a health care facility    -  Primary    Encounter for hepatitis C screening test for low risk patient        Relevant Orders    Hepatitis C antibody    Follow Up In [facility] - PCP - Established    Screening for human immunodeficiency virus without presence of risk factors        Relevant Orders    HIV 1/2 Antigen/Antibody Screen with Reflex to Confirmation    Follow Up In [facility] - PCP - Established    Fatigue, unspecified type        Relevant Orders    Vitamin D 25-Hydroxy,Total (for eval of Vitamin D levels)         2. Patient Counseling:  --Nutrition: Stressed importance of moderation in sodium/caffeine intake, saturated fat and cholesterol, caloric balance, sufficient intake of fresh fruits, vegetables, fiber, calcium, iron  --Exercise: Stressed the importance of regular exercise.   --Substance Abuse: Discussed cessation/primary prevention of tobacco,  alcohol, or other drug use; driving or other dangerous activities under the influence; availability of treatment for abuse.   --Injury prevention: Discussed safety belts, safety helmets, smoke detector, smoking near bedding or upholstery.   --Dental health: Discussed importance of regular tooth brushing, flossing, and dental visits.  --Immunizations reviewed.  --Discussed benefits of screening colonoscopy.due 8/2033  3. Discussed the patient's BMI with her.  The BMI is above average. No BMI management plan is appropriate.  4. Follow up 6 mos wlabs

## 2024-04-04 DIAGNOSIS — H10.9 CONJUNCTIVITIS OF BOTH EYES, UNSPECIFIED CONJUNCTIVITIS TYPE: Primary | ICD-10-CM

## 2024-04-04 RX ORDER — NEOMYCIN SULFATE, POLYMYXIN B SULFATE AND DEXAMETHASONE 3.5; 10000; 1 MG/ML; [USP'U]/ML; MG/ML
1 SUSPENSION/ DROPS OPHTHALMIC 4 TIMES DAILY
Qty: 5 ML | Refills: 0 | Status: SHIPPED | OUTPATIENT
Start: 2024-04-04 | End: 2024-04-09

## 2024-04-18 ENCOUNTER — SPECIALTY PHARMACY (OUTPATIENT)
Dept: PHARMACY | Facility: CLINIC | Age: 59
End: 2024-04-18

## 2024-04-18 ENCOUNTER — OFFICE VISIT (OUTPATIENT)
Dept: OTOLARYNGOLOGY | Facility: CLINIC | Age: 59
End: 2024-04-18
Payer: COMMERCIAL

## 2024-04-18 DIAGNOSIS — J31.0 CHRONIC RHINITIS: ICD-10-CM

## 2024-04-18 DIAGNOSIS — J34.89 NASAL VESTIBULITIS: Primary | ICD-10-CM

## 2024-04-18 PROCEDURE — 99213 OFFICE O/P EST LOW 20 MIN: CPT | Performed by: PHYSICIAN ASSISTANT

## 2024-04-18 PROCEDURE — 3008F BODY MASS INDEX DOCD: CPT | Performed by: PHYSICIAN ASSISTANT

## 2024-04-18 NOTE — PROGRESS NOTES
Aimee Ponce is a 58 y.o. year old female patient with 1 MONTH FU ON NASAL VESTIBULITIS     Patient presents to the office today for assessment of nose.  Patient following up on chronic rhinitis and nasal vestibulitis symptoms.  Patient reports utilizing the Bactroban for short time but has been utilizing saline daily and finds it has been quite successful.  She complains of some drainage at this point but overall much improved.      Physical Exam:   General appearance: No acute distress. Normal facies. Symmetric facial movement. No gross lesions of the face are noted.  The external ear structures appear normal. The ear canals patent and the tympanic membranes are intact without evidence of air-fluid levels, retraction, or congenital defects.  Anterior rhinoscopy notes essentially a midline nasal septum. Examination is noted for normal healthy mucosal membranes without any evidence of lesions, polyps, or exudate. The tongue is normally mobile. There are no lesions on the gingiva, buccal, or oral mucosa. There are no oral cavity masses.  The neck is negative for mass lymphadenopathy. The trachea and parotid are clear. The thyroid bed is grossly unremarkable. The salivary gland structures are grossly unremarkable.    Assessment/Plan   1.  Chronic rhinitis  2.  Nasal vestibulitis  Patient seen in the office today for assessment of nose for follow-up on chronic rhinitis and nasal vestibulitis symptoms.  Overall the patient has distinct improvement in symptoms.  I recommend continuation of nasal saline.  We also discussed utilizing  Ayr gel at night before bed and utilize Bactroban if needed for acute flareups.  I will see the patient back as needed.

## 2024-04-27 ENCOUNTER — OFFICE VISIT (OUTPATIENT)
Dept: URGENT CARE | Facility: CLINIC | Age: 59
End: 2024-04-27
Payer: COMMERCIAL

## 2024-04-27 VITALS
WEIGHT: 175 LBS | RESPIRATION RATE: 18 BRPM | OXYGEN SATURATION: 97 % | SYSTOLIC BLOOD PRESSURE: 121 MMHG | HEIGHT: 65 IN | DIASTOLIC BLOOD PRESSURE: 81 MMHG | TEMPERATURE: 97.9 F | BODY MASS INDEX: 29.16 KG/M2 | HEART RATE: 65 BPM

## 2024-04-27 DIAGNOSIS — H69.92 EUSTACHIAN TUBE DYSFUNCTION, LEFT: Primary | ICD-10-CM

## 2024-04-27 PROCEDURE — 99213 OFFICE O/P EST LOW 20 MIN: CPT | Performed by: NURSE PRACTITIONER

## 2024-04-27 NOTE — PROGRESS NOTES
[facility] URGENT CARE  Hildadilia Aguilar, APRN-CNP     Visit Note - 4/27/2024 9:50 AM   This note was generated with voice recognition software and may contain errors including spelling, grammar, syntax, and misrecognization of what was dictated.    Patient: Aimee Ponce, MRN: [id number], 58 y.o., female   PCP: Lang Wu, DO  ------------------------------------  ALLERGIES:   Allergies   Allergen Reactions    Lisinopril Cough        CURRENT MEDICATIONS:   Current Outpatient Medications   Medication Instructions    artificial tears, dextran-hypomel-glycerin, 0.1-0.3-0.2 % ophthalmic solution 1 drop, Both Eyes, As needed    calcium 500 mg calcium (1,250 mg) tablet 1 tablet, oral, Daily    cholecalciferol (Vitamin D-3) 50 mcg (2,000 unit) capsule 2 capsules, oral, Daily    docusate sodium (Colace) 100 mg capsule 2 capsules, oral, Daily    dofetilide (TIKOSYN) 500 mcg, oral, 2 times daily    Eliquis 5 mg, oral, 2 times daily    hydrALAZINE (APRESOLINE) 50 mg, oral, 2 times daily    ketoconazole (NIZOral) 2 % cream Topical, 2 times daily    magnesium oxide (MAG-OX) 400 mg, oral, Daily    melatonin 2 mg, oral, Nightly    metaxalone (SKELAXIN) 800 mg, oral, Daily PRN    metoprolol succinate XL (TOPROL-XL) 25 mg, oral, Nightly    miconazole (Lotrimin AF) 2 % powder Topical, 2 times daily    mometasone (Elocon) 0.1 % cream Apply to under breast and scaly areas of scalp twice a day as needed    multivitamin with minerals (multivitamin-iron-folic acid) tablet 1 tablet, oral, Daily    ondansetron ODT (ZOFRAN-ODT) 4 mg, oral, 3 times daily PRN    oxymetazoline (Afrin, oxymetazoline,) 0.05 % nasal spray 1 spray, Each Nostril, Every 12 hours PRN, Do not use more than 3 doses in any 7-day period.    Ozempic 2 mg, subcutaneous, Once Weekly    rosuvastatin (CRESTOR) 5 mg, oral, Daily    topiramate (TOPAMAX) 50 mg, oral, 2 times daily    Ubrelvy 100 mg, oral, As needed    valsartan (DIOVAN) 320 mg, oral, Daily  "    ------------------------------------  PAST MEDICAL HX:  Patient Active Problem List   Diagnosis    Back pain    Ankle pain    Anxiety disorder due to known physiological condition    Carpal tunnel syndrome of left wrist    Ductal carcinoma in situ (DCIS) of right breast    Essential hypertension    Flat foot    Hyperlipidemia    Ingrown toenail    Left cervical radiculopathy    Lung nodules    Lymphedema of both lower extremities    Migraines    Neck pain, chronic    Obstructive sleep apnea    Osteoarthritis of ankle, right    Paroxysmal atrial fibrillation (Multi)    PND (post-nasal drip)    Sinus node dysfunction (Multi)    Venous insufficiency    Xerosis of skin    Chronic rhinitis      SURGICAL HX:  Past Surgical History:   Procedure Laterality Date    ADENOIDECTOMY  Age 6    APPENDECTOMY  03/14/2016    Appendectomy    BREAST LUMPECTOMY      BUNIONECTOMY  03/14/2016    Simple Bunion Exostectomy (Silver Procedure)    OTHER SURGICAL HISTORY  11/17/2021    Tonsillectomy with adenoidectomy    OTHER SURGICAL HISTORY  11/17/2021    Bunionectomy    OTHER SURGICAL HISTORY  11/17/2021    Cervical loop electrosurgical excision    OTHER SURGICAL HISTORY  06/22/2022    Lumpectomy      FAMILY HX:   No pertinent history.   SOCIAL HX:    reports that she has never smoked. She has never used smokeless tobacco.   ------------------------------------  CHIEF COMPLAINT:   Chief Complaint   Patient presents with    Cerumen Impaction     Left ear fullness x 1 day. Reports no pain       HISTORY OF PRESENT ILLNESS: The history was obtained from patientChris Yu is a 58 y.o. female, who presents with a chief complaint of L ear fullness/\"feels like something is in my ear\" intermittently x \"a few days.\" Reports her hearing seems to be normal.  She denies any ear pain. No known trauma to ear or recent swimming. Has had ongoing issues with seasonal allergies and nasal vestibulitis - following with ENT. Restarted claritin this AM. No sore " "throat, cough, change in appetite, fevers, body aches, fatigue, headaches, vomiting, dizziness, or other systemic symptoms.  She does not feel ill. She has not tried any other OTC medications or conservative measures for her symptoms.  Is here today to make sure she does not need an antibiotic, as she will be traveling via airplane next week.       REVIEW OF SYSTEMS:  10 systems reviewed negative with exception of history of present illness as listed above.    TODAY'S VITALS: /81 (BP Location: Left arm, Patient Position: Sitting, BP Cuff Size: Large adult)   Pulse 65   Temp 36.6 °C (97.9 °F) (Temporal)   Resp 18   Ht 1.651 m (5' 5\")   Wt 79.4 kg (175 lb)   SpO2 97%   BMI 29.12 kg/m²     PHYSICAL EXAMINATION:  General: Pleasant female, alert and oriented, in no acute distress.  Sitting comfortably on exam table.  Eyes: Bilat conjunctiva clear; no exudate to eyes noted.   HENT: Normocephalic.  Bilateral TMs with clear fluid bubbles, but not retracted or bulging, and no erythema.  Bilateral ear canals unremarkable.  No otorrhea.  Nasal mucosa mildly injected and with trace edema; no sinus tenderness; has mild audible nasal congestion. Mucous membranes moist. No oral lesions; posterior pharynx unremarkable. Able to swallow/manage oral secretions without difficulty.  Neck:  Supple; no palpable lymphadenopathy.  Respiratory:  Respirations are easy and non-labored, with normal rate. Symmetrical chest wall expansion. Lungs are clear to auscultation - no wheezing, rhonchi, or rales. Breath sounds equal.  No cough noted during visit.  Cardiovascular:  Normal rate, Regular rhythm.  Normal S1-S2.  No m/r/g.   Musculoskeletal:  Grossly normal for age.     Integumentary:  Pink, warm, dry, and Intact. Normal skin turgor; no rashes appreciated.   Neurologic:  Alert, Oriented, Sensory and motor function grossly intact.  No instability with position changes; gait unremarkable.  Cognition and Speech: Oriented; Speech " clear and coherent  Psychiatric:  Cooperative, Appropriate mood & affect.     ------------------------------------  Medical Decision Making  LABORATORY or RADIOLOGICAL IMAGING ORDERS/RESULTS:   None    IMPRESSION/PLAN:  Course: Worsening; stable    1. Eustachian tube dysfunction, left  Symptoms/exam consistent with mild eustachian tube dysfunction. Will begin conservative measures at this point; no indication for antibiotic use right now.  Discussed strategies for management of symptoms-saline nasal spray, gentle auto insufflation, chewing gum, yawning, etc. OTC antihistamines may also be helpful. Should continue avoidance of decongestants in light of her health issues and current meds. Reviewed expectations for resolution/improvement in symptoms (can take 4 to 6 weeks for symptoms to resolve completely), and encouraged to follow-up with PCP or ENT if symptoms not improving over the 2 weeks, or to seek care sooner if they worsen. Patient seems satisfied and agrees with plan of care; questions were encouraged and answered.           KEEGAN Villagomez-CNP   Advanced Practice Provider  [facility] URGENT CARE

## 2024-05-16 ENCOUNTER — SPECIALTY PHARMACY (OUTPATIENT)
Dept: PHARMACY | Facility: CLINIC | Age: 59
End: 2024-05-16

## 2024-05-17 ENCOUNTER — PHARMACY VISIT (OUTPATIENT)
Dept: PHARMACY | Facility: CLINIC | Age: 59
End: 2024-05-17
Payer: COMMERCIAL

## 2024-05-17 ENCOUNTER — OFFICE VISIT (OUTPATIENT)
Dept: SLEEP MEDICINE | Facility: CLINIC | Age: 59
End: 2024-05-17
Payer: COMMERCIAL

## 2024-05-17 DIAGNOSIS — G47.33 OBSTRUCTIVE SLEEP APNEA: Primary | ICD-10-CM

## 2024-05-17 PROCEDURE — 3008F BODY MASS INDEX DOCD: CPT | Performed by: GENERAL PRACTICE

## 2024-05-17 PROCEDURE — RXMED WILLOW AMBULATORY MEDICATION CHARGE

## 2024-05-17 PROCEDURE — 1036F TOBACCO NON-USER: CPT | Performed by: GENERAL PRACTICE

## 2024-05-17 PROCEDURE — 99214 OFFICE O/P EST MOD 30 MIN: CPT | Performed by: GENERAL PRACTICE

## 2024-05-17 ASSESSMENT — PATIENT HEALTH QUESTIONNAIRE - PHQ9
1. LITTLE INTEREST OR PLEASURE IN DOING THINGS: NOT AT ALL
SUM OF ALL RESPONSES TO PHQ9 QUESTIONS 1 AND 2: 0
2. FEELING DOWN, DEPRESSED OR HOPELESS: NOT AT ALL

## 2024-05-17 ASSESSMENT — SLEEP AND FATIGUE QUESTIONNAIRES
HOW LIKELY ARE YOU TO NOD OFF OR FALL ASLEEP WHILE WATCHING TV: WOULD NEVER DOZE
SITING INACTIVE IN A PUBLIC PLACE LIKE A CLASS ROOM OR A MOVIE THEATER: WOULD NEVER DOZE
HOW LIKELY ARE YOU TO NOD OFF OR FALL ASLEEP WHILE SITTING AND READING: WOULD NEVER DOZE
HOW LIKELY ARE YOU TO NOD OFF OR FALL ASLEEP WHEN YOU ARE A PASSENGER IN A CAR FOR AN HOUR WITHOUT A BREAK: WOULD NEVER DOZE
ESS-CHAD TOTAL SCORE: 1
HOW LIKELY ARE YOU TO NOD OFF OR FALL ASLEEP WHILE LYING DOWN TO REST IN THE AFTERNOON WHEN CIRCUMSTANCES PERMIT: SLIGHT CHANCE OF DOZING
HOW LIKELY ARE YOU TO NOD OFF OR FALL ASLEEP WHILE SITTING AND TALKING TO SOMEONE: WOULD NEVER DOZE
HOW LIKELY ARE YOU TO NOD OFF OR FALL ASLEEP IN A CAR, WHILE STOPPED FOR A FEW MINUTES IN TRAFFIC: WOULD NEVER DOZE
HOW LIKELY ARE YOU TO NOD OFF OR FALL ASLEEP WHILE SITTING QUIETLY AFTER LUNCH WITHOUT ALCOHOL: WOULD NEVER DOZE

## 2024-05-17 ASSESSMENT — ENCOUNTER SYMPTOMS
LOSS OF SENSATION IN FEET: 0
OCCASIONAL FEELINGS OF UNSTEADINESS: 0
DEPRESSION: 0

## 2024-05-17 NOTE — PATIENT INSTRUCTIONS
[facility] Sleep Medicine   [facility]  [facility]  [address] [address]  [phone]       NAME: Aimee Ponce   DATE: 5/17/2024     Your Sleep Provider Today: Isabel Penaloza,   Your Primary Care Physician: Lang Wu DO   Your Referring Provider: No ref. provider found    DIAGNOSIS:   1. Obstructive sleep apnea  Positive Airway Pressure (PAP) Therapy          Thank you for coming to the Sleep Medicine Clinic today! Your sleep medicine provider today was: Isabel Penaloza DO Below is a summary of your treatment plan, other important information, and our contact numbers:      TREATMENT PLAN     Follow up in 1yr or sooner as needed    Instructions - Common MEGAN Recs: - For your sleep apnea, continue to use your PAP every night and use it whenever you are sleeping.   - Avoid alcohol or sedatives several hours prior to sleeping.   - Get additional supplies for your PAP (e.g., mask, hose, filters) every 3 months or as your insurance allows from your [facility] company. Replacement cushions for your PAP mask can be requested monthly if airseals are an issue.  - Remember to clean your mask, tubings, and water chamber regularly as instructed.  - Avoid driving or operating heavy machinery when drowsy. A person driving while sleepy is five (5) times more likely to have an accident. If you feel sleepy, pull over and take a short power nap (sleep for less than 30 minutes). Otherwise, ask somebody to drive you.        IMPORTANT INFORMATION     Call 911 for medical emergencies.  Our offices are generally open from Monday-Friday, 9 am - 5 pm.  If you need to get in touch with me, you may either call me and my team(number is below) or you can use [employer].  If a referral for a test, for CPAP, or for another specialist was made, and you have not heard about scheduling this within a week, please call scheduling at [phone]).  If you are unable to make your appointment for  clinic or an overnight study, kindly call the office at least 48 hours in advance to cancel and reschedule.  If you are on CPAP, please bring your device's card or the device to each clinic appointment.   There are no supporting services by either the sleep doctors or their staff on weekends and Holidays, or after 5 PM on weekdays.   If you have been asked to come to a sleep study, make sure you bring toiletries, a comfy pillow, and any nighttime medications that you may regularly take. Also be sure to eat dinner before you arrive. We generally do not provide meals.      PRESCRIPTIONS     We require 7 days advanced notice for prescription refills. If we do not receive the request in this time, we cannot guarantee that your medication will be refilled in time.      IMPORTANT PHONE NUMBERS     Sleep Medicine Clinic Fax: [phone]  Appointments (for Pediatric Sleep Clinic): [phone] - option 1  Appointments (for Adult Sleep Clinic): [phone] - option 2  Appointments (For Sleep Studies): [phone] - option 3  Behavioral Sleep Medicine: [phone]  Sleep Surgery: [phone]  ENT (Otolaryngology): [phone]  Headache Clinic (Neurology): [phone]  Neurology: [phone]  Psychiatry: [phone]  Pulmonary Function Testing (PFT) Center: [phone]  Pulmonary Medicine: [phone]  [employer] (DME): [phone]  [employer] (DME): [phone]  [facility] ([facility]): [phone]      OUR ADULT SLEEP MEDICINE TEAM   Please do not hesitate to call the office or sleep nurse with any questions between appointments:    Adult Sleep Nurses (Grace Marroquin, RN and Deepthi Hawkins RN):  For clinical questions and refilling prescriptions: [phone]  Email sleep diaries and other documents at: [email or web address]    Adult Sleep Medicine Secretaries:  Mariposa Awad (For Obi/Brownlee/Tru/Sandrine/Faustina/Manuel):   P: [phone]  F:  [phone]  Amalia Bethea (For [place]): P: [phone]  Fax: [phone]  Meghna Hashouston (For [place]/Blank): P: [phone]  F: [phone]  Maricarmen Gonzalez (For Dmitriy): P: [phone]  F: [phone]  Ngoc Wagner (For Tova/Be/Zakhary): P: [phone]  F: [phone]  Crystal Patel (For Polo/Juice): P: [phone]  F: [phone]     Adult Sleep Medicine Advanced Practice Providers:  Reginaldo Kathleen ([place], [place])  Eula Lr ([facility])  Karma Swift CNP ([place], [place], [place])  Ifeoma Ott CNP ([place], [place], [place])  Pinky Izaguirre ([place], [place], [place])  Randi Bose CNP ([facility])        OUR SLEEP TESTING LOCATIONS     Our team will contact you to schedule your sleep study, however, you can contact us as follow:  Main Phone Line (scheduling only): [phone]), option 3  Adult and Pediatric Locations  [facility] (6 years and older): [facility] - 4th floor ([address] [place] [address]) After hours line: [phone]  [facility] (Main campus: All ages): [facility], 6th floor. After hours line: [phone]   [place] (5 years and older; younger considered on case-by-case basis): [address]; [address], [address]. Scheduling  After hours line: [phone]   [place] (6 years and older): [address]; [address]; [address]   [place] (6 years and older): [address] [facility], [address]  After hours line: [phone]   [place] (13 years and older) in [place]: [address], 2nd floor  After hours line: [phone]   [place] (13 year and older): [address]  After hours line: [phone]     Adult Only Locations:   [place] (18 years and older): [address] [facility], 2nd floor   [place] (18 years and older): [address] [facility]; 4th floor  After hours line: [phone]  [facility]  "(18 years and older) at [place]: [address] [place] [address]  After hours line: [phone]          CONTACTING YOUR SLEEP MEDICINE PROVIDER     Send a message directly to your provider through \"My Chart\", which is the email service through your  Records Account: [email or web address]   Call [phone] and leave a message. One of the administrative assistants will forward the message to your sleep medicine provider through \"My Chart\" and/or email.     Your sleep medicine provider for this visit was: Isabel Penaloza DO        "

## 2024-05-17 NOTE — PROGRESS NOTES
Patient: Aimee Ponce    [id number]  : 1965 -- AGE 58 y.o.    Provider: DO Julio Cesar Meyers [facility]   Service Date: 2024              [facility]  Follow up Visit Note        HISTORY OF PRESENT ILLNESS       Virtual or Telephone Consent  An interactive audio and video telecommunication system which permits real time communications between the patient (at the originating site) and provider (at the distant site) was utilized to provide this telehealth service.     Verbal consent was requested and obtained from Aimee Ponce on this date, 24 for a telehealth visit.     HISTORY OF PRESENT ILLNESS   Aimee Ponce is a 58 y.o. female who presents to a [facility] for a sleep medicine evaluation with concerns of Follow-up (Nasal drainage noticed after she put the mask for about an hour in. Feels the drainage seeing if there is a different mask to use. Taking Claritin for the drainage.  ).     The patient  has a past medical history of Breast cancer (Multi), Cervicalgia (2021), Corns and callosities (2022), Encounter for screening for malignant neoplasm of colon (2020), Hypertension, Ingrowing nail (11/15/2022), Low back pain, unspecified (2020), Meralgia paresthetica, left lower limb (2020), Obesity, unspecified (2022), Otalgia, left ear (2020), Other chronic allergic conjunctivitis (10/06/2021), Other conditions influencing health status, Other conditions influencing health status (02/10/2022), Other instability, right ankle (2021), Other obesity due to excess calories (2022), Other specified soft tissue disorders (2021), Otitis media, unspecified, left ear (2020), Pain in left leg (2021), Pain in unspecified ankle and joints of unspecified foot (2021), Pain in unspecified hip (2022), Personal history of diseases of the  skin and subcutaneous tissue (12/07/2021), Personal history of other diseases of the circulatory system (09/14/2022), Personal history of other diseases of the circulatory system (12/01/2021), Personal history of other diseases of the digestive system (03/25/2022), Personal history of other diseases of the female genital tract, Personal history of other diseases of the respiratory system (02/10/2022), Personal history of other diseases of the respiratory system (08/05/2022), Personal history of other drug therapy (01/18/2022), Personal history of other endocrine, nutritional and metabolic disease (12/01/2021), Personal history of other medical treatment (10/08/2019), Personal history of other specified conditions (11/17/2021), Personal history of other specified conditions (09/14/2022), Personal history of other specified conditions (07/15/2021), Personal history of other specified conditions (03/25/2022), Personal history of other specified conditions (08/31/2022), Plantar fascial fibromatosis, Posterior tibial tendinitis, left leg (03/13/2021), Spontaneous ecchymoses (11/17/2021), and Strain of other muscle(s) and tendon(s) at lower leg level, right leg, initial encounter (06/18/2020)..    PAST SLEEP HISTORY    Patient had a sleep study in 2021 due to hx of A-fib. She was trying to use pap therapy regularly. She has recently struggled with nose bleeds especially that she is on Eliquis; thus she has not been using her pap therapy regularly.     5/17/24  Patient is trying to use pap therapy regularly. She would like to try a different mask.   She has always struggled with rhinitis but the pap machine worsened it. Her nose bleeds have resolved.     Pap benefit: decreased/ eliminated snoring.  Pap issue: mask air leak noted, denies dry mouth, denies skin irritation.     Sleep schedule  on weekdays / work days:  Usual Bedtime: 10:30-11:30pm  Sleep latency: within an hour  Wake time : 5:15am  Total sleep time  average/day:  5-6 hours/day  Awakenings: 2-3x per night, nocturia, short.   Naps: no      Sleep schedule  on weekends/non work days :  Usual Bedtime:   10:30-11:30pm   Sleep latency:  within an hour   Wake time : 6:30-7:30am     Sleep aid: melatonin and calm supplement.   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      Sleep-related ROS:    Snoring:  n  Witnessed apneas:   n     Gasping/ chocking: n        Am Dry mouth:  n            Nasal congestion:   sometimes  , saline spray.   am headaches: mild    Sleep is described as refreshing.     Daytime sleepiness: n  Fatigue or decreased energy: n   Difficulty remembering things in daytime: n  Difficulty staying focused in daytime: n  Irritable during the day: n    Drowsy driving: n  Hx of car accident: n  Near-miss Car accident: n      RLS screen:  RLSSCREEN: - Sensations: Patient does not have unusual sensations in their extremities that cause an urge to move them     Sleep-related behaviors: sometimes talks       Sleep environment:  Bed partner :  boyfriend  Pets in bed :   n  Bedroom temperature: BEDROOM TEMP: cool  Noise :   n  Issues with bed comfort : n      ESS: 1       REVIEW OF SYSTEMS     REVIEW OF SYSTEMS  Review of Systems   All other systems reviewed and are negative.          ALLERGIES AND MEDICATIONS     ALLERGIES  Allergies   Allergen Reactions    Lisinopril Cough       MEDICATIONS  Current Outpatient Medications   Medication Sig Dispense Refill    artificial tears, dextran-hypomel-glycerin, 0.1-0.3-0.2 % ophthalmic solution Administer 1 drop into both eyes if needed for dry eyes.      calcium 500 mg calcium (1,250 mg) tablet Take 1 tablet (1,250 mg) by mouth once daily.      cholecalciferol (Vitamin D-3) 50 mcg (2,000 unit) capsule Take 2 capsules (100 mcg) by mouth early in the morning..      docusate sodium (Colace) 100 mg capsule Take 2 capsules (200 mg) by mouth once daily.      dofetilide (Tikosyn) 500 mcg capsule Take  1 capsule (500 mcg) by mouth 2 times a day. 180 capsule 3    Eliquis 5 mg tablet Take 1 tablet (5 mg) by mouth 2 times a day. 180 tablet 3    hydrALAZINE (Apresoline) 50 mg tablet Take 1 tablet (50 mg) by mouth 2 times a day. 180 tablet 3    ketoconazole (NIZOral) 2 % cream Apply topically 2 times a day. 30 g 11    magnesium oxide (Mag-Ox) 400 mg (241.3 mg magnesium) tablet Take 1 tablet (400 mg) by mouth once daily. 90 tablet 3    melatonin 1 mg tablet,chewable Chew 2 mg once daily at bedtime.      metaxalone (Skelaxin) 800 mg tablet Take 1 tablet (800 mg) by mouth once daily as needed.      metoprolol succinate XL (Toprol-XL) 25 mg 24 hr tablet Take 1 tablet (25 mg) by mouth once daily at bedtime. 90 tablet 3    miconazole (Lotrimin AF) 2 % powder Apply topically 2 times a day. (Patient taking differently: Apply topically if needed.) 85 g 11    mometasone (Elocon) 0.1 % cream Apply to under breast and scaly areas of scalp twice a day as needed      multivitamin with minerals (multivitamin-iron-folic acid) tablet Take 1 tablet by mouth once daily.      ondansetron ODT (Zofran-ODT) 4 mg disintegrating tablet Take 1 tablet (4 mg) by mouth 3 times a day as needed for nausea.      oxymetazoline (Afrin, oxymetazoline,) 0.05 % nasal spray Administer 1 spray into each nostril every 12 hours if needed for congestion for up to 3 doses. Do not use more than 3 doses in any 7-day period. 30 mL 0    rosuvastatin (Crestor) 5 mg tablet Take 1 tablet (5 mg) by mouth once daily. 90 tablet 3    semaglutide (Ozempic) 1 mg/dose (4 mg/3 mL) pen injector Inject 2 mg under the skin 1 (one) time per week.      topiramate (Topamax) 50 mg tablet TAKE 1 TABLET BY MOUTH IN THE MORNING AND BEFORE BEDTIME 90 tablet 0    Ubrelvy 100 mg tablet tablet Take 1 tablet (100 mg) by mouth if needed (migraine). 16 tablet 5    valsartan (Diovan) 320 mg tablet Take 1 tablet (320 mg) by mouth once daily. 90 tablet 3     No current facility-administered  medications for this visit.         PAST HISTORY     PAST MEDICAL HISTORY  She  has a past medical history of Breast cancer (Multi), Cervicalgia (09/21/2021), Corns and callosities (03/25/2022), Encounter for screening for malignant neoplasm of colon (08/17/2020), Hypertension, Ingrowing nail (11/15/2022), Low back pain, unspecified (08/13/2020), Meralgia paresthetica, left lower limb (08/13/2020), Obesity, unspecified (08/31/2022), Otalgia, left ear (04/20/2020), Other chronic allergic conjunctivitis (10/06/2021), Other conditions influencing health status, Other conditions influencing health status (02/10/2022), Other instability, right ankle (04/21/2021), Other obesity due to excess calories (06/14/2022), Other specified soft tissue disorders (05/27/2021), Otitis media, unspecified, left ear (04/20/2020), Pain in left leg (03/13/2021), Pain in unspecified ankle and joints of unspecified foot (04/21/2021), Pain in unspecified hip (09/21/2022), Personal history of diseases of the skin and subcutaneous tissue (12/07/2021), Personal history of other diseases of the circulatory system (09/14/2022), Personal history of other diseases of the circulatory system (12/01/2021), Personal history of other diseases of the digestive system (03/25/2022), Personal history of other diseases of the female genital tract, Personal history of other diseases of the respiratory system (02/10/2022), Personal history of other diseases of the respiratory system (08/05/2022), Personal history of other drug therapy (01/18/2022), Personal history of other endocrine, nutritional and metabolic disease (12/01/2021), Personal history of other medical treatment (10/08/2019), Personal history of other specified conditions (11/17/2021), Personal history of other specified conditions (09/14/2022), Personal history of other specified conditions (07/15/2021), Personal history of other specified conditions (03/25/2022), Personal history of other  specified conditions (08/31/2022), Plantar fascial fibromatosis, Posterior tibial tendinitis, left leg (03/13/2021), Spontaneous ecchymoses (11/17/2021), and Strain of other muscle(s) and tendon(s) at lower leg level, right leg, initial encounter (06/18/2020).      PAST SURGICAL HISTORY:  Past Surgical History:   Procedure Laterality Date    ADENOIDECTOMY  Age 6    APPENDECTOMY  03/14/2016    Appendectomy    BREAST LUMPECTOMY      BUNIONECTOMY  03/14/2016    Simple Bunion Exostectomy ([facility] Procedure)    OTHER SURGICAL HISTORY  11/17/2021    Tonsillectomy with adenoidectomy    OTHER SURGICAL HISTORY  11/17/2021    Bunionectomy    OTHER SURGICAL HISTORY  11/17/2021    Cervical loop electrosurgical excision    OTHER SURGICAL HISTORY  06/22/2022    Lumpectomy       FAMILY HISTORY  Family History   Problem Relation Name Age of Onset    Hypertension Mother C     Stroke Father Wilberto     Hypertension Father Wilberto     Breast cancer Mother's Sister Nelia      DOES/DOES NOT EC: does not have a family history of sleep disorder.      SOCIAL HISTORY  She  reports that she has never smoked. She has never used smokeless tobacco. She reports that she does not currently use alcohol. She reports that she does not use drugs.   Caffeine consumption: n  Alcohol consumption: occasionally   Smoking: n  Marijuana: n  Other drugs: n      PHYSICAL EXAM     VITAL SIGNS: There were no vitals taken for this visit.     PREVIOUS WEIGHTS:  Wt Readings from Last 3 Encounters:   04/27/24 79.4 kg (175 lb)   04/03/24 80.3 kg (177 lb)   01/29/24 80.7 kg (178 lb)       Physical Exam  Constitutional: Alert and oriented, cooperative, no obvious distress.   HENT: normocephalic.   Neurologic: AOx3.   psychiatric: appropriate mood and affect.  Integumentary: no significant rashes observed over pap mask area.       RESULTS/DATA       ASSESSMENT/PLAN     Ms. Ponce is a 58 y.o. female and  has a past medical history of Breast cancer (Multi), Cervicalgia  (09/21/2021), Corns and callosities (03/25/2022), Encounter for screening for malignant neoplasm of colon (08/17/2020), Hypertension, Ingrowing nail (11/15/2022), Low back pain, unspecified (08/13/2020), Meralgia paresthetica, left lower limb (08/13/2020), Obesity, unspecified (08/31/2022), Otalgia, left ear (04/20/2020), Other chronic allergic conjunctivitis (10/06/2021), Other conditions influencing health status, Other conditions influencing health status (02/10/2022), Other instability, right ankle (04/21/2021), Other obesity due to excess calories (06/14/2022), Other specified soft tissue disorders (05/27/2021), Otitis media, unspecified, left ear (04/20/2020), Pain in left leg (03/13/2021), Pain in unspecified ankle and joints of unspecified foot (04/21/2021), Pain in unspecified hip (09/21/2022), Personal history of diseases of the skin and subcutaneous tissue (12/07/2021), Personal history of other diseases of the circulatory system (09/14/2022), Personal history of other diseases of the circulatory system (12/01/2021), Personal history of other diseases of the digestive system (03/25/2022), Personal history of other diseases of the female genital tract, Personal history of other diseases of the respiratory system (02/10/2022), Personal history of other diseases of the respiratory system (08/05/2022), Personal history of other drug therapy (01/18/2022), Personal history of other endocrine, nutritional and metabolic disease (12/01/2021), Personal history of other medical treatment (10/08/2019), Personal history of other specified conditions (11/17/2021), Personal history of other specified conditions (09/14/2022), Personal history of other specified conditions (07/15/2021), Personal history of other specified conditions (03/25/2022), Personal history of other specified conditions (08/31/2022), Plantar fascial fibromatosis, Posterior tibial tendinitis, left leg (03/13/2021), Spontaneous ecchymoses  (11/17/2021), and Strain of other muscle(s) and tendon(s) at lower leg level, right leg, initial encounter (06/18/2020). She was referred to the [facility] for evaluation of sleep     Problem List Items Addressed This Visit    None        Problem List and Orders  Pmhx includes A-fib, HTN, migraines.     1- MEGAN   HST 8/19/21 --> mild MEGAN, AHI 3% 8; weight 200  Titration 10/7/21 --> consider CPAP 12cwp; weight 91kg.     Reviewed and discussed the above sleep study results and management options in details. All questions answered, patient verbalizes understanding.     -previously: adjusted settings from 5-20 cwp, rAHI 0.4, but very little usage--> To Autopap 4-12cwp; as patient is concerned that the higher settings may trigger nose bleed; will try the 4-12cwp and adjust based on downloads an symptoms.     -cont. Autopap 4-12cwp, rAHI 1, median pressure 5.2, max avg 9.8; some days with a large leak, poor compliance, nose bleeds resolved.   -ordered mask refitting  -Please work with your DME to find a mask that fits you well, and controls your leak.  -ordered supplies.  -counseled on the importance of compliance.     -do not drive or operate heavy machinery if drowsy.  -avoid sleeping on your back.   -avoid sedating substances/ medication, alcohol, illicit drugs and tobacco.      2- Obesity  counseled on eating a healthy diet and exercising as tolerated.    Her weight goal is around 166lb; she will let us know when she is ready for another sleep study.     Follow up in 12 months or sooner as needed.

## 2024-05-24 DIAGNOSIS — I10 ESSENTIAL HYPERTENSION: ICD-10-CM

## 2024-05-24 RX ORDER — HYDRALAZINE HYDROCHLORIDE 50 MG/1
50 TABLET, FILM COATED ORAL 3 TIMES DAILY
Qty: 270 TABLET | Refills: 0 | Status: SHIPPED | OUTPATIENT
Start: 2024-05-24

## 2024-05-30 ENCOUNTER — OFFICE VISIT (OUTPATIENT)
Dept: NEUROLOGY | Facility: CLINIC | Age: 59
End: 2024-05-30
Payer: COMMERCIAL

## 2024-05-30 VITALS
HEART RATE: 62 BPM | WEIGHT: 175 LBS | BODY MASS INDEX: 29.16 KG/M2 | DIASTOLIC BLOOD PRESSURE: 95 MMHG | SYSTOLIC BLOOD PRESSURE: 148 MMHG | HEIGHT: 65 IN

## 2024-05-30 DIAGNOSIS — G43.009 MIGRAINE WITHOUT AURA AND WITHOUT STATUS MIGRAINOSUS, NOT INTRACTABLE: Primary | ICD-10-CM

## 2024-05-30 DIAGNOSIS — E66.09 CLASS 1 OBESITY DUE TO EXCESS CALORIES WITH SERIOUS COMORBIDITY AND BODY MASS INDEX (BMI) OF 34.0 TO 34.9 IN ADULT: ICD-10-CM

## 2024-05-30 PROCEDURE — 3080F DIAST BP >= 90 MM HG: CPT | Performed by: NURSE PRACTITIONER

## 2024-05-30 PROCEDURE — 99214 OFFICE O/P EST MOD 30 MIN: CPT | Performed by: NURSE PRACTITIONER

## 2024-05-30 PROCEDURE — 3077F SYST BP >= 140 MM HG: CPT | Performed by: NURSE PRACTITIONER

## 2024-05-30 PROCEDURE — 1036F TOBACCO NON-USER: CPT | Performed by: NURSE PRACTITIONER

## 2024-05-30 PROCEDURE — 3008F BODY MASS INDEX DOCD: CPT | Performed by: NURSE PRACTITIONER

## 2024-05-30 RX ORDER — PREDNISONE 20 MG/1
TABLET ORAL
COMMUNITY
Start: 2024-04-28

## 2024-05-30 RX ORDER — ONDANSETRON 4 MG/1
4 TABLET, ORALLY DISINTEGRATING ORAL 3 TIMES DAILY PRN
Qty: 20 TABLET | Refills: 3 | Status: SHIPPED | OUTPATIENT
Start: 2024-05-30

## 2024-05-30 ASSESSMENT — ANXIETY QUESTIONNAIRES
2. NOT BEING ABLE TO STOP OR CONTROL WORRYING: NOT AT ALL
5. BEING SO RESTLESS THAT IT IS HARD TO SIT STILL: NOT AT ALL
7. FEELING AFRAID AS IF SOMETHING AWFUL MIGHT HAPPEN: NOT AT ALL
3. WORRYING TOO MUCH ABOUT DIFFERENT THINGS: NOT AT ALL
IF YOU CHECKED OFF ANY PROBLEMS ON THIS QUESTIONNAIRE, HOW DIFFICULT HAVE THESE PROBLEMS MADE IT FOR YOU TO DO YOUR WORK, TAKE CARE OF THINGS AT HOME, OR GET ALONG WITH OTHER PEOPLE: NOT DIFFICULT AT ALL
GAD7 TOTAL SCORE: 0
1. FEELING NERVOUS, ANXIOUS, OR ON EDGE: NOT AT ALL
6. BECOMING EASILY ANNOYED OR IRRITABLE: NOT AT ALL
4. TROUBLE RELAXING: NOT AT ALL

## 2024-05-30 ASSESSMENT — PATIENT HEALTH QUESTIONNAIRE - PHQ9
2. FEELING DOWN, DEPRESSED OR HOPELESS: NOT AT ALL
1. LITTLE INTEREST OR PLEASURE IN DOING THINGS: NOT AT ALL
SUM OF ALL RESPONSES TO PHQ9 QUESTIONS 1 & 2: 0

## 2024-05-30 ASSESSMENT — ENCOUNTER SYMPTOMS
DEPRESSION: 0
OCCASIONAL FEELINGS OF UNSTEADINESS: 0
LOSS OF SENSATION IN FEET: 0

## 2024-05-30 ASSESSMENT — PAIN SCALES - GENERAL: PAINLEVEL: 0-NO PAIN

## 2024-05-30 NOTE — PROGRESS NOTES
Patient being assessed today for follow-up of migraines.  She reports that she is experiencing 2-3 migraines per month.  It is accompanied with nausea, photophobia.  She utilizes the Zofran as needed for the nausea which is typically effective with 1 dose.  She does utilize the Ubrelvy which is typically effective with 1 dose.  Denies any side effects.  Would like to continue the Ubrelvy as she has been taking it.  Discussed role of medicine,  importance of taking medications, potential risks, benefits, and precautions to be taken.  Reviewed sleep hygiene and dietary modifications.  Follow-up in 6 months.    This note was created with voice recognition software and was not corrected for typographical or grammatical errors

## 2024-05-31 DIAGNOSIS — E66.09 CLASS 1 OBESITY DUE TO EXCESS CALORIES WITH SERIOUS COMORBIDITY AND BODY MASS INDEX (BMI) OF 34.0 TO 34.9 IN ADULT: ICD-10-CM

## 2024-05-31 RX ORDER — TOPIRAMATE 50 MG/1
50 TABLET, FILM COATED ORAL 2 TIMES DAILY
Qty: 90 TABLET | Refills: 1 | Status: SHIPPED | OUTPATIENT
Start: 2024-05-31 | End: 2024-05-31 | Stop reason: SDUPTHER

## 2024-05-31 RX ORDER — TOPIRAMATE 50 MG/1
50 TABLET, FILM COATED ORAL 2 TIMES DAILY
Qty: 90 TABLET | Refills: 1 | Status: SHIPPED | OUTPATIENT
Start: 2024-05-31

## 2024-07-10 ENCOUNTER — OFFICE VISIT (OUTPATIENT)
Dept: SURGERY | Facility: HOSPITAL | Age: 59
End: 2024-07-10
Payer: COMMERCIAL

## 2024-07-10 VITALS
SYSTOLIC BLOOD PRESSURE: 128 MMHG | HEART RATE: 64 BPM | DIASTOLIC BLOOD PRESSURE: 84 MMHG | WEIGHT: 177 LBS | HEIGHT: 65 IN | BODY MASS INDEX: 29.49 KG/M2

## 2024-07-10 DIAGNOSIS — C50.911 MALIGNANT NEOPLASM OF RIGHT BREAST IN FEMALE, ESTROGEN RECEPTOR NEGATIVE, UNSPECIFIED SITE OF BREAST (MULTI): Primary | ICD-10-CM

## 2024-07-10 DIAGNOSIS — Z17.1 MALIGNANT NEOPLASM OF RIGHT BREAST IN FEMALE, ESTROGEN RECEPTOR NEGATIVE, UNSPECIFIED SITE OF BREAST (MULTI): Primary | ICD-10-CM

## 2024-07-10 PROCEDURE — 99213 OFFICE O/P EST LOW 20 MIN: CPT | Performed by: SURGERY

## 2024-07-10 PROCEDURE — 1036F TOBACCO NON-USER: CPT | Performed by: SURGERY

## 2024-07-10 PROCEDURE — 3074F SYST BP LT 130 MM HG: CPT | Performed by: SURGERY

## 2024-07-10 PROCEDURE — 3008F BODY MASS INDEX DOCD: CPT | Performed by: SURGERY

## 2024-07-10 PROCEDURE — 3079F DIAST BP 80-89 MM HG: CPT | Performed by: SURGERY

## 2024-07-10 ASSESSMENT — ENCOUNTER SYMPTOMS
HEMATOLOGIC/LYMPHATIC NEGATIVE: 1
ALLERGIC/IMMUNOLOGIC NEGATIVE: 1
CONSTITUTIONAL NEGATIVE: 1
EYES NEGATIVE: 1
PSYCHIATRIC NEGATIVE: 1
CARDIOVASCULAR NEGATIVE: 1
NEUROLOGICAL NEGATIVE: 1
MUSCULOSKELETAL NEGATIVE: 1
ENDOCRINE NEGATIVE: 1
GASTROINTESTINAL NEGATIVE: 1
RESPIRATORY NEGATIVE: 1

## 2024-07-10 NOTE — PROGRESS NOTES
Subjective   Patient ID: Aimee Ponce is a 58 y.o. female who presents for Follow-up.  HPI    No new complaints     Interval imagin2022: Bilateral diagnostic mammogram normal category 2 findings.   23: Bilateral diagnostic mammogram normal category 2 findings.     2022: partial mastectomy right breast, final pathology DCIS with clear margins. ER/PRnegative. No complaints.  [id number]     Completed adjuvant radiation 2022     Review of Systems   Constitutional: Negative.    HENT: Negative.     Eyes: Negative.    Respiratory: Negative.     Cardiovascular: Negative.    Gastrointestinal: Negative.    Endocrine: Negative.    Genitourinary: Negative.    Musculoskeletal: Negative.    Skin: Negative.    Allergic/Immunologic: Negative.    Neurological: Negative.    Hematological: Negative.    Psychiatric/Behavioral: Negative.     Past Medical History  Problems    · History of Acute right-sided low back pain without sciatica (724.2) (M54.50)   · Resolved Date: 23 Oct 2020   · History of Ankle pain (719.47) (M25.579)   · Resolved Date: 21 Sep 2022   · History of Back sprain (847.9)   · History of Class 1 obesity due to excess calories with serious comorbidity and body  mass index (BMI) of 32.0 to 32.9 in adult (278.00,V85.32) (E66.09,Z68.32)   · Resolved Date: 14 Sep 2022   · History of Class 1 obesity with body mass index (BMI) of 32.0 to 32.9 in adult  (278.00,V85.32) (E66.9,Z68.32)   · Resolved Date: 14 Sep 2022   · History of Colon cancer screening (V76.51) (Z12.11)   · Resolved Date: 21 Sep 2022   · History of Corn of foot (700) (L84)   · Resolved Date: 2022   · History of Hip pain, acute (719.45) (M25.559)   · Resolved Date: 14 Dec 2022   · History of abdominal pain (V13.89) (Z87.898)   · Resolved Date: 21 Sep 2022   · History of abnormal cervical Papanicolaou smear (V13.29) (Z87.42)   · History of atrial fibrillation (V12.59) (Z86.79)   · Resolved Date: 2022   · History of constipation  (V12.79) (Z87.19)   · Resolved Date: 14 Jun 2022   · History of cough   · Resolved Date: 14 Jun 2022   · History of dermatitis (V13.3) (Z87.2)   · Resolved Date: 14 Jun 2022   · History of edema (V13.89) (Z87.898)   · Resolved Date: 14 Jun 2022   · History of fatigue (V13.89) (Z87.898)   · Resolved Date: 14 Jun 2022   · History of obesity (V12.29) (Z86.39)   · Resolved Date: 14 Jun 2022   · History of palpitations (V12.59) (Z87.898)   · Resolved Date: 14 Dec 2022   · History of paranasal sinus congestion (V12.69) (Z87.09)   · Resolved Date: 21 Sep 2022   · History of pneumococcal vaccination (V49.89) (Z92.29)   · Resolved Date: 21 Sep 2022   · History of postnasal drip (V13.89) (Z87.898)   · Resolved Date: 21 Sep 2022   · History of screening mammography (V15.89) (Z92.89)   · Resolved Date: 21 Sep 2022   · History of sinoatrial node dysfunction (V12.59) (Z86.79)   · Resolved Date: 14 Dec 2022   · History of sinusitis (V12.69) (Z87.09)   · Resolved Date: 14 Jun 2022   · History of Infection of left ear (382.9) (H66.92)   · Resolved Date: 23 Oct 2020   · History of Left ear pain (388.70) (H92.02)   · Resolved Date: 23 Oct 2020   · History of Meralgia paresthetica of left side (355.1) (G57.12)   · Resolved Date: 23 Oct 2020   · History of Neck pain, chronic (723.1,338.29) (M54.2,G89.29)   · Resolved Date: 21 Sep 2022   · History of Other chronic allergic conjunctivitis of both eyes (372.14) (H10.45)   · Resolved Date: 14 Jun 2022   · History of Other instability, right ankle (718.87) (M25.371)   · Resolved Date: 14 Jun 2022   · History of Pain of left lower extremity (729.5) (M79.605)   · Resolved Date: 14 Jun 2022   · History of Petechiae (782.7) (R23.3)   · Resolved Date: 14 Jun 2022   · History of Plantar fasciitis (728.71) (M72.2)   · History of Strain of right calf muscle (844.8) (S86.811A)   · Resolved Date: 23 Oct 2020   · History of Swelling of left foot (729.81) (M79.89)   · Resolved Date: 14 Jun 2022   ·  History of Tibialis tendinitis of left lower extremity (726.72) (M76.822)   · Resolved Date: 14 Jun 2022     Surgical History  Problems    · History of Appendectomy   · History of Bunionectomy   · History of Carpal tunnel surgery   · 12/2022   · History of Cervical loop electrosurgical excision   · History of Lumpectomy   · History of Simple Bunion Exostectomy (Silver Procedure)   · History of Tonsillectomy with adenoidectomy    Family History   Problem Relation Name Age of Onset    Hypertension Mother ELIUD     Stroke Father Wilberto     Hypertension Father Wilberto     Breast cancer Mother's Sister Nelia       Social History     Socioeconomic History    Marital status:    Tobacco Use    Smoking status: Never    Smokeless tobacco: Never   Vaping Use    Vaping status: Never Used   Substance and Sexual Activity    Alcohol use: Not Currently     Comment: once or twice a month    Drug use: Never    Sexual activity: Not Currently     Birth control/protection: None          Current Outpatient Medications:     artificial tears, dextran-hypomel-glycerin, 0.1-0.3-0.2 % ophthalmic solution, Administer 1 drop into both eyes if needed for dry eyes., Disp: , Rfl:     calcium 500 mg calcium (1,250 mg) tablet, Take 1 tablet (1,250 mg) by mouth once daily., Disp: , Rfl:     cholecalciferol (Vitamin D-3) 50 mcg (2,000 unit) capsule, Take 2 capsules (100 mcg) by mouth early in the morning.., Disp: , Rfl:     docusate sodium (Colace) 100 mg capsule, Take 2 capsules (200 mg) by mouth once daily., Disp: , Rfl:     dofetilide (Tikosyn) 500 mcg capsule, Take 1 capsule (500 mcg) by mouth 2 times a day., Disp: 180 capsule, Rfl: 3    Eliquis 5 mg tablet, Take 1 tablet (5 mg) by mouth 2 times a day., Disp: 180 tablet, Rfl: 3    hydrALAZINE (Apresoline) 50 mg tablet, TAKE 1 TABLET BY MOUTH THREE TIMES A DAY, Disp: 270 tablet, Rfl: 0    magnesium oxide (Mag-Ox) 400 mg (241.3 mg magnesium) tablet, Take 1 tablet (400 mg) by mouth once daily.,  Disp: 90 tablet, Rfl: 3    melatonin 1 mg tablet,chewable, Chew 2 mg once daily at bedtime., Disp: , Rfl:     metaxalone (Skelaxin) 800 mg tablet, Take 1 tablet (800 mg) by mouth once daily as needed., Disp: , Rfl:     metoprolol succinate XL (Toprol-XL) 25 mg 24 hr tablet, Take 1 tablet (25 mg) by mouth once daily at bedtime., Disp: 90 tablet, Rfl: 3    miconazole (Lotrimin AF) 2 % powder, Apply topically 2 times a day. (Patient taking differently: Apply topically if needed.), Disp: 85 g, Rfl: 11    mometasone (Elocon) 0.1 % cream, Apply to under breast and scaly areas of scalp twice a day as needed, Disp: , Rfl:     multivitamin with minerals (multivitamin-iron-folic acid) tablet, Take 1 tablet by mouth once daily., Disp: , Rfl:     ondansetron ODT (Zofran-ODT) 4 mg disintegrating tablet, Take 1 tablet (4 mg) by mouth 3 times a day as needed for nausea., Disp: 20 tablet, Rfl: 3    oxymetazoline (Afrin, oxymetazoline,) 0.05 % nasal spray, Administer 1 spray into each nostril every 12 hours if needed for congestion for up to 3 doses. Do not use more than 3 doses in any 7-day period., Disp: 30 mL, Rfl: 0    predniSONE (Deltasone) 20 mg tablet, TAKE 1 TABLET BY MOUTH ONCE DAILY IN THE MORNING WITH FOOD FOR 3 DAYS, Disp: , Rfl:     rosuvastatin (Crestor) 5 mg tablet, Take 1 tablet (5 mg) by mouth once daily., Disp: 90 tablet, Rfl: 3    semaglutide (Ozempic) 1 mg/dose (4 mg/3 mL) pen injector, Inject 2 mg under the skin 1 (one) time per week., Disp: , Rfl:     topiramate (Topamax) 50 mg tablet, Take 1 tablet (50 mg) by mouth 2 times a day., Disp: 90 tablet, Rfl: 1    Ubrelvy 100 mg tablet tablet, Take 1 tablet (100 mg) by mouth if needed (migraine)., Disp: 16 tablet, Rfl: 5    valsartan (Diovan) 320 mg tablet, Take 1 tablet (320 mg) by mouth once daily., Disp: 90 tablet, Rfl: 3     Objective   Vitals:    07/10/24 0915   BP: 128/84   Pulse: 64      Physical Exam  Constitutional - General appearance: In no acute distress,  well appearing and well nourished.   Neck - Exam: Appearance of the neck was normal. No neck masses and no adenopathy observed.   Pulmonary - Respiratory effort: Normal respiration.   Chest - Breasts: Normal, no dimpling or skin changes appreciated. Right breast incision well-healed. Mild skin pigmentation c/w radiation change.  Assessment/Plan   Problem List Items Addressed This Visit    None  Visit Diagnoses         Codes    Malignant neoplasm of right breast in female, estrogen receptor negative, unspecified site of breast (Multi)    -  Primary C50.911, Z17.1            Overall doing well from my standpoint.  6-month follow-up follow-up, bilateral diag MMG, anticipate transitioning to annual follow-up with mammogram at that time.  Chrissy Benavides MD

## 2024-07-16 DIAGNOSIS — C50.911 MALIGNANT NEOPLASM OF RIGHT BREAST IN FEMALE, ESTROGEN RECEPTOR NEGATIVE, UNSPECIFIED SITE OF BREAST (MULTI): Primary | ICD-10-CM

## 2024-07-16 DIAGNOSIS — Z17.1 MALIGNANT NEOPLASM OF RIGHT BREAST IN FEMALE, ESTROGEN RECEPTOR NEGATIVE, UNSPECIFIED SITE OF BREAST (MULTI): Primary | ICD-10-CM

## 2024-07-29 ENCOUNTER — APPOINTMENT (OUTPATIENT)
Dept: OBSTETRICS AND GYNECOLOGY | Facility: CLINIC | Age: 59
End: 2024-07-29
Payer: COMMERCIAL

## 2024-07-29 VITALS — DIASTOLIC BLOOD PRESSURE: 80 MMHG | BODY MASS INDEX: 28.59 KG/M2 | SYSTOLIC BLOOD PRESSURE: 118 MMHG | WEIGHT: 171.8 LBS

## 2024-07-29 DIAGNOSIS — Z00.00 ANNUAL PHYSICAL EXAM: ICD-10-CM

## 2024-07-29 PROCEDURE — 87624 HPV HI-RISK TYP POOLED RSLT: CPT

## 2024-07-29 PROCEDURE — 3079F DIAST BP 80-89 MM HG: CPT | Performed by: OBSTETRICS & GYNECOLOGY

## 2024-07-29 PROCEDURE — 1036F TOBACCO NON-USER: CPT | Performed by: OBSTETRICS & GYNECOLOGY

## 2024-07-29 PROCEDURE — 3074F SYST BP LT 130 MM HG: CPT | Performed by: OBSTETRICS & GYNECOLOGY

## 2024-07-29 PROCEDURE — 99396 PREV VISIT EST AGE 40-64: CPT | Performed by: OBSTETRICS & GYNECOLOGY

## 2024-07-29 ASSESSMENT — PATIENT HEALTH QUESTIONNAIRE - PHQ9
1. LITTLE INTEREST OR PLEASURE IN DOING THINGS: NOT AT ALL
2. FEELING DOWN, DEPRESSED OR HOPELESS: NOT AT ALL
SUM OF ALL RESPONSES TO PHQ9 QUESTIONS 1 & 2: 0

## 2024-07-29 NOTE — PROGRESS NOTES
Well woman care visit      CC:  Annual GYN examination.      HPI:  Patient answers are not available for this visit.  HPI       Annual Exam     Additional comments: Est pt   Chaperone barbie             Comments    Pap 2023 wnl  Sees dr vásquez for brown  Having hot flashes at night not enough to be concerned with          Last edited by Barbie Mcgarry MA on 7/29/2024  8:29 AM.          Breast cancer screening discussed     Discussed fertility status and plans    Discussed cervical cancer screening guidelines.    No urinary issues.  No urgency frequency or incontinence    No bowel issues.  Soft daily bowel movement.    No issues with pain discharge bleeding.      Reviewed pap history:    2016 Negative cyto only  2023 negative + other type HPV    Reports procedure 2020? No path available    ROS:  GEN - no fevers or chills  RESP - no SOB or cough  GI - no blood in BMs  URO - no hematuria  GYN - no AUB or vaginal discharge  PSYCH - mood OK      Past Medical History:   Diagnosis Date    Abnormal Pap smear of cervix     Breast cancer (Multi)     right breast cancer 5/22    Cervicalgia 09/21/2021    Neck pain, chronic    Corns and callosities 03/25/2022    Corn of foot    Encounter for screening for malignant neoplasm of colon 08/17/2020    Colon cancer screening    HPV (human papilloma virus) infection     Hypertension     Ingrowing nail 11/15/2022    Ingrown toenail    Low back pain, unspecified 08/13/2020    Acute right-sided low back pain without sciatica    Meralgia paresthetica, left lower limb 08/13/2020    Meralgia paresthetica of left side    Migraine     Obesity, unspecified 08/31/2022    Class 1 obesity with body mass index (BMI) of 32.0 to 32.9 in adult    Otalgia, left ear 04/20/2020    Left ear pain    Other chronic allergic conjunctivitis 10/06/2021    Other chronic allergic conjunctivitis of both eyes    Other conditions influencing health status     Back sprain    Other conditions influencing health status  02/10/2022    History of cough    Other instability, right ankle 04/21/2021    Other instability, right ankle    Other obesity due to excess calories 06/14/2022    Class 1 obesity due to excess calories with serious comorbidity and body mass index (BMI) of 32.0 to 32.9 in adult    Other specified soft tissue disorders 05/27/2021    Swelling of left foot    Otitis media, unspecified, left ear 04/20/2020    Infection of left ear    Pain in left leg 03/13/2021    Pain of left lower extremity    Pain in unspecified ankle and joints of unspecified foot 04/21/2021    Ankle pain    Pain in unspecified hip 09/21/2022    Hip pain, acute    Personal history of diseases of the skin and subcutaneous tissue 12/07/2021    History of dermatitis    Personal history of other diseases of the circulatory system 09/14/2022    History of sinoatrial node dysfunction    Personal history of other diseases of the circulatory system 12/01/2021    History of atrial fibrillation    Personal history of other diseases of the digestive system 03/25/2022    History of constipation    Personal history of other diseases of the female genital tract     History of abnormal cervical Papanicolaou smear    Personal history of other diseases of the respiratory system 02/10/2022    History of sinusitis    Personal history of other diseases of the respiratory system 08/05/2022    History of paranasal sinus congestion    Personal history of other drug therapy 01/18/2022    History of pneumococcal vaccination    Personal history of other endocrine, nutritional and metabolic disease 12/01/2021    History of obesity    Personal history of other medical treatment 10/08/2019    History of screening mammography    Personal history of other specified conditions 11/17/2021    History of edema    Personal history of other specified conditions 09/14/2022    History of palpitations    Personal history of other specified conditions 07/15/2021    History of fatigue     Personal history of other specified conditions 03/25/2022    History of abdominal pain    Personal history of other specified conditions 08/31/2022    History of postnasal drip    Plantar fascial fibromatosis     Plantar fasciitis    Posterior tibial tendinitis, left leg 03/13/2021    Tibialis tendinitis of left lower extremity    Spontaneous ecchymoses 11/17/2021    Petechiae    Strain of other muscle(s) and tendon(s) at lower leg level, right leg, initial encounter 06/18/2020    Strain of right calf muscle     Past Surgical History:   Procedure Laterality Date    ADENOIDECTOMY  Age 6    APPENDECTOMY  03/14/2016    Appendectomy    BREAST BIOPSY      BREAST LUMPECTOMY      BUNIONECTOMY  03/14/2016    Simple Bunion Exostectomy (Silver Procedure)    CERVICAL BIOPSY  W/ LOOP ELECTRODE EXCISION      COLPOSCOPY      OTHER SURGICAL HISTORY  11/17/2021    Tonsillectomy with adenoidectomy    OTHER SURGICAL HISTORY  11/17/2021    Bunionectomy    OTHER SURGICAL HISTORY  11/17/2021    Cervical loop electrosurgical excision    OTHER SURGICAL HISTORY  06/22/2022    Lumpectomy     Social History     Socioeconomic History    Marital status:      Spouse name: Not on file    Number of children: Not on file    Years of education: Not on file    Highest education level: Not on file   Occupational History    Not on file   Tobacco Use    Smoking status: Never    Smokeless tobacco: Never   Vaping Use    Vaping status: Never Used   Substance and Sexual Activity    Alcohol use: Not Currently     Comment: once or twice a month    Drug use: Never    Sexual activity: Not Currently     Birth control/protection: None   Other Topics Concern    Not on file   Social History Narrative    Not on file     Social Determinants of Health     Financial Resource Strain: Not on file   Food Insecurity: Not on file   Transportation Needs: Not on file   Physical Activity: Not on file   Stress: Not on file   Social Connections: Not on file   Intimate  Partner Violence: Not on file   Housing Stability: Not on file     Cancer-related family history includes Breast cancer in her mother's sister.       PHYSICAL EXAM:  /80   Wt 77.9 kg (171 lb 12.8 oz)   BMI 28.59 kg/m²   Physical examination:  General: No distress  Neck: No masses  Respiratory: No respiratory distress  Abdomen: soft nontender no hernias  GYN: Normal vulvar skin normal clitoral hernandez and clitoris labia majora and minora normal vaginal mucosa no lesions cervix normal uterine body not enlarged no enlargement or pain in bilateral adnexa   perianal area: without lesions  pap    IMPRESSION/PLAN:  58 y.o. routine care HPV positive, h/o dcis  Return 1 year      Carrillo Driver MD

## 2024-07-31 ENCOUNTER — APPOINTMENT (OUTPATIENT)
Dept: CARDIOLOGY | Facility: CLINIC | Age: 59
End: 2024-07-31
Payer: COMMERCIAL

## 2024-07-31 VITALS
WEIGHT: 172 LBS | BODY MASS INDEX: 25.48 KG/M2 | SYSTOLIC BLOOD PRESSURE: 112 MMHG | HEIGHT: 69 IN | DIASTOLIC BLOOD PRESSURE: 84 MMHG | HEART RATE: 62 BPM

## 2024-07-31 DIAGNOSIS — R94.31 ABNORMAL EKG: Primary | ICD-10-CM

## 2024-07-31 DIAGNOSIS — Z51.81 ANTICOAGULATION MANAGEMENT ENCOUNTER: ICD-10-CM

## 2024-07-31 DIAGNOSIS — Z78.9 NEVER SMOKED CIGARETTES: ICD-10-CM

## 2024-07-31 DIAGNOSIS — I49.5 SINUS NODE DYSFUNCTION (MULTI): ICD-10-CM

## 2024-07-31 DIAGNOSIS — Z79.01 ANTICOAGULATION MANAGEMENT ENCOUNTER: ICD-10-CM

## 2024-07-31 DIAGNOSIS — I48.0 PAROXYSMAL ATRIAL FIBRILLATION WITH RVR (MULTI): ICD-10-CM

## 2024-07-31 PROCEDURE — 3074F SYST BP LT 130 MM HG: CPT | Performed by: INTERNAL MEDICINE

## 2024-07-31 PROCEDURE — 3008F BODY MASS INDEX DOCD: CPT | Performed by: INTERNAL MEDICINE

## 2024-07-31 PROCEDURE — 3079F DIAST BP 80-89 MM HG: CPT | Performed by: INTERNAL MEDICINE

## 2024-07-31 PROCEDURE — 99214 OFFICE O/P EST MOD 30 MIN: CPT | Performed by: INTERNAL MEDICINE

## 2024-07-31 PROCEDURE — 93000 ELECTROCARDIOGRAM COMPLETE: CPT | Performed by: INTERNAL MEDICINE

## 2024-07-31 PROCEDURE — 1036F TOBACCO NON-USER: CPT | Performed by: INTERNAL MEDICINE

## 2024-07-31 RX ORDER — LORATADINE 10 MG/1
10 TABLET ORAL DAILY
COMMUNITY

## 2024-07-31 ASSESSMENT — ENCOUNTER SYMPTOMS
PALPITATIONS: 1
DYSPNEA ON EXERTION: 0

## 2024-07-31 NOTE — PROGRESS NOTES
CARDIOLOGY OFFICE VISIT      CHIEF COMPLAINT  Chief Complaint   Patient presents with    Follow-up     6m       HISTORY OF PRESENT ILLNESS  HPI  58-year-old  female who is followed for paroxysmal atrial fibrillation controlled on dofetilide, metoprolol, magnesium oxide, and anticoagulated with Eliquis. She was seen in the office by Dr. Ferrer on December 29, 2023 for follow-up of atrial fibrillation with rapid ventricular response. She had had an episode of epistaxis on December 21, 2023 which required nasal packing. Patient states since that time she has had a total of 4 episodes of epistaxis.  Patient states that so far she has been doing well.  She has not had any more recent episodes of epistaxis.  Occasionally palpitations but no significant findings.    EKG performed today shows sinus rhythm right bundle branch block at a rate of 62 bpm QRS ration 124 ms QT corrected 450 ms.  Rhythm strip shows the same pattern.      Past Medical History  Past Medical History:   Diagnosis Date    Abnormal Pap smear of cervix     Breast cancer (Multi)     right breast cancer 5/22    Cervicalgia 09/21/2021    Neck pain, chronic    Corns and callosities 03/25/2022    Corn of foot    Encounter for screening for malignant neoplasm of colon 08/17/2020    Colon cancer screening    HPV (human papilloma virus) infection     Hypertension     Ingrowing nail 11/15/2022    Ingrown toenail    Low back pain, unspecified 08/13/2020    Acute right-sided low back pain without sciatica    Meralgia paresthetica, left lower limb 08/13/2020    Meralgia paresthetica of left side    Migraine     Obesity, unspecified 08/31/2022    Class 1 obesity with body mass index (BMI) of 32.0 to 32.9 in adult    Otalgia, left ear 04/20/2020    Left ear pain    Other chronic allergic conjunctivitis 10/06/2021    Other chronic allergic conjunctivitis of both eyes    Other conditions influencing health status     Back sprain    Other conditions influencing  health status 02/10/2022    History of cough    Other instability, right ankle 04/21/2021    Other instability, right ankle    Other obesity due to excess calories 06/14/2022    Class 1 obesity due to excess calories with serious comorbidity and body mass index (BMI) of 32.0 to 32.9 in adult    Other specified soft tissue disorders 05/27/2021    Swelling of left foot    Otitis media, unspecified, left ear 04/20/2020    Infection of left ear    Pain in left leg 03/13/2021    Pain of left lower extremity    Pain in unspecified ankle and joints of unspecified foot 04/21/2021    Ankle pain    Pain in unspecified hip 09/21/2022    Hip pain, acute    Personal history of diseases of the skin and subcutaneous tissue 12/07/2021    History of dermatitis    Personal history of other diseases of the circulatory system 09/14/2022    History of sinoatrial node dysfunction    Personal history of other diseases of the circulatory system 12/01/2021    History of atrial fibrillation    Personal history of other diseases of the digestive system 03/25/2022    History of constipation    Personal history of other diseases of the female genital tract     History of abnormal cervical Papanicolaou smear    Personal history of other diseases of the respiratory system 02/10/2022    History of sinusitis    Personal history of other diseases of the respiratory system 08/05/2022    History of paranasal sinus congestion    Personal history of other drug therapy 01/18/2022    History of pneumococcal vaccination    Personal history of other endocrine, nutritional and metabolic disease 12/01/2021    History of obesity    Personal history of other medical treatment 10/08/2019    History of screening mammography    Personal history of other specified conditions 11/17/2021    History of edema    Personal history of other specified conditions 09/14/2022    History of palpitations    Personal history of other specified conditions 07/15/2021    History of  fatigue    Personal history of other specified conditions 03/25/2022    History of abdominal pain    Personal history of other specified conditions 08/31/2022    History of postnasal drip    Plantar fascial fibromatosis     Plantar fasciitis    Posterior tibial tendinitis, left leg 03/13/2021    Tibialis tendinitis of left lower extremity    Spontaneous ecchymoses 11/17/2021    Petechiae    Strain of other muscle(s) and tendon(s) at lower leg level, right leg, initial encounter 06/18/2020    Strain of right calf muscle       Social History  Social History     Tobacco Use    Smoking status: Never    Smokeless tobacco: Never   Vaping Use    Vaping status: Never Used   Substance Use Topics    Alcohol use: Yes     Comment: once or twice a month    Drug use: Never       Family History     Family History   Problem Relation Name Age of Onset    Hypertension Mother ELIUD     Stroke Father Wilberto     Hypertension Father Wilberto     Atrial fibrillation Father Wilberto     Heart disease Father Wilberto     Breast cancer Mother's Sister Nelia         Allergies:  Allergies   Allergen Reactions    Lisinopril Cough        Outpatient Medications:  Current Outpatient Medications   Medication Instructions    artificial tears, dextran-hypomel-glycerin, 0.1-0.3-0.2 % ophthalmic solution 1 drop, Both Eyes, As needed    calcium 500 mg calcium (1,250 mg) tablet 1 tablet, oral, Daily    cholecalciferol (Vitamin D-3) 50 mcg (2,000 unit) capsule 2 capsules, oral, Daily    docusate sodium (Colace) 100 mg capsule 2 capsules, oral, Daily    dofetilide (TIKOSYN) 500 mcg, oral, 2 times daily    Eliquis 5 mg, oral, 2 times daily    hydrALAZINE (APRESOLINE) 50 mg, oral, 3 times daily    loratadine (CLARITIN) 10 mg, oral, Daily    magnesium oxide (MAG-OX) 400 mg, oral, Daily    melatonin 2 mg, oral, Nightly    metaxalone (SKELAXIN) 800 mg, oral, Daily PRN    metoprolol succinate XL (TOPROL-XL) 25 mg, oral, Nightly    miconazole (Lotrimin AF) 2 % powder  Topical, 2 times daily    mometasone (Elocon) 0.1 % cream Apply to under breast and scaly areas of scalp twice a day as needed    multivitamin with minerals (multivitamin-iron-folic acid) tablet 1 tablet, oral, Daily    ondansetron ODT (ZOFRAN-ODT) 4 mg, oral, 3 times daily PRN    oxymetazoline (Afrin, oxymetazoline,) 0.05 % nasal spray 1 spray, Each Nostril, Every 12 hours PRN, Do not use more than 3 doses in any 7-day period.    Ozempic 2 mg, subcutaneous, Once Weekly    rosuvastatin (CRESTOR) 5 mg, oral, Daily    topiramate (TOPAMAX) 50 mg, oral, 2 times daily    Ubrelvy 100 mg, oral, As needed    valsartan (DIOVAN) 320 mg, oral, Daily          REVIEW OF SYSTEMS  Review of Systems   Cardiovascular:  Positive for palpitations. Negative for chest pain and dyspnea on exertion.   All other systems reviewed and are negative.        VITALS  Vitals:    07/31/24 0932   BP: 112/84   Pulse: 62       PHYSICAL EXAM  Constitutional:       Appearance: Normal and healthy appearance. Well-developed and not in distress.   Neck:      Vascular: No JVR. JVD normal.   Pulmonary:      Effort: Pulmonary effort is normal.      Breath sounds: Normal breath sounds. No wheezing. No rhonchi. No rales.   Chest:      Chest wall: Not tender to palpatation.   Cardiovascular:      PMI at left midclavicular line. Normal rate. Regular rhythm. Normal S1. Normal S2.       Murmurs: There is no murmur.      No gallop.  No click. No rub.   Pulses:     Intact distal pulses.   Edema:     Peripheral edema absent.   Abdominal:      Tenderness: There is no abdominal tenderness.   Musculoskeletal: Normal range of motion.         General: No tenderness. Skin:     General: Skin is warm and dry.   Neurological:      General: No focal deficit present.      Mental Status: Alert and oriented to person, place and time.           ASSESSMENT AND PLAN  Clinical impressions:  1. Paroxysmal atrial fibrillation with increased burden on flecainide, controlled on  dofetilide, metoprolol, magnesium oxide, and anticoagulated with Eliquis.  2. Hypertension, controlled.  3. Partial right mastectomy and radiation therapy with surgical margins being clear and currently on surveillance screening.  4. Normal coronaries per left heart catheterization dated September 27, 2019.  5. Normal left ventricular function per RAIZA dated September 27, 2019.  6. Sinus node dysfunction.  7. Right bundle branch block per twelve-lead EKG.  8. Class I obesity with a BMI 29.62.  9.  Recurrent epistaxis on Eliquis.    Plan recommendations    Patient is doing well from the electrophysiology standpoint.  Continue with current medical therapy that includes Eliquis and dofetilide (high risk medication).    Continue with beta-blocker therapy.    Follow my office every 6 months or sooner if needed.    If patient had recurrence of epistaxis, we can consider Watchman device implantation.    Risk factor modification and lifestyle modification discussed with patient. Diet , exercise and hydration discussed with patient.    I have personally review with patient during this office visit, laboratory data, echocardiogram results, stress test results, Holter-event monitor results prior and after the last electrophysiology visit. All questions has been answered.    Please excuse any errors in grammar or translation related to this dictation.  Voice recognition software was utilized to prepare this document.

## 2024-08-08 ENCOUNTER — APPOINTMENT (OUTPATIENT)
Dept: PODIATRY | Facility: CLINIC | Age: 59
End: 2024-08-08
Payer: COMMERCIAL

## 2024-08-08 DIAGNOSIS — M72.2 PLANTAR FASCIITIS: Primary | ICD-10-CM

## 2024-08-08 DIAGNOSIS — I89.0 LYMPHEDEMA: ICD-10-CM

## 2024-08-08 PROCEDURE — 99213 OFFICE O/P EST LOW 20 MIN: CPT | Performed by: PODIATRIST

## 2024-08-08 NOTE — PROGRESS NOTES
History Of Present Illness  Aimee Ponce is a 58 y.o. female presenting with chief complaint of: patient complains of b/l foot pain with right being worse. Patient requesting new orthotics.  Patient does relate a history of lymphedema    PCP Lang Wu DO  Last visit 4/3/24     Past Medical History  She has a past medical history of Abnormal Pap smear of cervix, Atrial fibrillation (Multi), Breast cancer (Multi), Cervicalgia (09/21/2021), Corns and callosities (03/25/2022), Encounter for screening for malignant neoplasm of colon (08/17/2020), HPV (human papilloma virus) infection, Hypertension, Ingrowing nail (11/15/2022), Low back pain, unspecified (08/13/2020), Meralgia paresthetica, left lower limb (08/13/2020), Migraine, Obesity, unspecified (08/31/2022), Otalgia, left ear (04/20/2020), Other chronic allergic conjunctivitis (10/06/2021), Other conditions influencing health status, Other conditions influencing health status (02/10/2022), Other instability, right ankle (04/21/2021), Other obesity due to excess calories (06/14/2022), Other specified soft tissue disorders (05/27/2021), Otitis media, unspecified, left ear (04/20/2020), Pain in left leg (03/13/2021), Pain in unspecified ankle and joints of unspecified foot (04/21/2021), Pain in unspecified hip (09/21/2022), Personal history of diseases of the skin and subcutaneous tissue (12/07/2021), Personal history of other diseases of the circulatory system (09/14/2022), Personal history of other diseases of the circulatory system (12/01/2021), Personal history of other diseases of the digestive system (03/25/2022), Personal history of other diseases of the female genital tract, Personal history of other diseases of the respiratory system (02/10/2022), Personal history of other diseases of the respiratory system (08/05/2022), Personal history of other drug therapy (01/18/2022), Personal history of other endocrine, nutritional and metabolic disease  (12/01/2021), Personal history of other medical treatment (10/08/2019), Personal history of other specified conditions (11/17/2021), Personal history of other specified conditions (09/14/2022), Personal history of other specified conditions (07/15/2021), Personal history of other specified conditions (03/25/2022), Personal history of other specified conditions (08/31/2022), Plantar fascial fibromatosis, Posterior tibial tendinitis, left leg (03/13/2021), Sleep apnea (2021), Spontaneous ecchymoses (11/17/2021), and Strain of other muscle(s) and tendon(s) at lower leg level, right leg, initial encounter (06/18/2020).    She has no past medical history of antineoplastic chemo.    Surgical History  She has a past surgical history that includes Appendectomy (03/14/2016); Bunionectomy (03/14/2016); Other surgical history (11/17/2021); Other surgical history (11/17/2021); Other surgical history (11/17/2021); Other surgical history (06/22/2022); Breast lumpectomy; Adenoidectomy (Age 6); Colposcopy; Breast biopsy; and Cervical biopsy w/ loop electrode excision.     Social History  She reports that she has never smoked. She has never used smokeless tobacco. She reports that she does not currently use alcohol. She reports that she does not use drugs.    Family History  Family History   Problem Relation Name Age of Onset    Hypertension Mother C     Stroke Father Wilberto     Hypertension Father Wilberto     Atrial fibrillation Father Wilberto     Heart disease Father Wilberto     Breast cancer Mother's Sister Nelia         Allergies  Lisinopril    Medications  Current Outpatient Medications   Medication Sig Dispense Refill    artificial tears, dextran-hypomel-glycerin, 0.1-0.3-0.2 % ophthalmic solution Administer 1 drop into both eyes if needed for dry eyes.      calcium 500 mg calcium (1,250 mg) tablet Take 1 tablet (1,250 mg) by mouth once daily.      cholecalciferol (Vitamin D-3) 50 mcg (2,000 unit) capsule Take 2 capsules (100 mcg)  by mouth early in the morning..      docusate sodium (Colace) 100 mg capsule Take 2 capsules (200 mg) by mouth once daily.      dofetilide (Tikosyn) 500 mcg capsule Take 1 capsule (500 mcg) by mouth 2 times a day. 180 capsule 3    Eliquis 5 mg tablet Take 1 tablet (5 mg) by mouth 2 times a day. 180 tablet 3    hydrALAZINE (Apresoline) 50 mg tablet TAKE 1 TABLET BY MOUTH THREE TIMES A DAY (Patient taking differently: Take 1 tablet (50 mg) by mouth 2 times a day.) 270 tablet 0    loratadine (Claritin) 10 mg tablet Take 1 tablet (10 mg) by mouth once daily.      magnesium oxide (Mag-Ox) 400 mg (241.3 mg magnesium) tablet Take 1 tablet (400 mg) by mouth once daily. 90 tablet 3    melatonin 1 mg tablet,chewable Chew 2 mg once daily at bedtime.      metaxalone (Skelaxin) 800 mg tablet Take 1 tablet (800 mg) by mouth once daily as needed.      metoprolol succinate XL (Toprol-XL) 25 mg 24 hr tablet Take 1 tablet (25 mg) by mouth once daily at bedtime. 90 tablet 3    miconazole (Lotrimin AF) 2 % powder Apply topically 2 times a day. (Patient taking differently: Apply topically if needed.) 85 g 11    mometasone (Elocon) 0.1 % cream Apply to under breast and scaly areas of scalp twice a day as needed      multivitamin with minerals (multivitamin-iron-folic acid) tablet Take 1 tablet by mouth once daily.      ondansetron ODT (Zofran-ODT) 4 mg disintegrating tablet Take 1 tablet (4 mg) by mouth 3 times a day as needed for nausea. 20 tablet 3    oxymetazoline (Afrin, oxymetazoline,) 0.05 % nasal spray Administer 1 spray into each nostril every 12 hours if needed for congestion for up to 3 doses. Do not use more than 3 doses in any 7-day period. 30 mL 0    rosuvastatin (Crestor) 5 mg tablet Take 1 tablet (5 mg) by mouth once daily. 90 tablet 3    semaglutide (Ozempic) 1 mg/dose (4 mg/3 mL) pen injector Inject 2 mg under the skin 1 (one) time per week.      topiramate (Topamax) 50 mg tablet Take 1 tablet (50 mg) by mouth 2 times a  day. 90 tablet 1    Ubrelvy 100 mg tablet tablet Take 1 tablet (100 mg) by mouth if needed (migraine). 16 tablet 5    valsartan (Diovan) 320 mg tablet Take 1 tablet (320 mg) by mouth once daily. 90 tablet 3     No current facility-administered medications for this visit.       Review of Systems    REVIEW OF SYSTEMS  GENERAL:  Negative for malaise, significant weight loss, fever  CARDIOVASCULAR: leg swelling   MUSCULOSKELETAL:  Negative for joint pain or swelling, back pain, and muscle pain.  SKIN:  Negative for lesions, rash, and itching  PSYCH:  Negative for sleep disturbance, mood disorder and recent psychosocial stressors  NEURO: Negative, denies any burning, tingling or numbness     Objective:   Vasc: DP and PT pulses are palpable bilateral.  CFT is less than 3 seconds bilateral.  Skin temperature is warm to cool proximal to distal bilateral.  Patient does have bilateral lymph    Neuro:  Light touch is intact to the foot bilateral.   There is no clonus noted.  The hallux is downgoing bilateral.      Derm: Nails are normal. Skin is supple with normal texture and turgor noted.  Webspaces are clean, dry and intact bilateral.  There are no hyperkeratoses, ulcerations, verruca or other lesions noted.      Ortho: Muscle strength is 5/5 for all pedal groups tested.  Ankle joint, subtalar joint, 1st MPJ and lesser MPJ ROM is full and without pain or crepitus.  The foot type is rectus bilateral off weight bearing.  Patient has mild pes cavus.  She has tenderness along her right plantar medial longitudinal arch with weightbearing.  Plantar fascia is somewhat taut.  Assessment/Plan     Diagnoses and all orders for this visit:  Plantar fasciitis  Lymphedema      Feel the patient would benefit from a new pair of custom fit orthotics.  Currently Jered is wearing Superfeet which have been unsuccessful in alleviating her discomfort.  Patient can follow-up with me as needed.

## 2024-08-12 ENCOUNTER — SPECIALTY PHARMACY (OUTPATIENT)
Dept: PHARMACY | Facility: CLINIC | Age: 59
End: 2024-08-12

## 2024-08-12 PROCEDURE — RXMED WILLOW AMBULATORY MEDICATION CHARGE

## 2024-08-13 ENCOUNTER — PHARMACY VISIT (OUTPATIENT)
Dept: PHARMACY | Facility: CLINIC | Age: 59
End: 2024-08-13
Payer: COMMERCIAL

## 2024-08-22 ENCOUNTER — TELEPHONE (OUTPATIENT)
Dept: CARDIOLOGY | Facility: CLINIC | Age: 59
End: 2024-08-22
Payer: COMMERCIAL

## 2024-08-22 NOTE — TELEPHONE ENCOUNTER
[facility] paperwork filled out and faxed to [facility]. Will scan to chart.

## 2024-08-27 DIAGNOSIS — E66.09 CLASS 1 OBESITY DUE TO EXCESS CALORIES WITH SERIOUS COMORBIDITY AND BODY MASS INDEX (BMI) OF 34.0 TO 34.9 IN ADULT: ICD-10-CM

## 2024-08-27 RX ORDER — TOPIRAMATE 50 MG/1
50 TABLET, FILM COATED ORAL 2 TIMES DAILY
Qty: 180 TABLET | Refills: 0 | Status: SHIPPED | OUTPATIENT
Start: 2024-08-27

## 2024-09-12 ENCOUNTER — TELEPHONE (OUTPATIENT)
Dept: PRIMARY CARE | Facility: CLINIC | Age: 59
End: 2024-09-12
Payer: COMMERCIAL

## 2024-09-12 NOTE — TELEPHONE ENCOUNTER
Pt states she recently lost 35-40 lbs and had a dizzy spell last night after she took her hydralazine. She says she got up this morning and her bp read 133/88. She does say she checked her bp last week which ran in the 150s/80s-90s. She says she is just taking the diovan for now because she didn't want a dizzy spell again. She's asking what she should do when it comes to taking her medication, please advise

## 2024-09-15 ENCOUNTER — SPECIALTY PHARMACY (OUTPATIENT)
Dept: PHARMACY | Facility: CLINIC | Age: 59
End: 2024-09-15

## 2024-09-15 PROCEDURE — RXMED WILLOW AMBULATORY MEDICATION CHARGE

## 2024-09-19 ENCOUNTER — PHARMACY VISIT (OUTPATIENT)
Dept: PHARMACY | Facility: CLINIC | Age: 59
End: 2024-09-19
Payer: COMMERCIAL

## 2024-09-23 ENCOUNTER — APPOINTMENT (OUTPATIENT)
Dept: OBSTETRICS AND GYNECOLOGY | Facility: CLINIC | Age: 59
End: 2024-09-23
Payer: COMMERCIAL

## 2024-09-24 ENCOUNTER — TELEPHONE (OUTPATIENT)
Dept: PRIMARY CARE | Facility: CLINIC | Age: 59
End: 2024-09-24
Payer: COMMERCIAL

## 2024-09-24 NOTE — TELEPHONE ENCOUNTER
Patient states she had small blood vessel pop in her right eye not effecting her vision she is on Eliquis and she thinks this has happened before.  She has upcoming apt on 10/03/24.  Should she just keep an eye on it or does she need to be seen sooner.

## 2024-09-25 ENCOUNTER — LAB (OUTPATIENT)
Dept: LAB | Facility: LAB | Age: 59
End: 2024-09-25
Payer: COMMERCIAL

## 2024-09-25 DIAGNOSIS — R53.83 FATIGUE, UNSPECIFIED TYPE: ICD-10-CM

## 2024-09-25 DIAGNOSIS — Z11.59 ENCOUNTER FOR HEPATITIS C SCREENING TEST FOR LOW RISK PATIENT: ICD-10-CM

## 2024-09-25 DIAGNOSIS — I10 ESSENTIAL HYPERTENSION: ICD-10-CM

## 2024-09-25 DIAGNOSIS — Z11.4 SCREENING FOR HUMAN IMMUNODEFICIENCY VIRUS WITHOUT PRESENCE OF RISK FACTORS: ICD-10-CM

## 2024-09-25 DIAGNOSIS — E78.2 MIXED HYPERLIPIDEMIA: ICD-10-CM

## 2024-09-25 LAB
25(OH)D3 SERPL-MCNC: 42 NG/ML (ref 30–100)
ALBUMIN SERPL BCP-MCNC: 4.5 G/DL (ref 3.4–5)
ALP SERPL-CCNC: 65 U/L (ref 33–110)
ALT SERPL W P-5'-P-CCNC: 19 U/L (ref 7–45)
ANION GAP SERPL CALC-SCNC: 10 MMOL/L (ref 10–20)
AST SERPL W P-5'-P-CCNC: 18 U/L (ref 9–39)
BASOPHILS # BLD AUTO: 0.03 X10*3/UL (ref 0–0.1)
BASOPHILS NFR BLD AUTO: 0.7 %
BILIRUB SERPL-MCNC: 0.6 MG/DL (ref 0–1.2)
BUN SERPL-MCNC: 19 MG/DL (ref 6–23)
CALCIUM SERPL-MCNC: 9.9 MG/DL (ref 8.6–10.3)
CHLORIDE SERPL-SCNC: 108 MMOL/L (ref 98–107)
CHOLEST SERPL-MCNC: 181 MG/DL (ref 0–199)
CHOLESTEROL/HDL RATIO: 2.9
CO2 SERPL-SCNC: 26 MMOL/L (ref 21–32)
CREAT SERPL-MCNC: 0.89 MG/DL (ref 0.5–1.05)
CREAT UR-MCNC: 31.3 MG/DL (ref 20–320)
EGFRCR SERPLBLD CKD-EPI 2021: 76 ML/MIN/1.73M*2
EOSINOPHIL # BLD AUTO: 0.09 X10*3/UL (ref 0–0.7)
EOSINOPHIL NFR BLD AUTO: 2.1 %
ERYTHROCYTE [DISTWIDTH] IN BLOOD BY AUTOMATED COUNT: 12 % (ref 11.5–14.5)
GLUCOSE SERPL-MCNC: 92 MG/DL (ref 74–99)
HCT VFR BLD AUTO: 45.1 % (ref 36–46)
HCV AB SER QL: NONREACTIVE
HDLC SERPL-MCNC: 62.4 MG/DL
HGB BLD-MCNC: 14.8 G/DL (ref 12–16)
HIV 1+2 AB+HIV1 P24 AG SERPL QL IA: NONREACTIVE
IMM GRANULOCYTES # BLD AUTO: 0.02 X10*3/UL (ref 0–0.7)
IMM GRANULOCYTES NFR BLD AUTO: 0.5 % (ref 0–0.9)
LDLC SERPL CALC-MCNC: 102 MG/DL
LYMPHOCYTES # BLD AUTO: 1.25 X10*3/UL (ref 1.2–4.8)
LYMPHOCYTES NFR BLD AUTO: 28.9 %
MAGNESIUM SERPL-MCNC: 2.27 MG/DL (ref 1.6–2.4)
MCH RBC QN AUTO: 31.5 PG (ref 26–34)
MCHC RBC AUTO-ENTMCNC: 32.8 G/DL (ref 32–36)
MCV RBC AUTO: 96 FL (ref 80–100)
MICROALBUMIN UR-MCNC: <7 MG/L
MICROALBUMIN/CREAT UR: NORMAL MG/G{CREAT}
MONOCYTES # BLD AUTO: 0.54 X10*3/UL (ref 0.1–1)
MONOCYTES NFR BLD AUTO: 12.5 %
NEUTROPHILS # BLD AUTO: 2.4 X10*3/UL (ref 1.2–7.7)
NEUTROPHILS NFR BLD AUTO: 55.3 %
NON HDL CHOLESTEROL: 119 MG/DL (ref 0–149)
NRBC BLD-RTO: 0 /100 WBCS (ref 0–0)
PLATELET # BLD AUTO: 257 X10*3/UL (ref 150–450)
POTASSIUM SERPL-SCNC: 4.1 MMOL/L (ref 3.5–5.3)
PROT SERPL-MCNC: 6.9 G/DL (ref 6.4–8.2)
RBC # BLD AUTO: 4.7 X10*6/UL (ref 4–5.2)
SODIUM SERPL-SCNC: 140 MMOL/L (ref 136–145)
TRIGL SERPL-MCNC: 82 MG/DL (ref 0–149)
TSH SERPL-ACNC: 1 MIU/L (ref 0.44–3.98)
VLDL: 16 MG/DL (ref 0–40)
WBC # BLD AUTO: 4.3 X10*3/UL (ref 4.4–11.3)

## 2024-09-25 PROCEDURE — 80061 LIPID PANEL: CPT

## 2024-09-25 PROCEDURE — 80053 COMPREHEN METABOLIC PANEL: CPT

## 2024-09-25 PROCEDURE — 87389 HIV-1 AG W/HIV-1&-2 AB AG IA: CPT

## 2024-09-25 PROCEDURE — 86803 HEPATITIS C AB TEST: CPT

## 2024-09-25 PROCEDURE — 84443 ASSAY THYROID STIM HORMONE: CPT

## 2024-09-25 PROCEDURE — 83735 ASSAY OF MAGNESIUM: CPT

## 2024-09-25 PROCEDURE — 36415 COLL VENOUS BLD VENIPUNCTURE: CPT

## 2024-09-25 PROCEDURE — 82043 UR ALBUMIN QUANTITATIVE: CPT

## 2024-09-25 PROCEDURE — 82306 VITAMIN D 25 HYDROXY: CPT

## 2024-09-25 PROCEDURE — 85025 COMPLETE CBC W/AUTO DIFF WBC: CPT

## 2024-09-25 PROCEDURE — 82570 ASSAY OF URINE CREATININE: CPT

## 2024-10-03 ENCOUNTER — APPOINTMENT (OUTPATIENT)
Dept: PRIMARY CARE | Facility: CLINIC | Age: 59
End: 2024-10-03
Payer: COMMERCIAL

## 2024-10-03 VITALS
HEART RATE: 60 BPM | BODY MASS INDEX: 24.51 KG/M2 | WEIGHT: 166 LBS | SYSTOLIC BLOOD PRESSURE: 130 MMHG | TEMPERATURE: 97.2 F | OXYGEN SATURATION: 95 % | RESPIRATION RATE: 16 BRPM | DIASTOLIC BLOOD PRESSURE: 85 MMHG

## 2024-10-03 DIAGNOSIS — E78.2 MIXED HYPERLIPIDEMIA: ICD-10-CM

## 2024-10-03 DIAGNOSIS — I10 ESSENTIAL HYPERTENSION: Primary | ICD-10-CM

## 2024-10-03 PROBLEM — L85.3 XEROSIS OF SKIN: Status: RESOLVED | Noted: 2023-03-15 | Resolved: 2024-10-03

## 2024-10-03 PROBLEM — M54.12 LEFT CERVICAL RADICULOPATHY: Status: RESOLVED | Noted: 2023-03-15 | Resolved: 2024-10-03

## 2024-10-03 PROBLEM — R09.82 PND (POST-NASAL DRIP): Status: RESOLVED | Noted: 2023-03-15 | Resolved: 2024-10-03

## 2024-10-03 PROBLEM — M54.9 BACK PAIN: Status: RESOLVED | Noted: 2023-03-15 | Resolved: 2024-10-03

## 2024-10-03 PROBLEM — Z78.9 NEVER SMOKED CIGARETTES: Status: RESOLVED | Noted: 2024-07-31 | Resolved: 2024-10-03

## 2024-10-03 PROBLEM — L60.0 INGROWN TOENAIL: Status: RESOLVED | Noted: 2023-03-15 | Resolved: 2024-10-03

## 2024-10-03 PROCEDURE — 3079F DIAST BP 80-89 MM HG: CPT | Performed by: FAMILY MEDICINE

## 2024-10-03 PROCEDURE — 1036F TOBACCO NON-USER: CPT | Performed by: FAMILY MEDICINE

## 2024-10-03 PROCEDURE — 3075F SYST BP GE 130 - 139MM HG: CPT | Performed by: FAMILY MEDICINE

## 2024-10-03 PROCEDURE — 99214 OFFICE O/P EST MOD 30 MIN: CPT | Performed by: FAMILY MEDICINE

## 2024-10-03 ASSESSMENT — ENCOUNTER SYMPTOMS
EYE PAIN: 0
DIARRHEA: 0
RHINORRHEA: 0
EYE ITCHING: 0
HEADACHES: 0
NUMBNESS: 0
DIAPHORESIS: 0
WOUND: 0
FATIGUE: 1
CHILLS: 0
BACK PAIN: 0
HALLUCINATIONS: 0
FREQUENCY: 0
NAUSEA: 0
PALPITATIONS: 0
ABDOMINAL DISTENTION: 0
DIZZINESS: 0
DYSPHORIC MOOD: 0
SINUS PRESSURE: 0
POLYDIPSIA: 0
UNEXPECTED WEIGHT CHANGE: 0
ACTIVITY CHANGE: 0
TROUBLE SWALLOWING: 0
FACIAL ASYMMETRY: 0
SLEEP DISTURBANCE: 0
VOICE CHANGE: 0
SORE THROAT: 0
ADENOPATHY: 0
ABDOMINAL PAIN: 0
CHOKING: 0
EYE REDNESS: 0
SHORTNESS OF BREATH: 0
NERVOUS/ANXIOUS: 0
CONSTIPATION: 0
JOINT SWELLING: 0
VOMITING: 0
WEAKNESS: 0
FLANK PAIN: 0
CHEST TIGHTNESS: 0
HEMATURIA: 0
LIGHT-HEADEDNESS: 0
HYPERTENSION: 1
DYSURIA: 0
CONFUSION: 0
BLOOD IN STOOL: 0
SPEECH DIFFICULTY: 0
NECK PAIN: 0
BRUISES/BLEEDS EASILY: 0
PHOTOPHOBIA: 0
WHEEZING: 0
APPETITE CHANGE: 0
COUGH: 0
ARTHRALGIAS: 0
NECK STIFFNESS: 0
MYALGIAS: 0
EYE DISCHARGE: 0
FEVER: 0
SEIZURES: 0
TREMORS: 0
AGITATION: 0

## 2024-10-03 NOTE — PROGRESS NOTES
Subjective   Patient ID: Aimee Ponce is a 58 y.o. female who presents for 6 month check up (And review labs ).  Hypertension  Pertinent negatives include no chest pain, headaches, neck pain, palpitations or shortness of breath.   Hyperlipidemia  Pertinent negatives include no chest pain, myalgias or shortness of breath.       Review of Systems   Constitutional:  Positive for fatigue. Negative for activity change, appetite change, chills, diaphoresis, fever and unexpected weight change.   HENT:  Negative for congestion, ear pain, hearing loss, nosebleeds, postnasal drip, rhinorrhea, sinus pressure, sneezing, sore throat, tinnitus, trouble swallowing and voice change.    Eyes:  Negative for photophobia, pain, discharge, redness, itching and visual disturbance.   Respiratory:  Negative for cough, choking, chest tightness, shortness of breath and wheezing.    Cardiovascular:  Negative for chest pain, palpitations and leg swelling.   Gastrointestinal:  Negative for abdominal distention, abdominal pain, blood in stool, constipation, diarrhea, nausea and vomiting.   Endocrine: Negative for cold intolerance, heat intolerance, polydipsia and polyuria.   Genitourinary:  Negative for dysuria, flank pain, frequency, hematuria and urgency.   Musculoskeletal:  Negative for arthralgias, back pain, joint swelling, myalgias, neck pain and neck stiffness.   Skin:  Positive for rash. Negative for wound.   Allergic/Immunologic: Negative for immunocompromised state.   Neurological:  Negative for dizziness, tremors, seizures, syncope, facial asymmetry, speech difficulty, weakness, light-headedness, numbness and headaches.   Hematological:  Negative for adenopathy. Does not bruise/bleed easily.   Psychiatric/Behavioral:  Negative for agitation, behavioral problems, confusion, dysphoric mood, hallucinations, self-injury, sleep disturbance and suicidal ideas. The patient is not nervous/anxious.        Objective   /85 (BP Location:  Right arm, Patient Position: Sitting, BP Cuff Size: Large adult)   Pulse 60   Temp 36.2 °C (97.2 °F) (Temporal)   Resp 16   Wt 75.3 kg (166 lb)   SpO2 95%   BMI 24.51 kg/m²   Physical Exam  Constitutional:       General: She is not in acute distress.     Appearance: She is obese. She is not ill-appearing or diaphoretic.   HENT:      Head: Normocephalic and atraumatic.      Right Ear: External ear normal.      Left Ear: External ear normal.      Nose: Nose normal. No rhinorrhea.   Eyes:      General: Lids are normal. No scleral icterus.        Right eye: No discharge.         Left eye: No discharge.      Conjunctiva/sclera: Conjunctivae normal.   Cardiovascular:      Rate and Rhythm: Normal rate and regular rhythm.      Pulses: Normal pulses.      Heart sounds: No murmur heard.  Pulmonary:      Effort: Pulmonary effort is normal. No respiratory distress.      Breath sounds: No decreased breath sounds, wheezing, rhonchi or rales.   Abdominal:      General: Bowel sounds are normal. There is no distension.      Palpations: Abdomen is soft. There is no mass.      Tenderness: There is no abdominal tenderness. There is no guarding or rebound.   Musculoskeletal:         General: No swelling, tenderness or deformity.      Cervical back: No rigidity or tenderness.      Right lower leg: No edema.      Left lower leg: No edema.   Lymphadenopathy:      Cervical: No cervical adenopathy.      Upper Body:      Right upper body: No supraclavicular adenopathy.      Left upper body: No supraclavicular adenopathy.   Skin:     General: Skin is warm and dry.      Coloration: Skin is not jaundiced or pale.      Findings: No erythema or lesion.   Neurological:      General: No focal deficit present.      Mental Status: She is alert and oriented to person, place, and time.      Sensory: No sensory deficit.      Motor: No weakness or tremor.      Coordination: Coordination normal.      Gait: Gait normal.   Psychiatric:         Mood and  Affect: Mood normal. Affect is not inappropriate.         Behavior: Behavior normal.         Assessment/Plan   Problem List Items Addressed This Visit       Essential hypertension - Primary    Relevant Orders    Follow Up In [facility] - PCP - Health Maintenance    CBC and Auto Differential    Comprehensive Metabolic Panel    Lipid Panel    Magnesium    TSH with reflex to Free T4 if abnormal    Hyperlipidemia    Relevant Orders    Follow Up In [facility] - PCP - Health Maintenance    CBC and Auto Differential    Comprehensive Metabolic Panel    Lipid Panel    Magnesium    TSH with reflex to Free T4 if abnormal      As far as prior lung nodule patient has been previously evaluated by lung nodule clinic.  They recommended repeat CT in 18 to 24 months that would be approximately 6/2025 at the latest.  Reviewed timing with patient.  If patient does not hear from lung nodule clinic by April or May we will order repeat CT and reorder lung nodule clinic follow-up.

## 2024-10-06 NOTE — PATIENT INSTRUCTIONS
Continue same medications/treatment.  Patient educated on proper medication use.  Patient educated on risk factor modification.  Please bring any lab results from other providers/physicians to your next appointment.    Please bring all medicines, vitamins, and herbal supplements with you when you come to the office.    Prescriptions will not be filled unless you are compliant with your follow up appointments or have a follow up appointment scheduled as per instruction of your physician. Refills should be requested at the time of your visit.     Follow up with Fabi in 6 months      ITaina LPN, AM SCRIBING FOR, AND IN THE PRESENCE OF DR. SYDNEY BARILLAS MD   
Yes

## 2024-11-18 ENCOUNTER — APPOINTMENT (OUTPATIENT)
Dept: NEUROLOGY | Facility: CLINIC | Age: 59
End: 2024-11-18
Payer: COMMERCIAL

## 2024-11-18 DIAGNOSIS — G43.009 MIGRAINE WITHOUT AURA AND WITHOUT STATUS MIGRAINOSUS, NOT INTRACTABLE: Primary | ICD-10-CM

## 2024-11-18 PROCEDURE — 99214 OFFICE O/P EST MOD 30 MIN: CPT | Performed by: NURSE PRACTITIONER

## 2024-11-18 PROCEDURE — 99214 OFFICE O/P EST MOD 30 MIN: CPT | Mod: 95 | Performed by: NURSE PRACTITIONER

## 2024-11-18 RX ORDER — UBROGEPANT 100 MG/1
100 TABLET ORAL AS NEEDED
Qty: 16 TABLET | Refills: 5 | Status: SHIPPED | OUTPATIENT
Start: 2024-11-18

## 2024-11-18 NOTE — PROGRESS NOTES
Subjective   HPI  Aimee Ponce is a 59 y.o. year old female who presents with chief complaint Migraine without aura and without status migrainosus, not intractable [G43.009]      Aimee is having 2-3 HA days per month, 2-3 of the HAs meet migraine criteria.   The current rescue medications work within 1-2    hours and decreased the headache by 100%. The patient typically does not repeat treatment with rescue medication.  Reports some increased activity due to increased stress.    Current/ past HA treatments:  Preventive:  Topiramate  Metoprolol    Rescue:  Triptans contraindicated related to diagnosis of A-fib  Ubrelvy-typically effective with 1 dose      Current Outpatient Medications:     artificial tears, dextran-hypomel-glycerin, 0.1-0.3-0.2 % ophthalmic solution, Administer 1 drop into both eyes if needed for dry eyes., Disp: , Rfl:     calcium carbonate 600 mg calcium (1,500 mg) tablet, Take 1 tablet (1,500 mg) by mouth once daily., Disp: , Rfl:     cholecalciferol (Vitamin D-3) 50 mcg (2,000 unit) capsule, Take 2 capsules (100 mcg) by mouth early in the morning.., Disp: , Rfl:     docusate sodium (Colace) 100 mg capsule, Take 2 capsules (200 mg) by mouth once daily., Disp: , Rfl:     dofetilide (Tikosyn) 500 mcg capsule, Take 1 capsule (500 mcg) by mouth 2 times a day., Disp: 180 capsule, Rfl: 3    Eliquis 5 mg tablet, Take 1 tablet (5 mg) by mouth 2 times a day., Disp: 180 tablet, Rfl: 3    hydrALAZINE (Apresoline) 50 mg tablet, TAKE 1 TABLET BY MOUTH THREE TIMES A DAY (Patient taking differently: Take 1 tablet (50 mg) by mouth once daily.), Disp: 270 tablet, Rfl: 0    loratadine (Claritin) 10 mg tablet, Take 1 tablet (10 mg) by mouth once daily., Disp: , Rfl:     magnesium oxide (Mag-Ox) 400 mg (241.3 mg magnesium) tablet, Take 1 tablet (400 mg) by mouth once daily., Disp: 90 tablet, Rfl: 3    melatonin 1 mg tablet,chewable, Chew 2 mg once daily at bedtime., Disp: , Rfl:     metaxalone (Skelaxin) 800 mg  tablet, Take 1 tablet (800 mg) by mouth once daily as needed., Disp: , Rfl:     metoprolol succinate XL (Toprol-XL) 25 mg 24 hr tablet, Take 1 tablet (25 mg) by mouth once daily at bedtime., Disp: 90 tablet, Rfl: 3    miconazole (Lotrimin AF) 2 % powder, Apply topically 2 times a day. (Patient taking differently: Apply topically if needed.), Disp: 85 g, Rfl: 11    mometasone (Elocon) 0.1 % cream, Apply to under breast and scaly areas of scalp twice a day as needed, Disp: , Rfl:     multivitamin with minerals (multivitamin-iron-folic acid) tablet, Take 1 tablet by mouth once daily., Disp: , Rfl:     ondansetron ODT (Zofran-ODT) 4 mg disintegrating tablet, Take 1 tablet (4 mg) by mouth 3 times a day as needed for nausea., Disp: 20 tablet, Rfl: 3    rosuvastatin (Crestor) 5 mg tablet, Take 1 tablet (5 mg) by mouth once daily., Disp: 90 tablet, Rfl: 3    semaglutide (Ozempic) 1 mg/dose (4 mg/3 mL) pen injector, Inject 2 mg under the skin 1 (one) time per week., Disp: , Rfl:     topiramate (Topamax) 50 mg tablet, TAKE 1 TABLET BY MOUTH TWICE A DAY, Disp: 180 tablet, Rfl: 0    Ubrelvy 100 mg tablet tablet, Take 1 tablet (100 mg) by mouth if needed (migraine)., Disp: 16 tablet, Rfl: 5    valsartan (Diovan) 320 mg tablet, Take 1 tablet (320 mg) by mouth once daily., Disp: 90 tablet, Rfl: 3    Past Medical History:   Diagnosis Date    Abnormal Pap smear of cervix     Atrial fibrillation (Multi)     Breast cancer (Multi)     right breast cancer 5/22    Cervicalgia 09/21/2021    Neck pain, chronic    Corns and callosities 03/25/2022    Corn of foot    Encounter for screening for malignant neoplasm of colon 08/17/2020    Colon cancer screening    HPV (human papilloma virus) infection     Hypertension     Ingrowing nail 11/15/2022    Ingrown toenail    Low back pain, unspecified 08/13/2020    Acute right-sided low back pain without sciatica    Meralgia paresthetica, left lower limb 08/13/2020    Meralgia paresthetica of left  side    Migraine     Obesity, unspecified 08/31/2022    Class 1 obesity with body mass index (BMI) of 32.0 to 32.9 in adult    Otalgia, left ear 04/20/2020    Left ear pain    Other chronic allergic conjunctivitis 10/06/2021    Other chronic allergic conjunctivitis of both eyes    Other conditions influencing health status     Back sprain    Other conditions influencing health status 02/10/2022    History of cough    Other instability, right ankle 04/21/2021    Other instability, right ankle    Other obesity due to excess calories 06/14/2022    Class 1 obesity due to excess calories with serious comorbidity and body mass index (BMI) of 32.0 to 32.9 in adult    Other specified soft tissue disorders 05/27/2021    Swelling of left foot    Otitis media, unspecified, left ear 04/20/2020    Infection of left ear    Pain in left leg 03/13/2021    Pain of left lower extremity    Pain in unspecified ankle and joints of unspecified foot 04/21/2021    Ankle pain    Pain in unspecified hip 09/21/2022    Hip pain, acute    Personal history of diseases of the skin and subcutaneous tissue 12/07/2021    History of dermatitis    Personal history of other diseases of the circulatory system 09/14/2022    History of sinoatrial node dysfunction    Personal history of other diseases of the circulatory system 12/01/2021    History of atrial fibrillation    Personal history of other diseases of the digestive system 03/25/2022    History of constipation    Personal history of other diseases of the female genital tract     History of abnormal cervical Papanicolaou smear    Personal history of other diseases of the respiratory system 02/10/2022    History of sinusitis    Personal history of other diseases of the respiratory system 08/05/2022    History of paranasal sinus congestion    Personal history of other drug therapy 01/18/2022    History of pneumococcal vaccination    Personal history of other endocrine, nutritional and metabolic  disease 12/01/2021    History of obesity    Personal history of other medical treatment 10/08/2019    History of screening mammography    Personal history of other specified conditions 11/17/2021    History of edema    Personal history of other specified conditions 09/14/2022    History of palpitations    Personal history of other specified conditions 07/15/2021    History of fatigue    Personal history of other specified conditions 03/25/2022    History of abdominal pain    Personal history of other specified conditions 08/31/2022    History of postnasal drip    Plantar fascial fibromatosis     Plantar fasciitis    Posterior tibial tendinitis, left leg 03/13/2021    Tibialis tendinitis of left lower extremity    Sleep apnea 2021    Spontaneous ecchymoses 11/17/2021    Petechiae    Strain of other muscle(s) and tendon(s) at lower leg level, right leg, initial encounter 06/18/2020    Strain of right calf muscle     Past Surgical History:   Procedure Laterality Date    ADENOIDECTOMY  Age 6    APPENDECTOMY  03/14/2016    Appendectomy    BREAST BIOPSY      BREAST LUMPECTOMY      BUNIONECTOMY  03/14/2016    Simple Bunion Exostectomy ([facility] Procedure)    CERVICAL BIOPSY  W/ LOOP ELECTRODE EXCISION      COLPOSCOPY      OTHER SURGICAL HISTORY  11/17/2021    Tonsillectomy with adenoidectomy    OTHER SURGICAL HISTORY  11/17/2021    Bunionectomy    OTHER SURGICAL HISTORY  11/17/2021    Cervical loop electrosurgical excision    OTHER SURGICAL HISTORY  06/22/2022    Lumpectomy     Family History   Problem Relation Name Age of Onset    Hypertension Mother C     Stroke Father Wilberto     Hypertension Father Wilberto     Atrial fibrillation Father Wilberto     Heart disease Father Wilberto     Breast cancer Mother's Sister Nelia      Social History     Tobacco Use    Smoking status: Never    Smokeless tobacco: Never   Substance Use Topics    Alcohol use: Not Currently     Comment: once or twice a month     Social History     Substance and  Sexual Activity   Drug Use Never        ROS  As noted in HPI, otherwise all other systems have been reviewed are negative for complaint.     Objective   General Appearance: Aimee is well-developed, well-nourished, 59 y.o. year old female, in no acute distress. Makes good eye contact, is alert, interactive, and cooperative. Demonstrates recent & remote memory recall. Subjective information consistent with objective assessment.     There were no vitals filed for this visit.    Lab Results   Component Value Date    WBC 4.3 (L) 09/25/2024    RBC 4.70 09/25/2024    HGB 14.8 09/25/2024    HCT 45.1 09/25/2024     09/25/2024     09/25/2024    K 4.1 09/25/2024     (H) 09/25/2024    BUN 19 09/25/2024    CREATININE 0.89 09/25/2024    EGFR 76 09/25/2024    CALCIUM 9.9 09/25/2024    ALKPHOS 65 09/25/2024    AST 18 09/25/2024    ALT 19 09/25/2024    MG 2.27 09/25/2024    VITD25 42 09/25/2024    HGBA1C 5.4 03/27/2024    LDLCALC 102 (H) 09/25/2024    CHOL 181 09/25/2024    HDL 62.4 09/25/2024    TRIG 82 09/25/2024    TSH 1.00 09/25/2024          Assessment & Plan  Migraine without aura and without status migrainosus, not intractable    Orders:    Ubrelvy 100 mg tablet tablet; Take 1 tablet (100 mg) by mouth if needed (migraine).         ASSESSMENT/PLAN:  Continue topiramate for preventative treatment.  Continue Ubrelvy for abortive treatment.  Follow-up in 6 months or sooner if needed.    Shima Tee, APRN-CNP

## 2024-11-18 NOTE — ASSESSMENT & PLAN NOTE
Orders:    Ubrelvy 100 mg tablet tablet; Take 1 tablet (100 mg) by mouth if needed (migraine).

## 2024-11-22 DIAGNOSIS — I10 ESSENTIAL HYPERTENSION: ICD-10-CM

## 2024-11-22 RX ORDER — HYDRALAZINE HYDROCHLORIDE 50 MG/1
50 TABLET, FILM COATED ORAL 3 TIMES DAILY
Qty: 270 TABLET | Refills: 0 | Status: SHIPPED | OUTPATIENT
Start: 2024-11-22

## 2024-11-24 DIAGNOSIS — R91.8 LUNG NODULES: Primary | ICD-10-CM

## 2024-12-04 ENCOUNTER — SPECIALTY PHARMACY (OUTPATIENT)
Dept: PHARMACY | Facility: CLINIC | Age: 59
End: 2024-12-04

## 2024-12-04 PROCEDURE — RXMED WILLOW AMBULATORY MEDICATION CHARGE

## 2024-12-11 ENCOUNTER — PHARMACY VISIT (OUTPATIENT)
Dept: PHARMACY | Facility: CLINIC | Age: 59
End: 2024-12-11
Payer: COMMERCIAL

## 2024-12-13 ENCOUNTER — OFFICE VISIT (OUTPATIENT)
Dept: SURGERY | Facility: HOSPITAL | Age: 59
End: 2024-12-13
Payer: COMMERCIAL

## 2024-12-13 ENCOUNTER — HOSPITAL ENCOUNTER (OUTPATIENT)
Dept: RADIOLOGY | Facility: HOSPITAL | Age: 59
Discharge: HOME | End: 2024-12-13
Payer: COMMERCIAL

## 2024-12-13 VITALS
DIASTOLIC BLOOD PRESSURE: 80 MMHG | WEIGHT: 171 LBS | SYSTOLIC BLOOD PRESSURE: 118 MMHG | BODY MASS INDEX: 25.33 KG/M2 | HEIGHT: 69 IN

## 2024-12-13 VITALS — WEIGHT: 166 LBS | HEIGHT: 69 IN | BODY MASS INDEX: 24.59 KG/M2

## 2024-12-13 DIAGNOSIS — Z17.1 MALIGNANT NEOPLASM OF RIGHT BREAST IN FEMALE, ESTROGEN RECEPTOR NEGATIVE, UNSPECIFIED SITE OF BREAST: ICD-10-CM

## 2024-12-13 DIAGNOSIS — C50.911 MALIGNANT NEOPLASM OF RIGHT BREAST IN FEMALE, ESTROGEN RECEPTOR NEGATIVE, UNSPECIFIED SITE OF BREAST: ICD-10-CM

## 2024-12-13 DIAGNOSIS — C50.911 MALIGNANT NEOPLASM OF RIGHT BREAST IN FEMALE, ESTROGEN RECEPTOR NEGATIVE, UNSPECIFIED SITE OF BREAST: Primary | ICD-10-CM

## 2024-12-13 DIAGNOSIS — Z17.1 MALIGNANT NEOPLASM OF RIGHT BREAST IN FEMALE, ESTROGEN RECEPTOR NEGATIVE, UNSPECIFIED SITE OF BREAST: Primary | ICD-10-CM

## 2024-12-13 PROCEDURE — 77062 BREAST TOMOSYNTHESIS BI: CPT

## 2024-12-13 PROCEDURE — 99213 OFFICE O/P EST LOW 20 MIN: CPT | Performed by: SURGERY

## 2024-12-13 PROCEDURE — 3079F DIAST BP 80-89 MM HG: CPT | Performed by: SURGERY

## 2024-12-13 PROCEDURE — 3074F SYST BP LT 130 MM HG: CPT | Performed by: SURGERY

## 2024-12-13 PROCEDURE — 3008F BODY MASS INDEX DOCD: CPT | Performed by: SURGERY

## 2024-12-13 ASSESSMENT — ENCOUNTER SYMPTOMS
GASTROINTESTINAL NEGATIVE: 1
MUSCULOSKELETAL NEGATIVE: 1
ALLERGIC/IMMUNOLOGIC NEGATIVE: 1
NEUROLOGICAL NEGATIVE: 1
PSYCHIATRIC NEGATIVE: 1
CARDIOVASCULAR NEGATIVE: 1
EYES NEGATIVE: 1
RESPIRATORY NEGATIVE: 1
ENDOCRINE NEGATIVE: 1
HEMATOLOGIC/LYMPHATIC NEGATIVE: 1
CONSTITUTIONAL NEGATIVE: 1

## 2024-12-13 NOTE — PROGRESS NOTES
Subjective   Patient ID: Aimee Ponce is a 59 y.o. female who presents for Follow-up (6 month follow up).  HPI    No new complaints     Interval imagin2022: Bilateral diagnostic mammogram normal category 2 findings.   23: Bilateral diagnostic mammogram normal category 2 findings.   2024: Bilateral diagnostic mammogram with new grouped indeterminate microcalcifications right breast upper outer quadrant middle depth just deep to the prior treatment site.  Category 4.    2022: partial mastectomy right breast, final pathology DCIS with clear margins. ER/PRnegative. No complaints.  [id number]     Completed adjuvant radiation 2022     Review of Systems   Constitutional: Negative.    HENT: Negative.     Eyes: Negative.    Respiratory: Negative.     Cardiovascular: Negative.    Gastrointestinal: Negative.    Endocrine: Negative.    Genitourinary: Negative.    Musculoskeletal: Negative.    Skin: Negative.    Allergic/Immunologic: Negative.    Neurological: Negative.    Hematological: Negative.    Psychiatric/Behavioral: Negative.     Past Medical History  Problems    · History of Acute right-sided low back pain without sciatica (724.2) (M54.50)   · Resolved Date: 23 Oct 2020   · History of Ankle pain (719.47) (M25.579)   · Resolved Date: 21 Sep 2022   · History of Back sprain (847.9)   · History of Class 1 obesity due to excess calories with serious comorbidity and body  mass index (BMI) of 32.0 to 32.9 in adult (278.00,V85.32) (E66.09,Z68.32)   · Resolved Date: 14 Sep 2022   · History of Class 1 obesity with body mass index (BMI) of 32.0 to 32.9 in adult  (278.00,V85.32) (E66.9,Z68.32)   · Resolved Date: 14 Sep 2022   · History of Colon cancer screening (V76.51) (Z12.11)   · Resolved Date: 21 Sep 2022   · History of Corn of foot (700) (L84)   · Resolved Date: 2022   · History of Hip pain, acute (719.45) (M25.559)   · Resolved Date: 14 Dec 2022   · History of abdominal pain (V13.89)  (Z87.898)   · Resolved Date: 21 Sep 2022   · History of abnormal cervical Papanicolaou smear (V13.29) (Z87.42)   · History of atrial fibrillation (V12.59) (Z86.79)   · Resolved Date: 14 Jun 2022   · History of constipation (V12.79) (Z87.19)   · Resolved Date: 14 Jun 2022   · History of cough   · Resolved Date: 14 Jun 2022   · History of dermatitis (V13.3) (Z87.2)   · Resolved Date: 14 Jun 2022   · History of edema (V13.89) (Z87.898)   · Resolved Date: 14 Jun 2022   · History of fatigue (V13.89) (Z87.898)   · Resolved Date: 14 Jun 2022   · History of obesity (V12.29) (Z86.39)   · Resolved Date: 14 Jun 2022   · History of palpitations (V12.59) (Z87.898)   · Resolved Date: 14 Dec 2022   · History of paranasal sinus congestion (V12.69) (Z87.09)   · Resolved Date: 21 Sep 2022   · History of pneumococcal vaccination (V49.89) (Z92.29)   · Resolved Date: 21 Sep 2022   · History of postnasal drip (V13.89) (Z87.898)   · Resolved Date: 21 Sep 2022   · History of screening mammography (V15.89) (Z92.89)   · Resolved Date: 21 Sep 2022   · History of sinoatrial node dysfunction (V12.59) (Z86.79)   · Resolved Date: 14 Dec 2022   · History of sinusitis (V12.69) (Z87.09)   · Resolved Date: 14 Jun 2022   · History of Infection of left ear (382.9) (H66.92)   · Resolved Date: 23 Oct 2020   · History of Left ear pain (388.70) (H92.02)   · Resolved Date: 23 Oct 2020   · History of Meralgia paresthetica of left side (355.1) (G57.12)   · Resolved Date: 23 Oct 2020   · History of Neck pain, chronic (723.1,338.29) (M54.2,G89.29)   · Resolved Date: 21 Sep 2022   · History of Other chronic allergic conjunctivitis of both eyes (372.14) (H10.45)   · Resolved Date: 14 Jun 2022   · History of Other instability, right ankle (718.87) (M25.371)   · Resolved Date: 14 Jun 2022   · History of Pain of left lower extremity (729.5) (M79.605)   · Resolved Date: 14 Jun 2022   · History of Petechiae (782.7) (R23.3)   · Resolved Date: 14 Jun 2022   · History of  Plantar fasciitis (728.71) (M72.2)   · History of Strain of right calf muscle (844.8) (S86.811A)   · Resolved Date: 23 Oct 2020   · History of Swelling of left foot (729.81) (M79.89)   · Resolved Date: 14 Jun 2022   · History of Tibialis tendinitis of left lower extremity (726.72) (M76.822)   · Resolved Date: 14 Jun 2022     Surgical History  Problems    · History of Appendectomy   · History of Bunionectomy   · History of Carpal tunnel surgery   · 12/2022   · History of Cervical loop electrosurgical excision   · History of Lumpectomy   · History of Simple Bunion Exostectomy ([facility] Procedure)   · History of Tonsillectomy with adenoidectomy    Family History   Problem Relation Name Age of Onset   • Hypertension Mother ELIUD    • Stroke Father Wilberto    • Hypertension Father Wilberto    • Atrial fibrillation Father Wilberto    • Heart disease Father Wilberto    • Breast cancer Mother's Sister Nelia       Social History     Socioeconomic History   • Marital status:    Tobacco Use   • Smoking status: Never   • Smokeless tobacco: Never   Vaping Use   • Vaping status: Never Used   Substance and Sexual Activity   • Alcohol use: Not Currently     Comment: once or twice a month   • Drug use: Never   • Sexual activity: Not Currently     Birth control/protection: None          Current Outpatient Medications:   •  artificial tears, dextran-hypomel-glycerin, 0.1-0.3-0.2 % ophthalmic solution, Administer 1 drop into both eyes if needed for dry eyes., Disp: , Rfl:   •  calcium carbonate 600 mg calcium (1,500 mg) tablet, Take 1 tablet (1,500 mg) by mouth once daily., Disp: , Rfl:   •  cholecalciferol (Vitamin D-3) 50 mcg (2,000 unit) capsule, Take 2 capsules (100 mcg) by mouth early in the morning.., Disp: , Rfl:   •  docusate sodium (Colace) 100 mg capsule, Take 2 capsules (200 mg) by mouth once daily., Disp: , Rfl:   •  dofetilide (Tikosyn) 500 mcg capsule, Take 1 capsule (500 mcg) by mouth 2 times a day., Disp: 180 capsule, Rfl:  3  •  Eliquis 5 mg tablet, Take 1 tablet (5 mg) by mouth 2 times a day., Disp: 180 tablet, Rfl: 3  •  hydrALAZINE (Apresoline) 50 mg tablet, Take 1 tablet (50 mg) by mouth 3 times a day., Disp: 270 tablet, Rfl: 0  •  loratadine (Claritin) 10 mg tablet, Take 1 tablet (10 mg) by mouth once daily., Disp: , Rfl:   •  magnesium oxide (Mag-Ox) 400 mg (241.3 mg magnesium) tablet, Take 1 tablet (400 mg) by mouth once daily., Disp: 90 tablet, Rfl: 3  •  melatonin 1 mg tablet,chewable, Chew 2 mg once daily at bedtime., Disp: , Rfl:   •  metaxalone (Skelaxin) 800 mg tablet, Take 1 tablet (800 mg) by mouth once daily as needed., Disp: , Rfl:   •  metoprolol succinate XL (Toprol-XL) 25 mg 24 hr tablet, Take 1 tablet (25 mg) by mouth once daily at bedtime., Disp: 90 tablet, Rfl: 3  •  miconazole (Lotrimin AF) 2 % powder, Apply topically 2 times a day. (Patient taking differently: Apply topically if needed.), Disp: 85 g, Rfl: 11  •  mometasone (Elocon) 0.1 % cream, Apply to under breast and scaly areas of scalp twice a day as needed, Disp: , Rfl:   •  multivitamin with minerals (multivitamin-iron-folic acid) tablet, Take 1 tablet by mouth once daily., Disp: , Rfl:   •  ondansetron ODT (Zofran-ODT) 4 mg disintegrating tablet, Take 1 tablet (4 mg) by mouth 3 times a day as needed for nausea., Disp: 20 tablet, Rfl: 3  •  rosuvastatin (Crestor) 5 mg tablet, Take 1 tablet (5 mg) by mouth once daily., Disp: 90 tablet, Rfl: 3  •  semaglutide (Ozempic) 1 mg/dose (4 mg/3 mL) pen injector, Inject 2 mg under the skin 1 (one) time per week., Disp: , Rfl:   •  topiramate (Topamax) 50 mg tablet, TAKE 1 TABLET BY MOUTH TWICE A DAY, Disp: 180 tablet, Rfl: 0  •  Ubrelvy 100 mg tablet tablet, Take 1 tablet (100 mg) by mouth if needed (migraine)., Disp: 16 tablet, Rfl: 5  •  valsartan (Diovan) 320 mg tablet, Take 1 tablet (320 mg) by mouth once daily., Disp: 90 tablet, Rfl: 3     Objective   Vitals:    12/13/24 1431   BP: 118/80      Physical  Exam  Constitutional - General appearance: In no acute distress, well appearing and well nourished.   Neck - Exam: Appearance of the neck was normal. No neck masses and no adenopathy observed.   Pulmonary - Respiratory effort: Normal respiration.   Chest - Breasts: Normal, no dimpling or skin changes appreciated. Right breast incision well-healed. Mild skin pigmentation c/w radiation change.  Assessment/Plan   Problem List Items Addressed This Visit    None    Clinically doing well.  Imaging findings discussed.  Stereotactic biopsy right breast, with follow-up for discussion regarding pathology and further recommendations.    Chrissy Benavides MD

## 2024-12-17 ENCOUNTER — TELEPHONE (OUTPATIENT)
Dept: CARDIOLOGY | Facility: CLINIC | Age: 59
End: 2024-12-17
Payer: COMMERCIAL

## 2024-12-17 NOTE — TELEPHONE ENCOUNTER
Patient called and left a voicemail stating that she is to hold her Eliquis for 2 days prior to her breast biopsy. She is asking when she can resume it. I returned call and instructed her she can resume the next day as long as there is no issues with bleeding. She verbalized understanding.

## 2024-12-19 ENCOUNTER — HOSPITAL ENCOUNTER (OUTPATIENT)
Dept: RADIOLOGY | Facility: HOSPITAL | Age: 59
Discharge: HOME | End: 2024-12-19
Payer: COMMERCIAL

## 2024-12-19 VITALS
DIASTOLIC BLOOD PRESSURE: 80 MMHG | SYSTOLIC BLOOD PRESSURE: 131 MMHG | RESPIRATION RATE: 18 BRPM | HEART RATE: 68 BPM | OXYGEN SATURATION: 98 %

## 2024-12-19 DIAGNOSIS — R92.8 ABNORMAL MAMMOGRAM: ICD-10-CM

## 2024-12-19 DIAGNOSIS — R92.8 ABNORMAL MAMMOGRAM: Primary | ICD-10-CM

## 2024-12-19 PROCEDURE — 19081 BX BREAST 1ST LESION STRTCTC: CPT | Mod: RIGHT SIDE | Performed by: RADIOLOGY

## 2024-12-19 PROCEDURE — 76098 X-RAY EXAM SURGICAL SPECIMEN: CPT

## 2024-12-19 PROCEDURE — A4648 IMPLANTABLE TISSUE MARKER: HCPCS

## 2024-12-19 PROCEDURE — 2720000007 HC OR 272 NO HCPCS

## 2024-12-19 PROCEDURE — 77065 DX MAMMO INCL CAD UNI: CPT | Mod: RIGHT SIDE | Performed by: RADIOLOGY

## 2024-12-19 PROCEDURE — 2500000004 HC RX 250 GENERAL PHARMACY W/ HCPCS (ALT 636 FOR OP/ED): Performed by: RADIOLOGY

## 2024-12-19 PROCEDURE — 19081 BX BREAST 1ST LESION STRTCTC: CPT | Mod: RT

## 2024-12-19 PROCEDURE — 2780000003 HC OR 278 NO HCPCS

## 2024-12-19 RX ORDER — LIDOCAINE HYDROCHLORIDE 20 MG/ML
INJECTION, SOLUTION EPIDURAL; INFILTRATION; INTRACAUDAL; PERINEURAL AS NEEDED
Status: COMPLETED | OUTPATIENT
Start: 2024-12-19 | End: 2024-12-19

## 2024-12-19 ASSESSMENT — PAIN - FUNCTIONAL ASSESSMENT
PAIN_FUNCTIONAL_ASSESSMENT: 0-10
PAIN_FUNCTIONAL_ASSESSMENT: 0-10

## 2024-12-19 ASSESSMENT — PAIN SCALES - GENERAL
PAINLEVEL_OUTOF10: 0 - NO PAIN
PAINLEVEL_OUTOF10: 0 - NO PAIN

## 2024-12-19 NOTE — DISCHARGE INSTRUCTIONS
ACTIVITY  You may return to work or other activities the day of your biopsy, providing that these activities do not include heavy lifting or strenuous athletic sports.  Do not shower or bathe for the next 24 hours.  You may take a sponge bath, providing you take care to keep the biopsy area dry.  You may return to all of your normal activities after 48 hours as tolerated.  MEDICATIONS  Avoid aspirin for the next 24 hours. Use Tylenol if needed for discomfort over the next 48 hours.   Continue taking your normal medications as usual, except as noted below.  DRESSINGS  Keep the gauze dressing and your bra on day and night for the next 24 hours.  Remove the dressing 24 hours after the biopsy. You may bathe or shower at that time. The steri strips may come off when wet. Use a band aid if needed. Steri strips should be on no longer than 5 days.  Use ice pack if desired: 1st 24 hours- on 15 minutes, off 15 minutes.  OTHER INSTRUCTIONS  Watch for excessive bleeding, swelling, redness, pain or fever. If any of these occur, contact [facility]iology at [phone] between 8am and 5pm, or the Radiologists answering service at [phone] after 5pm. The Mammography department may be reached at [phone] Monday - Friday 7:30 am - 3:30 pm.  **For several days or even a couple of weeks, you may feel mild tenderness, “twinges”, or a small bump at the biopsy puncture site. This is normal. Applying moist heat to the area after 2 days may bring relief. This will disappear with time.    Dr. Benavides [phone]    Sameera Daniels Nurse Navigator [phone]

## 2024-12-21 ENCOUNTER — HOSPITAL ENCOUNTER (OUTPATIENT)
Dept: RADIOLOGY | Facility: CLINIC | Age: 59
Discharge: HOME | End: 2024-12-21
Payer: COMMERCIAL

## 2024-12-21 DIAGNOSIS — R91.8 LUNG NODULES: ICD-10-CM

## 2024-12-21 PROCEDURE — 71250 CT THORAX DX C-: CPT

## 2024-12-21 PROCEDURE — 71250 CT THORAX DX C-: CPT | Performed by: RADIOLOGY

## 2024-12-30 LAB
LAB AP ASR DISCLAIMER: NORMAL
LABORATORY COMMENT REPORT: NORMAL
PATH REPORT.COMMENTS IMP SPEC: NORMAL
PATH REPORT.FINAL DX SPEC: NORMAL
PATH REPORT.GROSS SPEC: NORMAL
PATH REPORT.RELEVANT HX SPEC: NORMAL
PATH REPORT.TOTAL CANCER: NORMAL

## 2025-01-02 ENCOUNTER — TELEMEDICINE (OUTPATIENT)
Dept: PRIMARY CARE | Facility: CLINIC | Age: 60
End: 2025-01-02
Payer: COMMERCIAL

## 2025-01-02 DIAGNOSIS — R91.8 LUNG NODULES: Primary | ICD-10-CM

## 2025-01-02 RX ORDER — METHOCARBAMOL 500 MG/1
500 TABLET, FILM COATED ORAL
COMMUNITY
Start: 2024-07-01

## 2025-01-02 SDOH — ECONOMIC STABILITY: FOOD INSECURITY: WITHIN THE PAST 12 MONTHS, YOU WORRIED THAT YOUR FOOD WOULD RUN OUT BEFORE YOU GOT MONEY TO BUY MORE.: NEVER TRUE

## 2025-01-02 SDOH — ECONOMIC STABILITY: FOOD INSECURITY: WITHIN THE PAST 12 MONTHS, THE FOOD YOU BOUGHT JUST DIDN'T LAST AND YOU DIDN'T HAVE MONEY TO GET MORE.: NEVER TRUE

## 2025-01-02 ASSESSMENT — ENCOUNTER SYMPTOMS
OCCASIONAL FEELINGS OF UNSTEADINESS: 0
DEPRESSION: 0
LOSS OF SENSATION IN FEET: 0

## 2025-01-02 ASSESSMENT — LIFESTYLE VARIABLES
HOW OFTEN DO YOU HAVE A DRINK CONTAINING ALCOHOL: NEVER
HOW OFTEN DO YOU HAVE SIX OR MORE DRINKS ON ONE OCCASION: NEVER
AUDIT-C TOTAL SCORE: 0
SKIP TO QUESTIONS 9-10: 1
HOW MANY STANDARD DRINKS CONTAINING ALCOHOL DO YOU HAVE ON A TYPICAL DAY: PATIENT DOES NOT DRINK

## 2025-01-02 ASSESSMENT — PAIN SCALES - GENERAL: PAINLEVEL_OUTOF10: 0-NO PAIN

## 2025-01-02 NOTE — PATIENT INSTRUCTIONS
Stable lung nodules largest of which measuring up to 7 mm in the left lower lobe.   No new or enlarging lung nodules.    Recommend CT Chest 18 months.    She will be notified of results as they become available.     Patient to follow up with PCP/Cardiologist re:   Scattered arthrosclerotic calcifications

## 2025-01-02 NOTE — PROGRESS NOTES
Subjective   Patient ID: Aimee Ponce is a 59 y.o. female who presents for Follow-up (Aimee Ponce has a follow up visit with a CT scan prior./).  [id number] y.o. female presents today for lung nodule clinic    Telephone visit between Aimee Ponce and Sonia Palacios CNP at [facility] per patient request.     Never smoker  No family history of lung cancer  Personal history of cervical dysplasia with a LEEP.   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.  Patient had resolving cough at time of CT chest.     CT chest without IV contrast dated 12/21/2024  There is no focal infiltrate, pleural effusion or pneumothorax  identified. Scattered tiny subpleural nodular densities are seen  bilaterally, similar to prior studies. No new or enlarging pulmonary  nodules or masses are seen.    CT chest dated 6/20/2023  LUNG/PLEURA/LARGE AIRWAYS:  The trachea and central airways are patent. No endobronchial lesion.  No atelectasis, consolidation or effusion. Mild subpleural scarring,  in lateral segment of right middle lobe at image 194/325.  A subpleural nodule, in the right upper lobe laterally at image  103/325 measures 1.6 mm and is unchanged from 06/20/2022.  2 mm nodule right upper lobe at image 50/325 unchanged.  Calcific granuloma right middle lobe at image 137/325 unchanged.  2 mm nodule right lower lobe subpleural location at image 234/325  unchanged.  Pleural based nodule left lower lobe at image 162/3 unchanged  measuring 7 mm. No new nodules.     6/20/2022 CT chest.  Previously described nodules are unchanged and are solids: 2 mm right  upper lobe, image 34 of 304. 2 mm right upper lobe, image 85. 3 mm in  the right middle lobe, most likely a calcified granuloma, image 119.  2 mm nodule in the right lower lobe, image 222. Stable pleural-based  nodular opacities in the left lower lobe, example, image 153. No new  pulmonary nodule or  mass. No focal areas of consolidation. No  evidence of an effusion or pneumothorax.  IMPRESSION:  Stable pulmonary nodules measuring 3 mm or less in size. Stable  pleural base nodular opacities measuring up to 7 mm. No new pulmonary  nodule or mass.     3/11/2022 CT chest  There are solid pulmonary nodules detailed as follows:     Within the right upper lobe there is a 2 mm subpleural solid nodule  (axial image 27/309), 2 mm subpleural solid nodule (axial image  79/309).  Within the right middle lobe there is a 3 mm calcified granuloma  (axial image 112/309).  Within the right lower lobe there is a 2 mm nodule (axial image  213/309).  Impression:  Within the left lower lobe there is a 2 mm solid nodule (axial image  123/309). There are a few posterior pleural-based nodular densities  for example up to 7 mm on the left image 124.     Multiple pulmonary nodules measuring up to 7 mm as described.  Consider follow up non contrast chest CT at 3-6 months, then consider  CT chest at 18-24 months (Castro Parnell et al., Guidelines for  management of incidental pulmonary nodules detected on CT images:  From the Fleischner Society 2017, Radiology. 2017 Jul;284  (1):228-243.) [email or web address]       Review of Systems  Review of systems: Present-feeling well. Not present-chills, fatigue and fever.  Respiratory: Not present-difficulty breathing, cough, bloody sputum.  Cardiovascular: Not present-chest pain, palpitations, dyspnea on exertion.  Objective   There were no vitals taken for this visit.     Physical Exam  Gen.: Mental status-alert. Gen. appearance-cooperative, well groomed and consistent with stated age. Not in acute distress or sickly. Orientation-oriented to time, place, purpose and person. Build and nutrition-well-nourished and well-developed. Hydration-well-hydrated.    Integumentary: General characteristics-overall examination the patient´s skin reveals-no suspicious lesions, no bruises and no evidence of scars.  Color-normal coloration skin. Skin moisture-normal skin moisture.    Chest and lung exam: Auscultation-normal breath sounds, no adventitious lung sounds and normal vocal resonance. Chest wall is normal in shape and non-tender.    Cardiovascular: Auscultation: Regular rate and rhythm. Heart sounds-normal heart sounds, S1-S2. No murmurs or gallops appreciated. Carotid arteries normal and without bruit.  Assessment/Plan   Diagnoses and all orders for this visit:  Lung nodules

## 2025-01-05 ENCOUNTER — SPECIALTY PHARMACY (OUTPATIENT)
Dept: PHARMACY | Facility: CLINIC | Age: 60
End: 2025-01-05

## 2025-01-05 PROCEDURE — RXMED WILLOW AMBULATORY MEDICATION CHARGE

## 2025-01-08 ENCOUNTER — PHARMACY VISIT (OUTPATIENT)
Dept: PHARMACY | Facility: CLINIC | Age: 60
End: 2025-01-08
Payer: COMMERCIAL

## 2025-01-08 ENCOUNTER — OFFICE VISIT (OUTPATIENT)
Dept: SURGERY | Facility: HOSPITAL | Age: 60
End: 2025-01-08
Payer: COMMERCIAL

## 2025-01-08 VITALS — BODY MASS INDEX: 25.25 KG/M2 | HEIGHT: 69 IN

## 2025-01-08 DIAGNOSIS — Z17.1 MALIGNANT NEOPLASM OF RIGHT BREAST IN FEMALE, ESTROGEN RECEPTOR NEGATIVE, UNSPECIFIED SITE OF BREAST: Primary | ICD-10-CM

## 2025-01-08 DIAGNOSIS — C50.911 MALIGNANT NEOPLASM OF RIGHT BREAST IN FEMALE, ESTROGEN RECEPTOR NEGATIVE, UNSPECIFIED SITE OF BREAST: Primary | ICD-10-CM

## 2025-01-08 PROCEDURE — 1036F TOBACCO NON-USER: CPT | Performed by: SURGERY

## 2025-01-08 PROCEDURE — 99212 OFFICE O/P EST SF 10 MIN: CPT | Performed by: SURGERY

## 2025-01-08 ASSESSMENT — ENCOUNTER SYMPTOMS
CONSTITUTIONAL NEGATIVE: 1
EYES NEGATIVE: 1
ALLERGIC/IMMUNOLOGIC NEGATIVE: 1
CARDIOVASCULAR NEGATIVE: 1
GASTROINTESTINAL NEGATIVE: 1
HEMATOLOGIC/LYMPHATIC NEGATIVE: 1
NEUROLOGICAL NEGATIVE: 1
PSYCHIATRIC NEGATIVE: 1
RESPIRATORY NEGATIVE: 1
MUSCULOSKELETAL NEGATIVE: 1
ENDOCRINE NEGATIVE: 1

## 2025-01-08 NOTE — PROGRESS NOTES
Subjective   Patient ID: Aimee Ponce is a 59 y.o. female who presents for Follow-up (Biopsy results).  HPI    No new complaints     Interval imagin2022: Bilateral diagnostic mammogram normal category 2 findings.   23: Bilateral diagnostic mammogram normal category 2 findings.   2024: Bilateral diagnostic mammogram with new grouped indeterminate microcalcifications right breast upper outer quadrant middle depth just deep to the prior treatment site.  Category 4.  Status post stereotactic biopsy right breast with benign findings of postradiation change, fibrous tissue.      2022: partial mastectomy right breast, final pathology DCIS with clear margins. ER/PRnegative. No complaints.  [id number]     Completed adjuvant radiation 2022     Review of Systems   Constitutional: Negative.    HENT: Negative.     Eyes: Negative.    Respiratory: Negative.     Cardiovascular: Negative.    Gastrointestinal: Negative.    Endocrine: Negative.    Genitourinary: Negative.    Musculoskeletal: Negative.    Skin: Negative.    Allergic/Immunologic: Negative.    Neurological: Negative.    Hematological: Negative.    Psychiatric/Behavioral: Negative.     Past Medical History  Problems    · History of Acute right-sided low back pain without sciatica (724.2) (M54.50)   · Resolved Date: 23 Oct 2020   · History of Ankle pain (719.47) (M25.579)   · Resolved Date: 21 Sep 2022   · History of Back sprain (847.9)   · History of Class 1 obesity due to excess calories with serious comorbidity and body  mass index (BMI) of 32.0 to 32.9 in adult (278.00,V85.32) (E66.09,Z68.32)   · Resolved Date: 14 Sep 2022   · History of Class 1 obesity with body mass index (BMI) of 32.0 to 32.9 in adult  (278.00,V85.32) (E66.9,Z68.32)   · Resolved Date: 14 Sep 2022   · History of Colon cancer screening (V76.51) (Z12.11)   · Resolved Date: 21 Sep 2022   · History of Corn of foot (700) (L84)   · Resolved Date: 2022   · History of Hip  pain, acute (719.45) (M25.559)   · Resolved Date: 14 Dec 2022   · History of abdominal pain (V13.89) (Z87.898)   · Resolved Date: 21 Sep 2022   · History of abnormal cervical Papanicolaou smear (V13.29) (Z87.42)   · History of atrial fibrillation (V12.59) (Z86.79)   · Resolved Date: 14 Jun 2022   · History of constipation (V12.79) (Z87.19)   · Resolved Date: 14 Jun 2022   · History of cough   · Resolved Date: 14 Jun 2022   · History of dermatitis (V13.3) (Z87.2)   · Resolved Date: 14 Jun 2022   · History of edema (V13.89) (Z87.898)   · Resolved Date: 14 Jun 2022   · History of fatigue (V13.89) (Z87.898)   · Resolved Date: 14 Jun 2022   · History of obesity (V12.29) (Z86.39)   · Resolved Date: 14 Jun 2022   · History of palpitations (V12.59) (Z87.898)   · Resolved Date: 14 Dec 2022   · History of paranasal sinus congestion (V12.69) (Z87.09)   · Resolved Date: 21 Sep 2022   · History of pneumococcal vaccination (V49.89) (Z92.29)   · Resolved Date: 21 Sep 2022   · History of postnasal drip (V13.89) (Z87.898)   · Resolved Date: 21 Sep 2022   · History of screening mammography (V15.89) (Z92.89)   · Resolved Date: 21 Sep 2022   · History of sinoatrial node dysfunction (V12.59) (Z86.79)   · Resolved Date: 14 Dec 2022   · History of sinusitis (V12.69) (Z87.09)   · Resolved Date: 14 Jun 2022   · History of Infection of left ear (382.9) (H66.92)   · Resolved Date: 23 Oct 2020   · History of Left ear pain (388.70) (H92.02)   · Resolved Date: 23 Oct 2020   · History of Meralgia paresthetica of left side (355.1) (G57.12)   · Resolved Date: 23 Oct 2020   · History of Neck pain, chronic (723.1,338.29) (M54.2,G89.29)   · Resolved Date: 21 Sep 2022   · History of Other chronic allergic conjunctivitis of both eyes (372.14) (H10.45)   · Resolved Date: 14 Jun 2022   · History of Other instability, right ankle (718.87) (M25.371)   · Resolved Date: 14 Jun 2022   · History of Pain of left lower extremity (729.5) (M79.605)   · Resolved  Date: 14 Jun 2022   · History of Petechiae (782.7) (R23.3)   · Resolved Date: 14 Jun 2022   · History of Plantar fasciitis (728.71) (M72.2)   · History of Strain of right calf muscle (844.8) (S86.811A)   · Resolved Date: 23 Oct 2020   · History of Swelling of left foot (729.81) (M79.89)   · Resolved Date: 14 Jun 2022   · History of Tibialis tendinitis of left lower extremity (726.72) (M76.822)   · Resolved Date: 14 Jun 2022     Surgical History  Problems    · History of Appendectomy   · History of Bunionectomy   · History of Carpal tunnel surgery   · 12/2022   · History of Cervical loop electrosurgical excision   · History of Lumpectomy   · History of Simple Bunion Exostectomy (Silver Procedure)   · History of Tonsillectomy with adenoidectomy    Family History   Problem Relation Name Age of Onset    Hypertension Mother C     Stroke Father Wilberto     Hypertension Father Wilberto     Atrial fibrillation Father Wilberto     Heart disease Father Wilberto     Breast cancer Mother's Sister Nelia       Social History     Socioeconomic History    Marital status:    Tobacco Use    Smoking status: Never    Smokeless tobacco: Never   Vaping Use    Vaping status: Never Used   Substance and Sexual Activity    Alcohol use: Not Currently     Comment: once or twice a month    Drug use: Never    Sexual activity: Not Currently     Birth control/protection: None     Social Drivers of Health     Food Insecurity: No Food Insecurity (1/2/2025)    Hunger Vital Sign     Worried About Running Out of Food in the Last Year: Never true     Ran Out of Food in the Last Year: Never true          Current Outpatient Medications:     artificial tears, dextran-hypomel-glycerin, 0.1-0.3-0.2 % ophthalmic solution, Administer 1 drop into both eyes if needed for dry eyes., Disp: , Rfl:     calcium carbonate 600 mg calcium (1,500 mg) tablet, Take 1 tablet (1,500 mg) by mouth once daily., Disp: , Rfl:     cholecalciferol (Vitamin D-3) 50 mcg (2,000 unit)  capsule, Take 2 capsules (100 mcg) by mouth early in the morning.., Disp: , Rfl:     docusate sodium (Colace) 100 mg capsule, Take 2 capsules (200 mg) by mouth once daily., Disp: , Rfl:     dofetilide (Tikosyn) 500 mcg capsule, Take 1 capsule (500 mcg) by mouth 2 times a day., Disp: 180 capsule, Rfl: 3    Eliquis 5 mg tablet, Take 1 tablet (5 mg) by mouth 2 times a day., Disp: 180 tablet, Rfl: 3    hydrALAZINE (Apresoline) 50 mg tablet, Take 1 tablet (50 mg) by mouth 3 times a day., Disp: 270 tablet, Rfl: 0    loratadine (Claritin) 10 mg tablet, Take 1 tablet (10 mg) by mouth once daily., Disp: , Rfl:     magnesium oxide (Mag-Ox) 400 mg (241.3 mg magnesium) tablet, Take 1 tablet (400 mg) by mouth once daily., Disp: 90 tablet, Rfl: 3    melatonin 1 mg tablet,chewable, Chew 2 mg once daily at bedtime., Disp: , Rfl:     metaxalone (Skelaxin) 800 mg tablet, Take 1 tablet (800 mg) by mouth once daily as needed., Disp: , Rfl:     methocarbamol (Robaxin) 500 mg tablet, Take 1 tablet (500 mg) by mouth., Disp: , Rfl:     metoprolol succinate XL (Toprol-XL) 25 mg 24 hr tablet, Take 1 tablet (25 mg) by mouth once daily at bedtime., Disp: 90 tablet, Rfl: 3    miconazole (Lotrimin AF) 2 % powder, Apply topically 2 times a day. (Patient taking differently: Apply topically if needed.), Disp: 85 g, Rfl: 11    mometasone (Elocon) 0.1 % cream, Apply to under breast and scaly areas of scalp twice a day as needed, Disp: , Rfl:     multivitamin with minerals (multivitamin-iron-folic acid) tablet, Take 1 tablet by mouth once daily., Disp: , Rfl:     ondansetron ODT (Zofran-ODT) 4 mg disintegrating tablet, Take 1 tablet (4 mg) by mouth 3 times a day as needed for nausea., Disp: 20 tablet, Rfl: 3    rosuvastatin (Crestor) 5 mg tablet, Take 1 tablet (5 mg) by mouth once daily., Disp: 90 tablet, Rfl: 3    semaglutide (Ozempic) 1 mg/dose (4 mg/3 mL) pen injector, Inject 2 mg under the skin 1 (one) time per week., Disp: , Rfl:     topiramate  (Topamax) 50 mg tablet, TAKE 1 TABLET BY MOUTH TWICE A DAY, Disp: 180 tablet, Rfl: 0    Ubrelvy 100 mg tablet tablet, Take 1 tablet (100 mg) by mouth if needed (migraine)., Disp: 16 tablet, Rfl: 5    valsartan (Diovan) 320 mg tablet, Take 1 tablet (320 mg) by mouth once daily., Disp: 90 tablet, Rfl: 3     Objective   There were no vitals filed for this visit.     Physical Exam  Constitutional - General appearance: In no acute distress, well appearing and well nourished.   Neck - Exam: Appearance of the neck was normal. No neck masses and no adenopathy observed.   Pulmonary - Respiratory effort: Normal respiration.   Chest - Breasts: Normal, no dimpling or skin changes appreciated. Right breast incision well-healed. Mild skin pigmentation c/w radiation change.  Assessment/Plan   Problem List Items Addressed This Visit    None    Clinically doing well.  Imaging findings discussed.  Stereotactic biopsy right breast benign pathology discussed consistent with posttreatment effect, follow-up 6 months with diagnostic right mammogram.  Anticipate returning to bilateral diagnostic mammogram December 2025.  Chrissy Benavides MD

## 2025-01-29 ENCOUNTER — APPOINTMENT (OUTPATIENT)
Dept: CARDIOLOGY | Facility: CLINIC | Age: 60
End: 2025-01-29
Payer: COMMERCIAL

## 2025-02-03 ENCOUNTER — TELEPHONE (OUTPATIENT)
Dept: CARDIOLOGY | Facility: CLINIC | Age: 60
End: 2025-02-03
Payer: COMMERCIAL

## 2025-02-03 NOTE — TELEPHONE ENCOUNTER
Called patient regarding follow up with AND on 02/26. Calling to see if okay with pt to see Sandy BENAVIDEZ that day instead.

## 2025-02-04 NOTE — TELEPHONE ENCOUNTER
Called patient and left voicemail informing them we were moving their  appointment to be with Sandy the physician assistant. I included that the appointment date and time would not change and would remain scheduled for 02/26 at 10am.  I provided call back number for if there were any issues

## 2025-02-07 ENCOUNTER — SPECIALTY PHARMACY (OUTPATIENT)
Dept: PHARMACY | Facility: CLINIC | Age: 60
End: 2025-02-07

## 2025-02-11 ENCOUNTER — APPOINTMENT (OUTPATIENT)
Dept: PODIATRY | Facility: CLINIC | Age: 60
End: 2025-02-11
Payer: COMMERCIAL

## 2025-02-11 DIAGNOSIS — L29.9 ITCHING: ICD-10-CM

## 2025-02-11 DIAGNOSIS — L82.0 SEBORRHEIC KERATOSES, INFLAMED: Primary | ICD-10-CM

## 2025-02-11 PROCEDURE — 17110 DESTRUCTION B9 LES UP TO 14: CPT | Performed by: PODIATRIST

## 2025-02-11 PROCEDURE — 99212 OFFICE O/P EST SF 10 MIN: CPT | Performed by: PODIATRIST

## 2025-02-11 NOTE — PROGRESS NOTES
History Of Present Illness  Aimee Ponce is a 59 y.o. female presenting with chief complaint of: Painful lesion of left foot.  Patient has been wearing current orthotics with good relief of her discomfort in her feet.  She also has lymphedema and has been wearing compression hose.    PCP Lang Wu DO  Last visit 10/3/24     Past Medical History  She has a past medical history of Abnormal Pap smear of cervix, Atrial fibrillation (Multi), Breast cancer (Multi), Cervicalgia (09/21/2021), Corns and callosities (03/25/2022), Encounter for screening for malignant neoplasm of colon (08/17/2020), HPV (human papilloma virus) infection, Hypertension, Ingrowing nail (11/15/2022), Low back pain, unspecified (08/13/2020), Meralgia paresthetica, left lower limb (08/13/2020), Migraine, Obesity, unspecified (08/31/2022), Otalgia, left ear (04/20/2020), Other chronic allergic conjunctivitis (10/06/2021), Other conditions influencing health status, Other conditions influencing health status (02/10/2022), Other instability, right ankle (04/21/2021), Other obesity due to excess calories (06/14/2022), Other specified soft tissue disorders (05/27/2021), Otitis media, unspecified, left ear (04/20/2020), Pain in left leg (03/13/2021), Pain in unspecified ankle and joints of unspecified foot (04/21/2021), Pain in unspecified hip (09/21/2022), Personal history of diseases of the skin and subcutaneous tissue (12/07/2021), Personal history of irradiation, Personal history of other diseases of the circulatory system (09/14/2022), Personal history of other diseases of the circulatory system (12/01/2021), Personal history of other diseases of the digestive system (03/25/2022), Personal history of other diseases of the female genital tract, Personal history of other diseases of the respiratory system (02/10/2022), Personal history of other diseases of the respiratory system (08/05/2022), Personal history of other drug therapy (01/18/2022),  Personal history of other endocrine, nutritional and metabolic disease (12/01/2021), Personal history of other medical treatment (10/08/2019), Personal history of other specified conditions (11/17/2021), Personal history of other specified conditions (09/14/2022), Personal history of other specified conditions (07/15/2021), Personal history of other specified conditions (03/25/2022), Personal history of other specified conditions (08/31/2022), Plantar fascial fibromatosis, Posterior tibial tendinitis, left leg (03/13/2021), Sleep apnea (2021), Spontaneous ecchymoses (11/17/2021), and Strain of other muscle(s) and tendon(s) at lower leg level, right leg, initial encounter (06/18/2020).    She has no past medical history of antineoplastic chemo.    Surgical History  She has a past surgical history that includes Appendectomy (03/14/2016); Bunionectomy (03/14/2016); Other surgical history (11/17/2021); Other surgical history (11/17/2021); Other surgical history (11/17/2021); Other surgical history (06/22/2022); Breast lumpectomy; Adenoidectomy (Age 6); Colposcopy; Breast biopsy; and Cervical biopsy w/ loop electrode excision.     Social History  She reports that she has never smoked. She has never used smokeless tobacco. She reports that she does not currently use alcohol. She reports that she does not use drugs.    Family History  Family History   Problem Relation Name Age of Onset    Hypertension Mother C     Stroke Father Wilberto     Hypertension Father Wilberto     Atrial fibrillation Father Wilberto     Heart disease Father Wilberto     Breast cancer Mother's Sister Nelia         Allergies  Lisinopril    Medications  Current Outpatient Medications   Medication Sig Dispense Refill    artificial tears, dextran-hypomel-glycerin, 0.1-0.3-0.2 % ophthalmic solution Administer 1 drop into both eyes if needed for dry eyes.      calcium carbonate 600 mg calcium (1,500 mg) tablet Take 1 tablet (1,500 mg) by mouth once daily.       cholecalciferol (Vitamin D-3) 50 mcg (2,000 unit) capsule Take 2 capsules (100 mcg) by mouth early in the morning..      docusate sodium (Colace) 100 mg capsule Take 2 capsules (200 mg) by mouth once daily.      hydrALAZINE (Apresoline) 50 mg tablet Take 1 tablet (50 mg) by mouth 3 times a day. 270 tablet 0    loratadine (Claritin) 10 mg tablet Take 1 tablet (10 mg) by mouth once daily.      melatonin 1 mg tablet,chewable Chew 2 mg once daily at bedtime.      metaxalone (Skelaxin) 800 mg tablet Take 1 tablet (800 mg) by mouth once daily as needed.      methocarbamol (Robaxin) 500 mg tablet Take 1 tablet (500 mg) by mouth.      miconazole (Lotrimin AF) 2 % powder Apply topically 2 times a day. (Patient taking differently: Apply topically if needed.) 85 g 11    mometasone (Elocon) 0.1 % cream Apply to under breast and scaly areas of scalp twice a day as needed      multivitamin with minerals (multivitamin-iron-folic acid) tablet Take 1 tablet by mouth once daily.      ondansetron ODT (Zofran-ODT) 4 mg disintegrating tablet Take 1 tablet (4 mg) by mouth 3 times a day as needed for nausea. 20 tablet 3    semaglutide (Ozempic) 1 mg/dose (4 mg/3 mL) pen injector Inject 2 mg under the skin 1 (one) time per week.      topiramate (Topamax) 50 mg tablet TAKE 1 TABLET BY MOUTH TWICE A  tablet 0    Ubrelvy 100 mg tablet tablet Take 1 tablet (100 mg) by mouth if needed (migraine). 16 tablet 5    valsartan (Diovan) 320 mg tablet Take 1 tablet (320 mg) by mouth once daily. 90 tablet 3    dofetilide (Tikosyn) 500 mcg capsule Take 1 capsule (500 mcg) by mouth 2 times a day. 180 capsule 3    Eliquis 5 mg tablet Take 1 tablet (5 mg) by mouth 2 times a day. 180 tablet 3    metoprolol succinate XL (Toprol-XL) 25 mg 24 hr tablet Take 1 tablet (25 mg) by mouth once daily at bedtime. 90 tablet 3    rosuvastatin (Crestor) 5 mg tablet Take 1 tablet (5 mg) by mouth once daily. 90 tablet 3     No current facility-administered  medications for this visit.       Review of Systems    REVIEW OF SYSTEMS  GENERAL:  Negative for malaise, significant weight loss, fever  CARDIOVASCULAR: leg swelling   MUSCULOSKELETAL:  Negative for joint pain or swelling, back pain, and muscle pain.  SKIN:  Negative for lesions, rash, and itching  PSYCH:  Negative for sleep disturbance, mood disorder and recent psychosocial stressors  NEURO: Negative, denies any burning, tingling or numbness     Objective:   Vasc: DP and PT pulses are palpable bilateral.  CFT is less than 3 seconds bilateral.  Skin temperature is warm to cool proximal to distal bilateral.      Neuro:  Light touch is intact to the foot bilateral.   There is no clonus noted.  The hallux is downgoing bilateral.      Derm: Nails are normal. Skin is supple with normal texture and turgor noted.  Webspaces are clean, dry and intact bilateral.  Subleft fifth metatarsal base:  Patient has lesion (s) on plantar feet that are punctate with nucleated deep painful core    Ortho: Muscle strength is 5/5 for all pedal groups tested.  Ankle joint, subtalar joint, 1st MPJ and lesser MPJ ROM is full and without pain or crepitus.  The foot type is rectus bilateral off weight bearing.  There are no structural deformities noted.  Patient stands on new orthotics.  They seem to be fitting her well without any pressure points noted.  Assessment/Plan     Diagnoses and all orders for this visit:  Seborrheic keratoses, inflamed  Itching      Patient may have some friction secondary to her excess swelling in her legs.  Patient can continue to file this lesion down.  Lesion(s) are excised with scalpel blade.  Cryo treatment is performed for 30 seconds x 3

## 2025-02-21 DIAGNOSIS — E66.09 CLASS 1 OBESITY DUE TO EXCESS CALORIES WITH SERIOUS COMORBIDITY AND BODY MASS INDEX (BMI) OF 34.0 TO 34.9 IN ADULT: ICD-10-CM

## 2025-02-21 DIAGNOSIS — E66.811 CLASS 1 OBESITY DUE TO EXCESS CALORIES WITH SERIOUS COMORBIDITY AND BODY MASS INDEX (BMI) OF 34.0 TO 34.9 IN ADULT: ICD-10-CM

## 2025-02-21 DIAGNOSIS — I10 ESSENTIAL HYPERTENSION: ICD-10-CM

## 2025-02-21 RX ORDER — TOPIRAMATE 50 MG/1
50 TABLET, FILM COATED ORAL 2 TIMES DAILY
Qty: 180 TABLET | Refills: 0 | Status: SHIPPED | OUTPATIENT
Start: 2025-02-21

## 2025-02-21 RX ORDER — HYDRALAZINE HYDROCHLORIDE 50 MG/1
50 TABLET, FILM COATED ORAL 3 TIMES DAILY
Qty: 270 TABLET | Refills: 0 | Status: SHIPPED | OUTPATIENT
Start: 2025-02-21

## 2025-02-24 NOTE — PROGRESS NOTES
"Electrophysiology Office Visit       CHIEF COMPLAINT  Chief Complaint   Patient presents with    Follow-up     Pt is here today following up after  6 months         Primary EP doctor: Dr Ferrer  Primary Cardiologist:    EP History: AF dx 2019  - 9/26/19-9/28/19 Admitted to [facility] w/ AFib w/ RVR, started on IV Cardizem, Started on metoprolol tartrate 50 mg BID and Eliquis 5mg BID  - 9/27/19 DCCV (Dr Young) 2/2 AF-successful  - 10/6/19-10/8/19 Admitted to [facility] w/ recurrence of AF w/ RVR. IV Cardizem. Started on flecainide 100mg BID.    - 4/28/21 EP OV noted to have widening QRS (142ms from avg 80-90ms) and new RBBB, along with increased arrhythmic burden. Recommended to change antiarrthymics and she chose dofetilide due to insurance coverage.   - 05/24/21-5/26/21 Admitted [facility] for Dofetilide drug loading. Discharge on Dofetilide 250mg BID, metoprolol succinate  12.5mg QD, Eliquis 5mg BID  - 07/04/21-7/5/21 Admitted [facility] recurrence of AF w/ RVR. IV Cardizem. Added MgOx 400mg QD  - 12/30/21-1/29/22; Event monitor- 30 days; \"There were over 40 transmissions performed during this study. All of them patient activated, no associated symptoms. All of the episodes except for 10 of them, were related with sinus rhythm “sinus rhythm with PACs or PVCs. 10 episodes were related with sinus rhythm with brief episodes of supraventricular tachycardia. Cannot rule out brief episodes of atrial tachycardia versus atrial fibrillation. Longest episode lasted 14 seconds rates between 107-120 bpm. No sustained episodes of atrial arrhythmias were seen during this study associated with transmissions.\"    - 2/19/23 Admitted [facility] w/ AF w/ RVR, increased home metoprolol succinate 50mg QD  - 3/15/2023 EP OV decreased metoprolol succinate to 25mg 2/2 fatigue/bradycardia  - 4/3/2023 Holter-24hr; Underlying rhythm was sinus rhythm, Range  bpm, Avg 75bpm,  occasional PACs/PVCs. No sustained ventricular arrhythmias.  Brief episodes of nonsustained SVT. " No significant pauses or evidence of high-grade AV blocks.  - 6/29/23-7/1/23 Admitted [facility] w/ AF RVR, IV cardizem, increased home Dofetilide 500mg BID  Controlled on Dofetilide 500mg BID, Metoprolol succinate 25mg HS, Eliquis 5mg BID. HTN meds Hydralazine 50mg QD, Valsartan 320mg QD       HPI: 2/26/2025  In the last month she had a stressful event with daughter and noticed her heart racing. But she sat down and relaxed and recovered well. Overall doing well. Denies cp, sob, light headedness or dizziness.   She has lost weight by semaglutide, diet, and exercise. Since 2023 she weighed around 200 lbs and she now she is 170lbs and maintaining.   No bleeding issues with the Eliquis.     Today's EKG Interpretation: NSR, rate 60bpm, RBBB, QRS 122ms.     CREATININE 0.89 09/25/2024     3/1/23 ECHO   CONCLUSIONS:  1. Left ventricular systolic function is hyperdynamic with a 65-70% estimated ejection fraction.  2. Hyperdynamic LV. There is slight increased outflow tract velocity at 1.3 but without evidence of focal outflow tract obstruction or S.A.M. LV wall thickness upper normal. Diastolic function indeterminate.  3. Upper normal size RV with normal RV systolic function. Normal RVSP 25 mmHg.  4. 1+ mitral regurgitation due to thickening of mitral valve leaflets.  5. Normal aortic valve.    9/27/19 Heart catheterization   CONCLUSIONS:   1. Normal coronary arteries.   2. Normal left ventricular systolic function.   3. Recommendation medical management.    VITALS  Vitals:    02/26/25 1013   BP: 114/80   Pulse: 60     Wt Readings from Last 4 Encounters:   02/26/25 77.1 kg (170 lb)   12/13/24 75.3 kg (166 lb)   12/13/24 77.6 kg (171 lb)   10/03/24 75.3 kg (166 lb)     PHYSICAL EXAM:  GENERAL:  Well developed, well nourished, in no acute distress.  NEURO/PSYCH:  No focal deficits. A&O x 3   LUNGS:  Clear to auscultation bilaterally. No wheezing, rhonci, or crackles  HEART:  RRR, no M/R/G.   EXTREMITIES:  Warm with good color, no  clubbing or cyanosis.  No pitting edema. Left fore foot swelling from lyphedema    Past Medical History:   Diagnosis Date    Abnormal Pap smear of cervix     Atrial fibrillation (Multi)     Breast cancer (Multi)     right breast cancer 5/22    Cervicalgia 09/21/2021    Neck pain, chronic    Corns and callosities 03/25/2022    Corn of foot    Encounter for screening for malignant neoplasm of colon 08/17/2020    Colon cancer screening    HPV (human papilloma virus) infection     Hypertension     Ingrowing nail 11/15/2022    Ingrown toenail    Low back pain, unspecified 08/13/2020    Acute right-sided low back pain without sciatica    Meralgia paresthetica, left lower limb 08/13/2020    Meralgia paresthetica of left side    Migraine     Obesity, unspecified 08/31/2022    Class 1 obesity with body mass index (BMI) of 32.0 to 32.9 in adult    Otalgia, left ear 04/20/2020    Left ear pain    Other chronic allergic conjunctivitis 10/06/2021    Other chronic allergic conjunctivitis of both eyes    Other conditions influencing health status     Back sprain    Other conditions influencing health status 02/10/2022    History of cough    Other instability, right ankle 04/21/2021    Other instability, right ankle    Other obesity due to excess calories 06/14/2022    Class 1 obesity due to excess calories with serious comorbidity and body mass index (BMI) of 32.0 to 32.9 in adult    Other specified soft tissue disorders 05/27/2021    Swelling of left foot    Otitis media, unspecified, left ear 04/20/2020    Infection of left ear    Pain in left leg 03/13/2021    Pain of left lower extremity    Pain in unspecified ankle and joints of unspecified foot 04/21/2021    Ankle pain    Pain in unspecified hip 09/21/2022    Hip pain, acute    Personal history of diseases of the skin and subcutaneous tissue 12/07/2021    History of dermatitis    Personal history of irradiation     Personal history of other diseases of the circulatory system  09/14/2022    History of sinoatrial node dysfunction    Personal history of other diseases of the circulatory system 12/01/2021    History of atrial fibrillation    Personal history of other diseases of the digestive system 03/25/2022    History of constipation    Personal history of other diseases of the female genital tract     History of abnormal cervical Papanicolaou smear    Personal history of other diseases of the respiratory system 02/10/2022    History of sinusitis    Personal history of other diseases of the respiratory system 08/05/2022    History of paranasal sinus congestion    Personal history of other drug therapy 01/18/2022    History of pneumococcal vaccination    Personal history of other endocrine, nutritional and metabolic disease 12/01/2021    History of obesity    Personal history of other medical treatment 10/08/2019    History of screening mammography    Personal history of other specified conditions 11/17/2021    History of edema    Personal history of other specified conditions 09/14/2022    History of palpitations    Personal history of other specified conditions 07/15/2021    History of fatigue    Personal history of other specified conditions 03/25/2022    History of abdominal pain    Personal history of other specified conditions 08/31/2022    History of postnasal drip    Plantar fascial fibromatosis     Plantar fasciitis    Posterior tibial tendinitis, left leg 03/13/2021    Tibialis tendinitis of left lower extremity    Sleep apnea 2021    Spontaneous ecchymoses 11/17/2021    Petechiae    Strain of other muscle(s) and tendon(s) at lower leg level, right leg, initial encounter 06/18/2020    Strain of right calf muscle       Past Surgical History:   Procedure Laterality Date    ADENOIDECTOMY  Age 6    APPENDECTOMY  03/14/2016    Appendectomy    BREAST BIOPSY      BREAST LUMPECTOMY      BUNIONECTOMY  03/14/2016    Simple Bunion Exostectomy ([facility])    CERVICAL BIOPSY  W/ LOOP  ELECTRODE EXCISION      COLPOSCOPY      OTHER SURGICAL HISTORY  11/17/2021    Tonsillectomy with adenoidectomy    OTHER SURGICAL HISTORY  11/17/2021    Bunionectomy    OTHER SURGICAL HISTORY  11/17/2021    Cervical loop electrosurgical excision    OTHER SURGICAL HISTORY  06/22/2022    Lumpectomy       Social History     Tobacco Use    Smoking status: Never    Smokeless tobacco: Never   Vaping Use    Vaping status: Never Used   Substance Use Topics    Alcohol use: Not Currently     Comment: once or twice a month    Drug use: Never       Family History   Problem Relation Name Age of Onset    Hypertension Mother C     Stroke Father Wilberto     Hypertension Father Wilberto     Atrial fibrillation Father Wilberto     Heart disease Father Wilberto     Breast cancer Mother's Sister Nelia        Allergies   Allergen Reactions    Lisinopril Cough        Current Outpatient Medications   Medication Instructions    artificial tears, dextran-hypomel-glycerin, 0.1-0.3-0.2 % ophthalmic solution 1 drop, As needed    calcium carbonate 600 mg calcium (1,500 mg) tablet 1 tablet, Daily    cholecalciferol (Vitamin D-3) 50 mcg (2,000 unit) capsule 2 capsules, Daily    docusate sodium (Colace) 100 mg capsule 2 capsules, Daily    dofetilide (TIKOSYN) 500 mcg, oral, 2 times daily    Eliquis 5 mg, oral, 2 times daily    hydrALAZINE (APRESOLINE) 50 mg, oral, 3 times daily    loratadine (CLARITIN) 10 mg, Daily    melatonin 2 mg, Nightly    metaxalone (SKELAXIN) 800 mg, Daily PRN    methocarbamol (ROBAXIN) 500 mg    metoprolol succinate XL (TOPROL-XL) 25 mg, oral, Nightly    miconazole (Lotrimin AF) 2 % powder Topical, 2 times daily    mometasone (Elocon) 0.1 % cream Apply to under breast and scaly areas of scalp twice a day as needed    multivitamin with minerals (multivitamin-iron-folic acid) tablet 1 tablet, Daily    ondansetron ODT (ZOFRAN-ODT) 4 mg, oral, 3 times daily PRN    Ozempic 2 mg, Once Weekly    rosuvastatin (CRESTOR) 5 mg, oral, Daily     topiramate (TOPAMAX) 50 mg, oral, 2 times daily    Ubrelvy 100 mg, oral, As needed    valsartan (DIOVAN) 320 mg, oral, Daily      Cardiac studies:   9/27/19 Heart catheterization   CONCLUSIONS:   1. Normal coronary arteries.   2. Normal left ventricular systolic function.   3. Recommendation medical management.    3/1/23 ECHO   CONCLUSIONS:  1. Left ventricular systolic function is hyperdynamic with a 65-70% estimated ejection fraction.  2. Hyperdynamic LV. There is slight increased outflow tract velocity at 1.3 but without evidence of focal outflow tract obstruction or S.A.M. LV wall thickness upper normal. Diastolic function indeterminate.  3. Upper normal size RV with normal RV systolic function. Normal RVSP 25 mmHg.  4. 1+ mitral regurgitation due to thickening of mitral valve leaflets.  5. Normal aortic valve.    Patient Active Problem List   Diagnosis    Ankle pain    Anxiety disorder due to known physiological condition    Carpal tunnel syndrome of left wrist    Ductal carcinoma in situ (DCIS) of right breast    Essential hypertension    Flat foot    Hyperlipidemia    Lung nodules    Lymphedema of both lower extremities    Migraines    Neck pain, chronic    Obstructive sleep apnea    Osteoarthritis of ankle, right    Paroxysmal atrial fibrillation with RVR (Multi)    Sinus node dysfunction (Multi)    Venous insufficiency    Chronic rhinitis      ASSESSMENT AND PLAN  Problem List Items Addressed This Visit       Paroxysmal atrial fibrillation with RVR (Multi) - Primary  Patient's rhythm and rate have been controlled.  Patient overall has been asymptomatic.  Continue with antiarrhythmic therapy and Eliquis.  Follow-up with Dr. Ferrer in 6 months.    Relevant Medications    dofetilide (Tikosyn) 500 mcg capsule    Eliquis 5 mg tablet    metoprolol succinate XL (Toprol-XL) 25 mg 24 hr tablet    Other Relevant Orders    ECG 12 lead (Clinic Performed)        EFREN Pop, PALeonC

## 2025-02-26 ENCOUNTER — APPOINTMENT (OUTPATIENT)
Dept: CARDIOLOGY | Facility: CLINIC | Age: 60
End: 2025-02-26
Payer: COMMERCIAL

## 2025-02-26 VITALS
DIASTOLIC BLOOD PRESSURE: 80 MMHG | SYSTOLIC BLOOD PRESSURE: 114 MMHG | BODY MASS INDEX: 28.32 KG/M2 | HEIGHT: 65 IN | HEART RATE: 60 BPM | WEIGHT: 170 LBS

## 2025-02-26 DIAGNOSIS — I48.0 PAROXYSMAL ATRIAL FIBRILLATION WITH RVR (MULTI): Primary | ICD-10-CM

## 2025-02-26 PROCEDURE — 3079F DIAST BP 80-89 MM HG: CPT | Performed by: PHYSICIAN ASSISTANT

## 2025-02-26 PROCEDURE — 1036F TOBACCO NON-USER: CPT | Performed by: PHYSICIAN ASSISTANT

## 2025-02-26 PROCEDURE — 3074F SYST BP LT 130 MM HG: CPT | Performed by: PHYSICIAN ASSISTANT

## 2025-02-26 PROCEDURE — 93000 ELECTROCARDIOGRAM COMPLETE: CPT | Performed by: INTERNAL MEDICINE

## 2025-02-26 PROCEDURE — 99214 OFFICE O/P EST MOD 30 MIN: CPT | Performed by: PHYSICIAN ASSISTANT

## 2025-02-26 PROCEDURE — 3008F BODY MASS INDEX DOCD: CPT | Performed by: PHYSICIAN ASSISTANT

## 2025-02-26 RX ORDER — APIXABAN 5 MG/1
5 TABLET, FILM COATED ORAL 2 TIMES DAILY
Qty: 180 TABLET | Refills: 3 | Status: SHIPPED | OUTPATIENT
Start: 2025-02-26 | End: 2026-02-26

## 2025-02-26 RX ORDER — DOFETILIDE 0.5 MG/1
500 CAPSULE ORAL 2 TIMES DAILY
Qty: 180 CAPSULE | Refills: 3 | Status: SHIPPED | OUTPATIENT
Start: 2025-02-26 | End: 2026-02-26

## 2025-02-26 RX ORDER — METOPROLOL SUCCINATE 25 MG/1
25 TABLET, EXTENDED RELEASE ORAL NIGHTLY
Qty: 90 TABLET | Refills: 3 | Status: SHIPPED | OUTPATIENT
Start: 2025-02-26 | End: 2026-02-26

## 2025-02-26 NOTE — PATIENT INSTRUCTIONS
Great to see you today.   Please take your medications and or do life style behavior modifications as discussed.   Please make appointment in 6 months with Dr Ferrer  Please call if you have any questions or concerns.  Please go to emergency department if you have abrupt onset of chest, shortness of breath, light headedness or dizziness.

## 2025-02-28 PROCEDURE — RXMED WILLOW AMBULATORY MEDICATION CHARGE

## 2025-03-03 ENCOUNTER — OFFICE VISIT (OUTPATIENT)
Dept: PRIMARY CARE | Facility: CLINIC | Age: 60
End: 2025-03-03
Payer: COMMERCIAL

## 2025-03-03 VITALS
BODY MASS INDEX: 28.39 KG/M2 | DIASTOLIC BLOOD PRESSURE: 82 MMHG | HEART RATE: 59 BPM | SYSTOLIC BLOOD PRESSURE: 130 MMHG | RESPIRATION RATE: 16 BRPM | TEMPERATURE: 96.9 F | HEIGHT: 65 IN | OXYGEN SATURATION: 99 % | WEIGHT: 170.4 LBS

## 2025-03-03 DIAGNOSIS — N39.0 URINARY TRACT INFECTION WITHOUT HEMATURIA, SITE UNSPECIFIED: Primary | ICD-10-CM

## 2025-03-03 DIAGNOSIS — J30.2 SEASONAL ALLERGIES: ICD-10-CM

## 2025-03-03 PROBLEM — M25.579 ANKLE PAIN: Status: RESOLVED | Noted: 2023-03-15 | Resolved: 2025-03-03

## 2025-03-03 LAB
POC APPEARANCE, URINE: CLEAR
POC BILIRUBIN, URINE: NEGATIVE
POC BLOOD, URINE: NEGATIVE
POC COLOR, URINE: YELLOW
POC GLUCOSE, URINE: NEGATIVE MG/DL
POC KETONES, URINE: NEGATIVE MG/DL
POC LEUKOCYTES, URINE: ABNORMAL
POC NITRITE,URINE: NEGATIVE
POC PH, URINE: 7.5 PH
POC PROTEIN, URINE: NEGATIVE MG/DL
POC SPECIFIC GRAVITY, URINE: 1.01
POC UROBILINOGEN, URINE: 0.2 EU/DL

## 2025-03-03 PROCEDURE — 3079F DIAST BP 80-89 MM HG: CPT | Performed by: PHYSICIAN ASSISTANT

## 2025-03-03 PROCEDURE — 99213 OFFICE O/P EST LOW 20 MIN: CPT | Performed by: PHYSICIAN ASSISTANT

## 2025-03-03 PROCEDURE — 1036F TOBACCO NON-USER: CPT | Performed by: PHYSICIAN ASSISTANT

## 2025-03-03 PROCEDURE — 81003 URINALYSIS AUTO W/O SCOPE: CPT | Performed by: PHYSICIAN ASSISTANT

## 2025-03-03 PROCEDURE — 3075F SYST BP GE 130 - 139MM HG: CPT | Performed by: PHYSICIAN ASSISTANT

## 2025-03-03 PROCEDURE — 3008F BODY MASS INDEX DOCD: CPT | Performed by: PHYSICIAN ASSISTANT

## 2025-03-03 RX ORDER — NITROFURANTOIN 25; 75 MG/1; MG/1
100 CAPSULE ORAL
Qty: 14 CAPSULE | Refills: 0 | Status: SHIPPED | OUTPATIENT
Start: 2025-03-03 | End: 2025-03-10

## 2025-03-03 ASSESSMENT — ENCOUNTER SYMPTOMS
GASTROINTESTINAL NEGATIVE: 1
PSYCHIATRIC NEGATIVE: 1
EYE DISCHARGE: 1
EYE ITCHING: 1
DYSURIA: 1
NEUROLOGICAL NEGATIVE: 1
CONSTITUTIONAL NEGATIVE: 1
CARDIOVASCULAR NEGATIVE: 1
RESPIRATORY NEGATIVE: 1
MUSCULOSKELETAL NEGATIVE: 1

## 2025-03-03 NOTE — ASSESSMENT & PLAN NOTE
- Recommend she restart her Claritin for her allergic conjuctivitis  - H/o nosebleeds on antihistamines so recommend preventative nasal vasoline

## 2025-03-03 NOTE — PROGRESS NOTES
"Subjective   Patient ID: Aimee Ponce is a 59 y.o. female who presents for Eye Drainage (Right eye discharge started today. Difficulty to open her eyes she does state that she puts on eye drops the night before for dry eyes. ) and UTI (Pressure, burning, urgency to go and no odor. Pt states she hasn't been drinking enough water. ).    HPI     Right eye discharge:   - dry eye and crusting this morning   - used artificial tears and sxs improved  - she usually starts taking Clartin this time of year, is going to restart it     UTI sxs:   - Onset: this morning   - Associated sxs: dysuria, frequency, urgency, feeling of incomplete voiding  - Denies fever, abdominal or flank pain, N/V, gross hematuria  - History of recurrent UTIs: no  - Recent UTI: no  - Recent Antibiotics:  no  - Diabetic: no    Review of Systems   Constitutional: Negative.    HENT: Negative.     Eyes:  Positive for discharge and itching.        Symptoms have since resolved    Respiratory: Negative.     Cardiovascular: Negative.    Gastrointestinal: Negative.    Genitourinary:  Positive for dysuria and urgency.   Musculoskeletal: Negative.    Skin: Negative.    Allergic/Immunologic: Positive for environmental allergies.   Neurological: Negative.    Psychiatric/Behavioral: Negative.         Objective   /82   Pulse 59   Temp 36.1 °C (96.9 °F) (Temporal)   Resp 16   Ht 1.651 m (5' 5\")   Wt 77.3 kg (170 lb 6.4 oz)   SpO2 99%   BMI 28.36 kg/m²     Physical Exam  Constitutional:       Appearance: Normal appearance.   HENT:      Right Ear: Tympanic membrane normal.      Left Ear: Tympanic membrane normal.      Nose: Nose normal.      Mouth/Throat:      Mouth: Mucous membranes are moist.      Pharynx: Oropharynx is clear.   Eyes:      Conjunctiva/sclera: Conjunctivae normal.      Pupils: Pupils are equal, round, and reactive to light.   Pulmonary:      Effort: Pulmonary effort is normal.   Skin:     General: Skin is warm and dry.   Neurological: "      General: No focal deficit present.      Mental Status: She is alert and oriented to person, place, and time.   Psychiatric:         Mood and Affect: Mood normal.         Behavior: Behavior normal.         Thought Content: Thought content normal.         Judgment: Judgment normal.         Assessment/Plan     Problem List Items Addressed This Visit          ENT    Seasonal allergies    Current Assessment & Plan     - Recommend she restart her Claritin for her allergic conjuctivitis  - H/o nosebleeds on antihistamines so recommend preventative nasal vasoline          Other Visit Diagnoses       Urinary tract infection without hematuria, site unspecified    -  Primary    Relevant Medications    nitrofurantoin, macrocrystal-monohydrate, (Macrobid) 100 mg capsule    Other Relevant Orders    POCT UA Automated manually resulted (Completed)    Urine Culture          For the UTI, start Macrobid. Discussed risks of abx including GI side effects/ diarrhea. Follow up if any of these concerns. Educated about increasing fluid intake and follow up if sxs worsen despite tx   Will check culture and sensitivity     I was present with the PA student (Alison Phipps from [facility]) who participated in the documentation of this note. I have personally seen and re-examined the patient and performed the medical decision-making components (assessment and plan of care). I have reviewed the PA student documentation and verified the findings in the note as written with additions or exceptions as stated in the body of this note.    Glenna Herman PA-C

## 2025-03-04 ENCOUNTER — PHARMACY VISIT (OUTPATIENT)
Dept: PHARMACY | Facility: CLINIC | Age: 60
End: 2025-03-04
Payer: COMMERCIAL

## 2025-03-05 LAB — BACTERIA UR CULT: NORMAL

## 2025-03-06 DIAGNOSIS — E78.5 HYPERLIPIDEMIA, UNSPECIFIED HYPERLIPIDEMIA TYPE: ICD-10-CM

## 2025-03-06 RX ORDER — ROSUVASTATIN CALCIUM 5 MG/1
5 TABLET, COATED ORAL DAILY
Qty: 90 TABLET | Refills: 3 | Status: SHIPPED | OUTPATIENT
Start: 2025-03-06

## 2025-03-06 NOTE — TELEPHONE ENCOUNTER
Received request for prescription refills for patient.   Patient follows with Dr. Ferrer    Request is for rosuvastatin 5mg once daily  Is patient currently on medication yes    Last OV 02/26/2025  Next OV 08/27/2025    Pended for signing and sent to provider

## 2025-03-07 ENCOUNTER — OFFICE VISIT (OUTPATIENT)
Dept: URGENT CARE | Age: 60
End: 2025-03-07
Payer: COMMERCIAL

## 2025-03-07 VITALS
HEIGHT: 65 IN | OXYGEN SATURATION: 99 % | HEART RATE: 62 BPM | WEIGHT: 170 LBS | TEMPERATURE: 97.8 F | SYSTOLIC BLOOD PRESSURE: 122 MMHG | RESPIRATION RATE: 18 BRPM | BODY MASS INDEX: 28.32 KG/M2 | DIASTOLIC BLOOD PRESSURE: 86 MMHG

## 2025-03-07 DIAGNOSIS — I10 ESSENTIAL HYPERTENSION: ICD-10-CM

## 2025-03-07 DIAGNOSIS — S00.411A ABRASION OF RIGHT EAR CANAL, INITIAL ENCOUNTER: ICD-10-CM

## 2025-03-07 DIAGNOSIS — H68.001: Primary | ICD-10-CM

## 2025-03-07 PROCEDURE — 99213 OFFICE O/P EST LOW 20 MIN: CPT

## 2025-03-07 PROCEDURE — 99070 SPECIAL SUPPLIES PHYS/QHP: CPT

## 2025-03-07 PROCEDURE — 3074F SYST BP LT 130 MM HG: CPT

## 2025-03-07 PROCEDURE — 3079F DIAST BP 80-89 MM HG: CPT

## 2025-03-07 PROCEDURE — 3008F BODY MASS INDEX DOCD: CPT

## 2025-03-07 PROCEDURE — 1036F TOBACCO NON-USER: CPT

## 2025-03-07 RX ORDER — FLUTICASONE PROPIONATE 50 MCG
1 SPRAY, SUSPENSION (ML) NASAL 2 TIMES DAILY
Qty: 1 G | Refills: 0 | Status: SHIPPED | OUTPATIENT
Start: 2025-03-07 | End: 2025-04-06

## 2025-03-07 ASSESSMENT — ENCOUNTER SYMPTOMS
WHEEZING: 0
HEADACHES: 0
RHINORRHEA: 1
COUGH: 0
ABDOMINAL PAIN: 0
NAUSEA: 1
SINUS PRESSURE: 0
CHILLS: 0
FEVER: 0
NECK PAIN: 0
SORE THROAT: 0
VOMITING: 0
STRIDOR: 0
DIARRHEA: 0
SINUS PAIN: 0
FATIGUE: 0

## 2025-03-07 ASSESSMENT — PAIN SCALES - GENERAL: PAINLEVEL_OUTOF10: 3

## 2025-03-07 NOTE — PATIENT INSTRUCTIONS
Diagnosis: Eustachian Tube Dysfunction (ETD)    Treatment:    Decongestants: If recommended, take decongestants as prescribed to help reduce swelling and open the Eustachian tube.  Nasal Saline Spray: Using a saline spray or nasal rinse can help clear nasal passages and relieve pressure.  Warm Compress: Apply a warm compress over the ears to help with discomfort.  Avoid Sudden Pressure Changes: Try to avoid sudden changes in altitude (e.g., flying or diving), as this can worsen symptoms.  Hydration: Drink plenty of water to help thin mucus and improve drainage.  What to Expect:    The feeling of fullness or pressure in the ears may persist for a few days to a couple of weeks.  You may experience muffled hearing or popping sounds in your ears.  When to Seek Help:    If you experience severe pain, fever, or drainage from the ear.  If your symptoms last longer than 2-3 weeks or worsen over time.  Follow-Up Care: If symptoms do not improve or worsen, follow up with your healthcare provider.

## 2025-03-07 NOTE — PROGRESS NOTES
Subjective   Patient ID: Aimee Ponce is a 59 y.o. female. They present today with a chief complaint of Earache.    History of Present Illness  59 year old female presents with complaint of right ear itching and intermittent pain since waking this morning. Has not taken medications or tried any treatments for this complaint.         Earache   There is pain in the right ear. This is a new problem. The current episode started today. The problem occurs every few minutes. The problem has been unchanged. There has been no fever. The pain is mild. Associated symptoms include rhinorrhea. Pertinent negatives include no abdominal pain, coughing, diarrhea, ear discharge, headaches, hearing loss, neck pain, rash, sore throat or vomiting. She has tried nothing for the symptoms.       Past Medical History  Allergies as of 03/07/2025 - Reviewed 03/07/2025   Allergen Reaction Noted    Lisinopril Cough 03/15/2023       (Not in a hospital admission)       Past Medical History:   Diagnosis Date    Abnormal Pap smear of cervix     Atrial fibrillation (Multi)     Breast cancer (Multi)     right breast cancer 5/22    Cervicalgia 09/21/2021    Neck pain, chronic    Corns and callosities 03/25/2022    Corn of foot    Encounter for screening for malignant neoplasm of colon 08/17/2020    Colon cancer screening    HPV (human papilloma virus) infection     Hypertension     Ingrowing nail 11/15/2022    Ingrown toenail    Low back pain, unspecified 08/13/2020    Acute right-sided low back pain without sciatica    Meralgia paresthetica, left lower limb 08/13/2020    Meralgia paresthetica of left side    Migraine     Obesity, unspecified 08/31/2022    Class 1 obesity with body mass index (BMI) of 32.0 to 32.9 in adult    Otalgia, left ear 04/20/2020    Left ear pain    Other chronic allergic conjunctivitis 10/06/2021    Other chronic allergic conjunctivitis of both eyes    Other conditions influencing health status     Back sprain    Other  conditions influencing health status 02/10/2022    History of cough    Other instability, right ankle 04/21/2021    Other instability, right ankle    Other obesity due to excess calories 06/14/2022    Class 1 obesity due to excess calories with serious comorbidity and body mass index (BMI) of 32.0 to 32.9 in adult    Other specified soft tissue disorders 05/27/2021    Swelling of left foot    Otitis media, unspecified, left ear 04/20/2020    Infection of left ear    Pain in left leg 03/13/2021    Pain of left lower extremity    Pain in unspecified ankle and joints of unspecified foot 04/21/2021    Ankle pain    Pain in unspecified hip 09/21/2022    Hip pain, acute    Personal history of diseases of the skin and subcutaneous tissue 12/07/2021    History of dermatitis    Personal history of irradiation     Personal history of other diseases of the circulatory system 09/14/2022    History of sinoatrial node dysfunction    Personal history of other diseases of the circulatory system 12/01/2021    History of atrial fibrillation    Personal history of other diseases of the digestive system 03/25/2022    History of constipation    Personal history of other diseases of the female genital tract     History of abnormal cervical Papanicolaou smear    Personal history of other diseases of the respiratory system 02/10/2022    History of sinusitis    Personal history of other diseases of the respiratory system 08/05/2022    History of paranasal sinus congestion    Personal history of other drug therapy 01/18/2022    History of pneumococcal vaccination    Personal history of other endocrine, nutritional and metabolic disease 12/01/2021    History of obesity    Personal history of other medical treatment 10/08/2019    History of screening mammography    Personal history of other specified conditions 11/17/2021    History of edema    Personal history of other specified conditions 09/14/2022    History of palpitations    Personal  history of other specified conditions 07/15/2021    History of fatigue    Personal history of other specified conditions 03/25/2022    History of abdominal pain    Personal history of other specified conditions 08/31/2022    History of postnasal drip    Plantar fascial fibromatosis     Plantar fasciitis    Posterior tibial tendinitis, left leg 03/13/2021    Tibialis tendinitis of left lower extremity    Sleep apnea 2021    Spontaneous ecchymoses 11/17/2021    Petechiae    Strain of other muscle(s) and tendon(s) at lower leg level, right leg, initial encounter 06/18/2020    Strain of right calf muscle       Past Surgical History:   Procedure Laterality Date    ADENOIDECTOMY  Age 6    APPENDECTOMY  03/14/2016    Appendectomy    BREAST BIOPSY      BREAST LUMPECTOMY      BUNIONECTOMY  03/14/2016    Simple Bunion Exostectomy ([facility])    CERVICAL BIOPSY  W/ LOOP ELECTRODE EXCISION      COLPOSCOPY      OTHER SURGICAL HISTORY  11/17/2021    Tonsillectomy with adenoidectomy    OTHER SURGICAL HISTORY  11/17/2021    Bunionectomy    OTHER SURGICAL HISTORY  11/17/2021    Cervical loop electrosurgical excision    OTHER SURGICAL HISTORY  06/22/2022    Lumpectomy        reports that she has never smoked. She has never used smokeless tobacco. She reports current alcohol use. She reports that she does not use drugs.    Review of Systems  Review of Systems   Constitutional:  Negative for chills, fatigue and fever.   HENT:  Positive for ear pain, postnasal drip and rhinorrhea. Negative for congestion, ear discharge, hearing loss, sinus pressure, sinus pain, sore throat and tinnitus.    Respiratory:  Negative for cough, wheezing and stridor.    Cardiovascular:  Negative for chest pain.   Gastrointestinal:  Positive for nausea. Negative for abdominal pain, diarrhea and vomiting.   Musculoskeletal:  Negative for neck pain.   Skin:  Negative for rash.   Neurological:  Negative for headaches.   All other systems reviewed and are  "negative.        Objective    Vitals:    03/07/25 1205 03/07/25 1225   BP: 151/90 122/86   Pulse: 62    Resp: 18    Temp: 36.6 °C (97.8 °F)    TempSrc: Oral    SpO2: 99%    Weight: 77.1 kg (170 lb)    Height: 1.651 m (5' 5\")      No LMP recorded. Patient is postmenopausal.    Physical Exam  Vitals and nursing note reviewed.   Constitutional:       General: She is not in acute distress.     Appearance: Normal appearance. She is normal weight. She is not ill-appearing.   HENT:      Head: Normocephalic.      Right Ear: Tympanic membrane, ear canal and external ear normal.      Left Ear: Tympanic membrane, ear canal and external ear normal.      Ears:        Comments: 2mm abrasion noted posterior upper external ear canal with mild dry flaking skin surrounding the area. No erythema, edema or drainage.      Nose: Nose normal. No congestion.      Mouth/Throat:      Mouth: Mucous membranes are moist.      Pharynx: Oropharynx is clear.   Eyes:      Pupils: Pupils are equal, round, and reactive to light.   Cardiovascular:      Rate and Rhythm: Normal rate and regular rhythm.      Pulses: Normal pulses.      Heart sounds: Normal heart sounds. No murmur heard.  Pulmonary:      Effort: Pulmonary effort is normal.      Breath sounds: Normal breath sounds. No wheezing or rhonchi.   Musculoskeletal:         General: Normal range of motion.      Cervical back: Normal range of motion and neck supple.   Lymphadenopathy:      Cervical: No cervical adenopathy.   Skin:     General: Skin is warm.   Neurological:      Mental Status: She is alert and oriented to person, place, and time.   Psychiatric:         Mood and Affect: Mood normal.         Behavior: Behavior normal.         Procedures    Point of Care Test & Imaging Results from this visit  No results found for this visit on 03/07/25.   No results found.    Diagnostic study results (if any) were reviewed by YOLANDE Joya.    Assessment/Plan   Allergies, medications, " history, and pertinent labs/EKGs/Imaging reviewed by YOLANDE Joya.     Medical Decision Making   Signs and symptoms consistent with Eustachian tube dysfunction likely secondary to environmental allergies or barometric pressure changes. No evidence of sinusitis, strep pharyngitis, otitis media, or other acute infective process. Advise OTC Flonase, decongestants, and supportive measures. Follow up if symptoms change, worsen, or do not improve. Patient verbalized understanding and agreeable with plan of care.     Orders and Diagnoses  Diagnoses and all orders for this visit:  Salpingitis, Eustachian tube, right  -     fluticasone (Flonase Allergy Relief) 50 mcg/actuation nasal spray; Administer 1 spray into each nostril 2 times a day. Shake gently. Before first use, prime pump. After use, clean tip and replace cap.  Abrasion of right ear canal, initial encounter  Essential hypertension      Medical Admin Record      Patient disposition: Home    Electronically signed by YOLANDE Joya  12:46 PM

## 2025-03-26 ENCOUNTER — TELEPHONE (OUTPATIENT)
Dept: PRIMARY CARE | Facility: CLINIC | Age: 60
End: 2025-03-26
Payer: COMMERCIAL

## 2025-03-26 LAB
ALBUMIN SERPL-MCNC: 4.4 G/DL (ref 3.6–5.1)
ALP SERPL-CCNC: 67 U/L (ref 37–153)
ALT SERPL-CCNC: 22 U/L (ref 6–29)
ANION GAP SERPL CALCULATED.4IONS-SCNC: 8 MMOL/L (CALC) (ref 7–17)
AST SERPL-CCNC: 16 U/L (ref 10–35)
BASOPHILS # BLD AUTO: 31 CELLS/UL (ref 0–200)
BASOPHILS NFR BLD AUTO: 0.7 %
BILIRUB SERPL-MCNC: 0.5 MG/DL (ref 0.2–1.2)
BUN SERPL-MCNC: 17 MG/DL (ref 7–25)
CALCIUM SERPL-MCNC: 9.2 MG/DL (ref 8.6–10.4)
CHLORIDE SERPL-SCNC: 109 MMOL/L (ref 98–110)
CHOLEST SERPL-MCNC: 183 MG/DL
CHOLEST/HDLC SERPL: 2.8 (CALC)
CO2 SERPL-SCNC: 25 MMOL/L (ref 20–32)
CREAT SERPL-MCNC: 0.78 MG/DL (ref 0.5–1.03)
EGFRCR SERPLBLD CKD-EPI 2021: 88 ML/MIN/1.73M2
EOSINOPHIL # BLD AUTO: 88 CELLS/UL (ref 15–500)
EOSINOPHIL NFR BLD AUTO: 2 %
ERYTHROCYTE [DISTWIDTH] IN BLOOD BY AUTOMATED COUNT: 12.8 % (ref 11–15)
GLUCOSE SERPL-MCNC: 96 MG/DL (ref 65–99)
HCT VFR BLD AUTO: 42.2 % (ref 35–45)
HDLC SERPL-MCNC: 66 MG/DL
HGB BLD-MCNC: 14.2 G/DL (ref 11.7–15.5)
LDLC SERPL CALC-MCNC: 99 MG/DL (CALC)
LYMPHOCYTES # BLD AUTO: 1148 CELLS/UL (ref 850–3900)
LYMPHOCYTES NFR BLD AUTO: 26.1 %
MAGNESIUM SERPL-MCNC: 2.3 MG/DL (ref 1.5–2.5)
MCH RBC QN AUTO: 31.8 PG (ref 27–33)
MCHC RBC AUTO-ENTMCNC: 33.6 G/DL (ref 32–36)
MCV RBC AUTO: 94.4 FL (ref 80–100)
MONOCYTES # BLD AUTO: 502 CELLS/UL (ref 200–950)
MONOCYTES NFR BLD AUTO: 11.4 %
NEUTROPHILS # BLD AUTO: 2631 CELLS/UL (ref 1500–7800)
NEUTROPHILS NFR BLD AUTO: 59.8 %
NONHDLC SERPL-MCNC: 117 MG/DL (CALC)
PLATELET # BLD AUTO: 264 THOUSAND/UL (ref 140–400)
PMV BLD REES-ECKER: 9.9 FL (ref 7.5–12.5)
POTASSIUM SERPL-SCNC: 4.1 MMOL/L (ref 3.5–5.3)
PROT SERPL-MCNC: 6.7 G/DL (ref 6.1–8.1)
RBC # BLD AUTO: 4.47 MILLION/UL (ref 3.8–5.1)
SODIUM SERPL-SCNC: 142 MMOL/L (ref 135–146)
TRIGL SERPL-MCNC: 88 MG/DL
TSH SERPL-ACNC: 0.91 MIU/L (ref 0.4–4.5)
WBC # BLD AUTO: 4.4 THOUSAND/UL (ref 3.8–10.8)

## 2025-04-03 ENCOUNTER — APPOINTMENT (OUTPATIENT)
Dept: PRIMARY CARE | Facility: CLINIC | Age: 60
End: 2025-04-03
Payer: COMMERCIAL

## 2025-04-03 VITALS
HEART RATE: 50 BPM | WEIGHT: 174 LBS | RESPIRATION RATE: 18 BRPM | BODY MASS INDEX: 28.99 KG/M2 | OXYGEN SATURATION: 97 % | TEMPERATURE: 97.3 F | SYSTOLIC BLOOD PRESSURE: 125 MMHG | HEIGHT: 65 IN | DIASTOLIC BLOOD PRESSURE: 78 MMHG

## 2025-04-03 DIAGNOSIS — I10 ESSENTIAL HYPERTENSION: ICD-10-CM

## 2025-04-03 DIAGNOSIS — Z00.00 ROUTINE GENERAL MEDICAL EXAMINATION AT A HEALTH CARE FACILITY: Primary | ICD-10-CM

## 2025-04-03 DIAGNOSIS — E66.3 OVERWEIGHT WITH BODY MASS INDEX (BMI) OF 28 TO 28.9 IN ADULT: ICD-10-CM

## 2025-04-03 DIAGNOSIS — E78.2 MIXED HYPERLIPIDEMIA: ICD-10-CM

## 2025-04-03 PROCEDURE — 99396 PREV VISIT EST AGE 40-64: CPT | Performed by: FAMILY MEDICINE

## 2025-04-03 PROCEDURE — 3078F DIAST BP <80 MM HG: CPT | Performed by: FAMILY MEDICINE

## 2025-04-03 PROCEDURE — 3008F BODY MASS INDEX DOCD: CPT | Performed by: FAMILY MEDICINE

## 2025-04-03 PROCEDURE — 1036F TOBACCO NON-USER: CPT | Performed by: FAMILY MEDICINE

## 2025-04-03 PROCEDURE — 3074F SYST BP LT 130 MM HG: CPT | Performed by: FAMILY MEDICINE

## 2025-04-03 RX ORDER — SEMAGLUTIDE 1.34 MG/ML
2 INJECTION, SOLUTION SUBCUTANEOUS
Qty: 2 ML | Refills: 11 | Status: SHIPPED | OUTPATIENT
Start: 2025-04-06

## 2025-04-03 RX ORDER — VALSARTAN 320 MG/1
320 TABLET ORAL DAILY
Qty: 90 TABLET | Refills: 3 | Status: SHIPPED | OUTPATIENT
Start: 2025-04-03

## 2025-04-03 ASSESSMENT — ENCOUNTER SYMPTOMS
HEADACHES: 0
SPEECH DIFFICULTY: 0
CHILLS: 0
FATIGUE: 1
BLOOD IN STOOL: 0
ACTIVITY CHANGE: 0
TREMORS: 0
NAUSEA: 0
EYE REDNESS: 0
PHOTOPHOBIA: 0
WEAKNESS: 0
WHEEZING: 0
SINUS PRESSURE: 0
POLYDIPSIA: 0
DYSPHORIC MOOD: 0
MYALGIAS: 0
SHORTNESS OF BREATH: 0
FLANK PAIN: 0
CHEST TIGHTNESS: 0
BACK PAIN: 0
FACIAL ASYMMETRY: 0
CHOKING: 0
CONFUSION: 0
DIZZINESS: 0
VOMITING: 0
PALPITATIONS: 0
LIGHT-HEADEDNESS: 0
FREQUENCY: 0
DIAPHORESIS: 0
EYE PAIN: 0
EYE DISCHARGE: 0
COUGH: 0
ARTHRALGIAS: 0
DYSURIA: 0
HALLUCINATIONS: 0
HEMATURIA: 0
UNEXPECTED WEIGHT CHANGE: 0
NUMBNESS: 0
VOICE CHANGE: 0
SEIZURES: 0
SORE THROAT: 0
ABDOMINAL DISTENTION: 0
CONSTIPATION: 0
JOINT SWELLING: 0
EYE ITCHING: 0
FEVER: 0
WOUND: 0
HYPERTENSION: 1
NECK STIFFNESS: 0
AGITATION: 0
RHINORRHEA: 0
ABDOMINAL PAIN: 0
NECK PAIN: 0
NERVOUS/ANXIOUS: 0
DIARRHEA: 0
APPETITE CHANGE: 0
SLEEP DISTURBANCE: 0
BRUISES/BLEEDS EASILY: 0
TROUBLE SWALLOWING: 0
ADENOPATHY: 0

## 2025-04-03 NOTE — PROGRESS NOTES
Subjective   Patient ID: Aimee Ponce is a 59 y.o. female who presents for 6 month check up (And review labs ), Tingling (In fingers ), and discuss mediction (Pt. Would like PCP to take over Ozempic ).    Subjective  Aimee Ponce is a 59 y.o. female and is here for a comprehensive physical exam. The patient reports wt gain.    Do you take any herbs or supplements that were not prescribed by a doctor? no  Are you taking calcium supplements? no  Are you taking aspirin daily? no      History:  LMP: No LMP recorded. Patient is postmenopausal.    Hypertension  Pertinent negatives include no chest pain, headaches, neck pain, palpitations or shortness of breath.   Hyperlipidemia  Pertinent negatives include no chest pain, myalgias or shortness of breath.       Review of Systems   Constitutional:  Positive for fatigue. Negative for activity change, appetite change, chills, diaphoresis, fever and unexpected weight change.   HENT:  Negative for congestion, ear pain, hearing loss, nosebleeds, postnasal drip, rhinorrhea, sinus pressure, sneezing, sore throat, tinnitus, trouble swallowing and voice change.    Eyes:  Negative for photophobia, pain, discharge, redness, itching and visual disturbance.   Respiratory:  Negative for cough, choking, chest tightness, shortness of breath and wheezing.    Cardiovascular:  Negative for chest pain, palpitations and leg swelling.   Gastrointestinal:  Negative for abdominal distention, abdominal pain, blood in stool, constipation, diarrhea, nausea and vomiting.   Endocrine: Negative for cold intolerance, heat intolerance, polydipsia and polyuria.   Genitourinary:  Negative for dysuria, flank pain, frequency, hematuria and urgency.   Musculoskeletal:  Negative for arthralgias, back pain, joint swelling, myalgias, neck pain and neck stiffness.   Skin:  Positive for rash. Negative for wound.   Allergic/Immunologic: Negative for immunocompromised state.   Neurological:  Negative for  "dizziness, tremors, seizures, syncope, facial asymmetry, speech difficulty, weakness, light-headedness, numbness and headaches.   Hematological:  Negative for adenopathy. Does not bruise/bleed easily.   Psychiatric/Behavioral:  Negative for agitation, behavioral problems, confusion, dysphoric mood, hallucinations, self-injury, sleep disturbance and suicidal ideas. The patient is not nervous/anxious.        Objective   /78 (BP Location: Left arm, Patient Position: Sitting, BP Cuff Size: Large adult)   Pulse 50   Temp 36.3 °C (97.3 °F) (Temporal)   Resp 18   Ht 1.651 m (5' 5\")   Wt 78.9 kg (174 lb)   SpO2 97%   BMI 28.96 kg/m²   Physical Exam  Constitutional:       General: She is not in acute distress.     Appearance: She is obese. She is not ill-appearing or diaphoretic.   HENT:      Head: Normocephalic and atraumatic.      Right Ear: External ear normal.      Left Ear: External ear normal.      Nose: Nose normal. No rhinorrhea.   Eyes:      General: Lids are normal. No scleral icterus.        Right eye: No discharge.         Left eye: No discharge.      Conjunctiva/sclera: Conjunctivae normal.   Cardiovascular:      Rate and Rhythm: Normal rate and regular rhythm.      Pulses: Normal pulses.      Heart sounds: No murmur heard.  Pulmonary:      Effort: Pulmonary effort is normal. No respiratory distress.      Breath sounds: No decreased breath sounds, wheezing, rhonchi or rales.   Abdominal:      General: Bowel sounds are normal. There is no distension.      Palpations: Abdomen is soft. There is no mass.      Tenderness: There is no abdominal tenderness. There is no guarding or rebound.   Musculoskeletal:         General: No swelling, tenderness or deformity.      Cervical back: No rigidity or tenderness.      Right lower leg: No edema.      Left lower leg: No edema.   Lymphadenopathy:      Cervical: No cervical adenopathy.      Upper Body:      Right upper body: No supraclavicular adenopathy.      Left " No upper body: No supraclavicular adenopathy.   Skin:     General: Skin is warm and dry.      Coloration: Skin is not jaundiced or pale.      Findings: No erythema or lesion.   Neurological:      General: No focal deficit present.      Mental Status: She is alert and oriented to person, place, and time.      Sensory: No sensory deficit.      Motor: No weakness or tremor.      Coordination: Coordination normal.      Gait: Gait normal.   Psychiatric:         Mood and Affect: Mood normal. Affect is not inappropriate.         Behavior: Behavior normal.         Assessment/Plan   Problem List Items Addressed This Visit       Essential hypertension    Relevant Medications    semaglutide (Ozempic) 1 mg/dose (4 mg/3 mL) pen injector (Start on 4/6/2025)    valsartan (Diovan) 320 mg tablet    Other Relevant Orders    Albumin-Creatinine Ratio, Urine Random    CBC and Auto Differential    Comprehensive Metabolic Panel    Hemoglobin A1C    Lipid Panel    Magnesium    TSH with reflex to Free T4 if abnormal    Follow Up In [facility] - PCP - Established    Hyperlipidemia    Relevant Medications    semaglutide (Ozempic) 1 mg/dose (4 mg/3 mL) pen injector (Start on 4/6/2025)    Other Relevant Orders    Comprehensive Metabolic Panel    Lipid Panel    TSH with reflex to Free T4 if abnormal    Follow Up In [facility] - PCP - Established     Other Visit Diagnoses       Routine general medical examination at a health care facility    -  Primary    Overweight with body mass index (BMI) of 28 to 28.9 in adult        Relevant Medications    semaglutide (Ozempic) 1 mg/dose (4 mg/3 mL) pen injector (Start on 4/6/2025)    Other Relevant Orders    Albumin-Creatinine Ratio, Urine Random    CBC and Auto Differential    Comprehensive Metabolic Panel    Hemoglobin A1C    Lipid Panel    Magnesium    TSH with reflex to Free T4 if abnormal    Follow Up In [facility] - PCP - Established             2. Patient  Counseling:  --Nutrition: Stressed importance of moderation in sodium/caffeine intake, saturated fat and cholesterol, caloric balance, sufficient intake of fresh fruits, vegetables, fiber, calcium, iron.  --Exercise: Stressed the importance of regular exercise.   --Substance Abuse: Discussed cessation/primary prevention of tobacco, alcohol, or other drug use; driving or other dangerous activities under the influence; availability of treatment for abuse.   --Injury prevention: Discussed safety belts, safety helmets, smoke detector, smoking near bedding or upholstery.   --Dental health: Discussed importance of regular tooth brushing, flossing, and dental visits.  --Immunizations reviewed.  --Discussed benefits of screening colonoscopy.  Due 8/2033  3. Discussed the patient's BMI with her.  The BMI is above average. The patient received Current weight: 78.9 kg (174 lb)  Weight change since last visit (-) denotes wt loss 4 lbs   Weight loss needed to achieve BMI 25: 24.1 Lbs  Weight loss needed to achieve BMI 30: -5.9 Lbs    Provided instructions on dietary changes  Provided instructions on exercise  Advised to Increase physical activity because they have an above normal BMI.  4. Follow up 6 mos wlabs   No No No

## 2025-04-24 ENCOUNTER — APPOINTMENT (OUTPATIENT)
Dept: PRIMARY CARE | Facility: CLINIC | Age: 60
End: 2025-04-24
Payer: COMMERCIAL

## 2025-04-24 VITALS
SYSTOLIC BLOOD PRESSURE: 146 MMHG | DIASTOLIC BLOOD PRESSURE: 83 MMHG | BODY MASS INDEX: 28.82 KG/M2 | TEMPERATURE: 97.7 F | WEIGHT: 173 LBS | RESPIRATION RATE: 18 BRPM | HEIGHT: 65 IN | HEART RATE: 53 BPM | OXYGEN SATURATION: 98 %

## 2025-04-24 DIAGNOSIS — M99.9 NONALLOPATHIC LESION OF SACROILIAC REGION: Primary | ICD-10-CM

## 2025-04-24 PROCEDURE — 1036F TOBACCO NON-USER: CPT | Performed by: FAMILY MEDICINE

## 2025-04-24 PROCEDURE — 3077F SYST BP >= 140 MM HG: CPT | Performed by: FAMILY MEDICINE

## 2025-04-24 PROCEDURE — 3079F DIAST BP 80-89 MM HG: CPT | Performed by: FAMILY MEDICINE

## 2025-04-24 PROCEDURE — 3008F BODY MASS INDEX DOCD: CPT | Performed by: FAMILY MEDICINE

## 2025-04-24 PROCEDURE — 99212 OFFICE O/P EST SF 10 MIN: CPT | Performed by: FAMILY MEDICINE

## 2025-04-24 ASSESSMENT — ENCOUNTER SYMPTOMS
HEADACHES: 0
PARESIS: 0
BACK PAIN: 1
PARESTHESIAS: 0
DYSURIA: 0
PERIANAL NUMBNESS: 0
WEAKNESS: 0
ABDOMINAL PAIN: 0
WEIGHT LOSS: 0
NUMBNESS: 0
TINGLING: 0
BOWEL INCONTINENCE: 0
FEVER: 0
LEG PAIN: 0

## 2025-04-24 NOTE — PROGRESS NOTES
"Subjective   Patient ID: Aimee Ponce is a 59 y.o. female who presents for Follow-up (Left hip and back pain ).    Back Pain  This is a new problem. The current episode started more than 1 month ago. The problem occurs intermittently. The problem has been waxing and waning since onset. The pain is present in the sacro-iliac. The quality of the pain is described as aching. Radiates to: Left groin. The pain is moderate. The symptoms are aggravated by standing and twisting. Stiffness is present All day. Pertinent negatives include no abdominal pain, bladder incontinence, bowel incontinence, chest pain, dysuria, fever, headaches, leg pain, numbness, paresis, paresthesias, pelvic pain, perianal numbness, tingling, weakness or weight loss. Risk factors include obesity. She has tried analgesics (Back brace) for the symptoms. The treatment provided mild relief.        Review of Systems   Constitutional:  Negative for fever and weight loss.   Cardiovascular:  Negative for chest pain.   Gastrointestinal:  Negative for abdominal pain and bowel incontinence.   Genitourinary:  Negative for bladder incontinence, dysuria and pelvic pain.   Musculoskeletal:  Positive for back pain.   Neurological:  Negative for tingling, weakness, numbness, headaches and paresthesias.       Objective   /83 (BP Location: Right arm, Patient Position: Standing, BP Cuff Size: Large adult)   Pulse 53   Temp 36.5 °C (97.7 °F) (Temporal)   Resp 18   Ht 1.651 m (5' 5\")   Wt 78.5 kg (173 lb)   SpO2 98%   BMI 28.79 kg/m²     Physical Exam  Constitutional:       Appearance: Normal appearance. She is obese.   HENT:      Head: Normocephalic and atraumatic.      Right Ear: External ear normal.      Left Ear: External ear normal.      Nose: Nose normal.   Eyes:      General: No scleral icterus.     Extraocular Movements: Extraocular movements intact.      Conjunctiva/sclera: Conjunctivae normal.      Pupils: Pupils are equal, round, and reactive to " light.   Cardiovascular:      Rate and Rhythm: Normal rate and regular rhythm.   Pulmonary:      Effort: Pulmonary effort is normal.      Breath sounds: Normal breath sounds.   Abdominal:      Palpations: Abdomen is soft.      Tenderness: There is no abdominal tenderness.   Musculoskeletal:      Lumbar back: Tenderness (Left sacroiliac joint-reproduces symptoms exactly) present.        Back:       Comments: Sacrum rotated right on a left superior oblique axis tenderness reproducible in left SI joint and left inguinal ligament.   Skin:     General: Skin is warm and dry.   Neurological:      General: No focal deficit present.      Mental Status: She is alert and oriented to person, place, and time.      Sensory: No sensory deficit.      Motor: No weakness.      Coordination: Coordination normal.      Gait: Gait normal.      Deep Tendon Reflexes: Reflexes normal.   Psychiatric:         Mood and Affect: Mood normal.         Behavior: Behavior normal.         Assessment/Plan   Diagnoses and all orders for this visit:  Nonallopathic lesion of sacroiliac region  Stretching of lumbar, hamstrings and glutes performed.  OMT soft tissue release performed in celiac, superior mesenteric and inferior mesenteric areas.  Residual muscle soreness treated with counterstrain and inguinal and abdominal areas.  Sacral torsion was then directly addressed with muscle energy technique with immediate improvement in symptomatology.  Aftercare instructions were given including completion of daily stretching and isometric exercises and reviewed importance of proper lifting mechanics.  Keep next regular scheduled follow-up appointment

## 2025-05-07 DIAGNOSIS — G43.009 MIGRAINE WITHOUT AURA AND WITHOUT STATUS MIGRAINOSUS, NOT INTRACTABLE: ICD-10-CM

## 2025-05-07 RX ORDER — UBROGEPANT 100 MG/1
100 TABLET ORAL AS NEEDED
Qty: 10 TABLET | Refills: 5 | Status: SHIPPED | OUTPATIENT
Start: 2025-05-07

## 2025-05-19 ENCOUNTER — SPECIALTY PHARMACY (OUTPATIENT)
Dept: PHARMACY | Facility: CLINIC | Age: 60
End: 2025-05-19

## 2025-05-19 PROCEDURE — RXMED WILLOW AMBULATORY MEDICATION CHARGE

## 2025-05-20 ENCOUNTER — PHARMACY VISIT (OUTPATIENT)
Dept: PHARMACY | Facility: CLINIC | Age: 60
End: 2025-05-20
Payer: COMMERCIAL

## 2025-05-20 ENCOUNTER — APPOINTMENT (OUTPATIENT)
Dept: PRIMARY CARE | Facility: CLINIC | Age: 60
End: 2025-05-20
Payer: COMMERCIAL

## 2025-05-21 ENCOUNTER — OFFICE VISIT (OUTPATIENT)
Dept: PRIMARY CARE | Facility: CLINIC | Age: 60
End: 2025-05-21
Payer: COMMERCIAL

## 2025-05-21 ENCOUNTER — HOSPITAL ENCOUNTER (OUTPATIENT)
Dept: RADIOLOGY | Facility: HOSPITAL | Age: 60
Discharge: HOME | End: 2025-05-21
Payer: COMMERCIAL

## 2025-05-21 VITALS
WEIGHT: 176 LBS | OXYGEN SATURATION: 98 % | HEIGHT: 65 IN | BODY MASS INDEX: 29.32 KG/M2 | HEART RATE: 60 BPM | RESPIRATION RATE: 16 BRPM | DIASTOLIC BLOOD PRESSURE: 83 MMHG | TEMPERATURE: 97.5 F | SYSTOLIC BLOOD PRESSURE: 120 MMHG

## 2025-05-21 DIAGNOSIS — M54.50 ACUTE RIGHT-SIDED LOW BACK PAIN WITHOUT SCIATICA: ICD-10-CM

## 2025-05-21 DIAGNOSIS — M54.50 ACUTE RIGHT-SIDED LOW BACK PAIN WITHOUT SCIATICA: Primary | ICD-10-CM

## 2025-05-21 PROCEDURE — 72100 X-RAY EXAM L-S SPINE 2/3 VWS: CPT | Performed by: RADIOLOGY

## 2025-05-21 PROCEDURE — 1036F TOBACCO NON-USER: CPT

## 2025-05-21 PROCEDURE — 3074F SYST BP LT 130 MM HG: CPT

## 2025-05-21 PROCEDURE — 3079F DIAST BP 80-89 MM HG: CPT

## 2025-05-21 PROCEDURE — 72100 X-RAY EXAM L-S SPINE 2/3 VWS: CPT

## 2025-05-21 PROCEDURE — 99213 OFFICE O/P EST LOW 20 MIN: CPT

## 2025-05-21 PROCEDURE — 3008F BODY MASS INDEX DOCD: CPT

## 2025-05-21 RX ORDER — METAXALONE 800 MG/1
800 TABLET ORAL NIGHTLY
Qty: 10 TABLET | Refills: 0 | Status: SHIPPED | OUTPATIENT
Start: 2025-05-21 | End: 2025-05-31

## 2025-05-21 NOTE — PROGRESS NOTES
"Subjective   Patient ID: Aimee Ponce is a 59 y.o. female who presents for Back Pain (Lower back pain for a week, taking tylenol and stretching.  ).    HPI   Patient he is here in office for complaints of lower back pain.  She has had flares of back pain in the past, Dr. Wu has done some manipulation and given her some exercises to do at home.  This flare started 1 week ago, has improved since last week.  Pain is sharp at times, does not radiate anywhere, is tender to touch.  Patient is going to get new shoes and orthostatics today.  She also has a callus on her foot that could use some new shoes.  - Onset: One week.   - Location: Lower back.   - Duration: One week flare. Improved since last week.   - Characteristics: Can be sharp at times, does not radiate anywhere.   - Aggravating factors: Twisting motion.  - Relieving factors: Below treatment.   - Treatment: Tylenol. Lidocaine cream.     Review of Systems  Constitutional: Patient denies any fever, chills, appetite change, fatigue, diaphoresis, or unexpected weight change.  Genitourinary: Patient denies dysuria, flank pain, frequency, hematuria, or urgency.   Musculoskeletal: Reports low back pain, more on right side. Arthritis in neck. No radiating pain down legs or gluteal muscles. Denies joint swelling, No saddle anesthesia, myalgias, neck pain, and neck stiffness.   Neurology: Patient denies any new motor or sensory losses, numbness, tingling, weakness, and incoordination of the extremities, tremors, seizures, dizziness, facial asymmetry, or gait instability.    Objective   /83   Pulse 60   Temp 36.4 °C (97.5 °F) (Temporal)   Resp 16   Ht 1.651 m (5' 5\")   Wt 79.8 kg (176 lb)   SpO2 98%   BMI 29.29 kg/m²     Physical Exam  Constitutional: Awake, alert, and oriented. In no acute distress, no diaphoresis, well-developed, well-nourished. Appears stated age.   Musculoskeletal: Tenderness on palpation mid lower right back.  Pain with left and " right rotation.  No pain on flexion, extension, lateral flexion.  No swelling, edema, rigidity.     Assessment/Plan   Diagnoses and all orders for this visit:  Acute right-sided low back pain without sciatica  -     XR lumbar spine 2-3 views; Future  -     Referral to Physical Therapy; Future  -     metaxalone (Skelaxin) 800 mg tablet; Take 1 tablet (800 mg) by mouth once daily at bedtime for 10 days.  -     Follow Up In [facility] - PCP - Established; Future  - Provider will reach out for x-ray results when resulted, patient is to wear to look out for signs of numbness in bikini area and loss of bladder control.  Patient knows to follow-up with provider if back pain worsens or does not improve after treatment.    Follow Up  -Patient is to keep upcoming appointment with PCP for October with labs done prior.  -Patient is aware to reach back out to provider if symptoms do not improve after treatment is complete.   -Patient is aware to go to the emergency room if the patient would develop fever with worsening symptoms, chest pain, shortness of breath.   -The patient and/or caregiver has verbalized understanding of the above treatment plan and have no further questions at this time.     Aimee Ponce- please be aware that any referrals discussed today in this visit should be placed as well as tests/labs ordered should result in prompt scheduling today. If not done today-then a phone call for scheduling is expected in a timely manner (within 2 weeks). If testing is to be done-a result should be available to patient within 2 weeks time unless otherwise specified.  Please reach out if you have not heard results back within a timely manner.    You, the patient or caregiver, are responsible for making sure what was discussed in this visit is actually scheduled and completed. If suboptimal understanding of results of tests or referral reason- a follow up appointment with me or your primary provider should be made.  If above recommended testing/referrals/labs/treatment ect does NOT occur-you are to connect with us for explanation. Patient understands that should they have testing outside  facilities that we may not receive the results and was told to call us if they have not heard from our office within a week after testing.     Please take into consideration, dragon voice recognition dictation software was utilized partially in the preparation of this note, therefore, inaccuracies in spelling, word choice and punctuation may have occurred which were not recognized at the time of signing.

## 2025-05-26 DIAGNOSIS — E66.811 CLASS 1 OBESITY DUE TO EXCESS CALORIES WITH SERIOUS COMORBIDITY AND BODY MASS INDEX (BMI) OF 34.0 TO 34.9 IN ADULT: ICD-10-CM

## 2025-05-26 DIAGNOSIS — E66.09 CLASS 1 OBESITY DUE TO EXCESS CALORIES WITH SERIOUS COMORBIDITY AND BODY MASS INDEX (BMI) OF 34.0 TO 34.9 IN ADULT: ICD-10-CM

## 2025-05-26 DIAGNOSIS — I10 ESSENTIAL HYPERTENSION: ICD-10-CM

## 2025-05-27 ENCOUNTER — TELEPHONE (OUTPATIENT)
Dept: PHYSICAL THERAPY | Facility: HOSPITAL | Age: 60
End: 2025-05-27
Payer: COMMERCIAL

## 2025-05-28 RX ORDER — HYDRALAZINE HYDROCHLORIDE 50 MG/1
50 TABLET, FILM COATED ORAL 3 TIMES DAILY
Qty: 270 TABLET | Refills: 0 | OUTPATIENT
Start: 2025-05-28

## 2025-05-28 RX ORDER — TOPIRAMATE 50 MG/1
50 TABLET, FILM COATED ORAL 2 TIMES DAILY
Qty: 180 TABLET | Refills: 0 | Status: SHIPPED | OUTPATIENT
Start: 2025-05-28

## 2025-06-04 ENCOUNTER — EVALUATION (OUTPATIENT)
Dept: PHYSICAL THERAPY | Facility: HOSPITAL | Age: 60
End: 2025-06-04
Payer: COMMERCIAL

## 2025-06-04 DIAGNOSIS — M54.50 ACUTE RIGHT-SIDED LOW BACK PAIN WITHOUT SCIATICA: ICD-10-CM

## 2025-06-04 DIAGNOSIS — M54.50 RIGHT LOW BACK PAIN: Primary | ICD-10-CM

## 2025-06-04 PROCEDURE — 97161 PT EVAL LOW COMPLEX 20 MIN: CPT | Mod: GP

## 2025-06-04 PROCEDURE — 97110 THERAPEUTIC EXERCISES: CPT | Mod: GP

## 2025-06-04 ASSESSMENT — ENCOUNTER SYMPTOMS
OCCASIONAL FEELINGS OF UNSTEADINESS: 0
DEPRESSION: 0
LOSS OF SENSATION IN FEET: 0

## 2025-06-04 NOTE — PROGRESS NOTES
[facility]    Physical Therapy Evaluation    Patient Name: Aimee Ponce  MRN: [id number]  Today's Date: 6/4/2025  Time Calculation  Start Time: 1105  Stop Time: 1138  Time Calculation (min): 33 min     Problem List Items Addressed This Visit           ICD-10-CM    Acute right-sided low back pain without sciatica M54.50    Relevant Orders    Follow Up In Physical Therapy    Right low back pain - Primary M54.50    Relevant Orders    Follow Up In Physical Therapy    Follow Up In Physical Therapy     Primary Language: English    Insurance:  Visit number: 1 of 6  Insurance Type: Payor:  EMPLOYEE MEDICAL PLAN / Plan:  EMPLOYEE MEDICAL PLAN TRADITIONAL  / Product Type: *No Product type* /     General:  Reason for visit/Current problem:   1. Right low back pain  Follow Up In Physical Therapy      2. Acute right-sided low back pain without sciatica  Referral to Physical Therapy    Follow Up In Physical Therapy        Referred by: Shruthi Alfred, APR*   Onset Date: 5/21/25  Relevant Information (PMH & Previous Tests/Imaging):   Xray 5/21/25-Mild-to-moderate diffuse lumbar degenerative changes. Mild scoliosis. Minimal anterolisthesis L3-4 from facet arthrosis.  Orthotics B/L plantar fascitis  Chronic pelvic malalignment - ASIS/PSIS level this date no LLD  Medical History[1]    Precautions:  Fall Risk: None    Medical History Form: Reviewed (scanned into chart)     Subjective:  Chief Complaint: Patient presents to clinic R sided low back pain, hx of pelvic instability.  Pt reports pain occationally into L Hip. Pain is greater in low back than L hip.  Current Condition:   Same       Previous Interventions/Treatments: LTR, SKTC from Dr. Wu    Pain:  4/10  Location:  4/10 Left Hip, Right side of back reaches 6-7/10  Discription: aching  Alleviating: Lidocane gel, Muscle relaxer, tylenol    Has a Pelvic stability belt which helps  Did have an occurrence of toe  "numbness  Aggravating: working 12hr shift-fatigue throughout lower LEs, Getting in/out of car/twisting    Red Flags: Do you have any of the following? No  Fever/chills, unexplained weight changes, dizziness/fainting, unexplained change in bowel or bladder functions, unexplained malaise or muscle weakness, night pain/sweats, numbness or tingling    Prior Level of Function (PLOF)  Patient previously independent with all ADLs  Exercise/Physical Activity: NA  Work/School: Nurse - works the floor  Living Environment: Reviewed and no concern    Patient Stated Goal: alleviate pain and restore function    Objective:   Gait: Pt amb without assistive device.WNL  Palpation: L4 UPA for R rotation    Impairments:   MMT    Bilateral  Hip:  Flexion 4/5  Extension 3+/5  ABD 3+/5  ADD 4-/5    Non-involved limb WFL    ROM   Bilateral  Hip:   Flexion:  >100 Degrees  Extension: <5deg Degrees  ABD: FULL  ER: FULL  IR: FULL    Lumbar Spine:  Flexion:  100 %  Extension: 50 %  Rotation: R= 75 % , L=75 %  Sidebending: R=100 %, L=100 %    Decreased knowledge of HEP    Functional Deficits:   Walking, Managing stairs, Getting in/out of bed, and Bending and car transfers    Assessment:   Aimee Ponce presents this date with B/L paraspinal tension which does not decrease in prone.  Pt notes relief with lumbar extension.  Noted limitation in hip ext also.  Pt with B/L glut weakness.  Good svitlana to HEP, post  HEP pt reports \"feeling looser and less pain\" 4/10 to 2/10.    Recommended Treatment:    Therapeutic exercise to improve flexibility/range of motion as well as, targeting surrounding musculature to stabilize the joint and improve function/posture.  Manual therapy to improve mobility and kinematics of the joints and surrounding soft tissue.  Treatment modalities may include: Home program instruction and progression, Neuromuscular re-education, Dry Needling, and Traction    Nustep: (seat #  / Ues # ): Level    x min  Hip Flexor stretch at " step    Tband:  Red Overhead Row x   Red Rows x   Red  Extensions x   Red Loop: Hip Extension x   Red Loop: Hip ABD x   Red  Loop: Sidestepping x   Red  Loop: Monster Walks x   Red Palof  sec x     Prone on Tball:  Shoulder Horiz ABD  Prone on Tball:  Bird Dogs  Prone on Tball:  Hip Ext    Supine:  Bridge with Tband for hip ABD recruitment(HEP)  Hooklying ball squeeze with Hold     Prone Prop 2min x2    Manual:  PA to mid thoracic    Education:  Home exercise program instructed and issued.  Access Code: [email or web address]  URL: [email or web address]  Date: 06/04/2025  Prepared by: Dagmar Amaya-Andrew    Exercises  - Bridge with Hip Abduction and Resistance  - 2 x daily - 7 x weekly - 2 sets - 10 reps  - Prone Press Up On Elbows  - 2 x daily - 7 x weekly - 1 sets - 5 reps - 1 minute hold    Outcome Measures:  Oswestry: 18/50     Plan:  Plan of care was developed with input and agreement by the patient.  1 x week x 6 weeks    Rehab Potential: Good to achieve goals.    Goals:  Short Term Goals:  3 Visits   Pt to be independent and compliant with home exercise program to promote strength, range of motion, balance/prioprioception and improved posture.  Pt to report worst pain 3/10 for 24 hours to improve activities of daily living tolerance.    Long Term Goals:    6 Visits  Pt to have 4/5 strength of Bilateral Hip ABD to improve walking svitlana  Pt to report worst pain 1/10 x3 for 3 consecutive days to improve ADL svitlana  Pt to improve Oswestry score from 18/50 to 12/50 to improve ADLs  Pt to report no radicular pain x4 consecutive days to improve ADLs    Treatment Performed:    Time Calculation  Start Time: 1105  Stop Time: 1138  Time Calculation (min): 33 min  PT Evaluation Time Entry  PT Evaluation (Low) Time Entry: 25  PT Therapeutic Procedures Time Entry  Therapeutic Exercise Time Entry: 8           [1]   Past Medical History:  Diagnosis Date    Abnormal Pap smear of cervix     Atrial fibrillation  (Multi)     Breast cancer     right breast cancer 5/22    Cervicalgia 09/21/2021    Neck pain, chronic    Corns and callosities 03/25/2022    Corn of foot    Encounter for screening for malignant neoplasm of colon 08/17/2020    Colon cancer screening    HPV (human papilloma virus) infection     Hypertension     Ingrowing nail 11/15/2022    Ingrown toenail    Low back pain, unspecified 08/13/2020    Acute right-sided low back pain without sciatica    Meralgia paresthetica, left lower limb 08/13/2020    Meralgia paresthetica of left side    Migraine     Obesity, unspecified 08/31/2022    Class 1 obesity with body mass index (BMI) of 32.0 to 32.9 in adult    Otalgia, left ear 04/20/2020    Left ear pain    Other chronic allergic conjunctivitis 10/06/2021    Other chronic allergic conjunctivitis of both eyes    Other conditions influencing health status     Back sprain    Other conditions influencing health status 02/10/2022    History of cough    Other instability, right ankle 04/21/2021    Other instability, right ankle    Other obesity due to excess calories 06/14/2022    Class 1 obesity due to excess calories with serious comorbidity and body mass index (BMI) of 32.0 to 32.9 in adult    Other specified soft tissue disorders 05/27/2021    Swelling of left foot    Otitis media, unspecified, left ear 04/20/2020    Infection of left ear    Pain in left leg 03/13/2021    Pain of left lower extremity    Pain in unspecified ankle and joints of unspecified foot 04/21/2021    Ankle pain    Pain in unspecified hip 09/21/2022    Hip pain, acute    Personal history of diseases of the skin and subcutaneous tissue 12/07/2021    History of dermatitis    Personal history of irradiation     Personal history of other diseases of the circulatory system 09/14/2022    History of sinoatrial node dysfunction    Personal history of other diseases of the circulatory system 12/01/2021    History of atrial fibrillation    Personal history of  other diseases of the digestive system 03/25/2022    History of constipation    Personal history of other diseases of the female genital tract     History of abnormal cervical Papanicolaou smear    Personal history of other diseases of the respiratory system 02/10/2022    History of sinusitis    Personal history of other diseases of the respiratory system 08/05/2022    History of paranasal sinus congestion    Personal history of other drug therapy 01/18/2022    History of pneumococcal vaccination    Personal history of other endocrine, nutritional and metabolic disease 12/01/2021    History of obesity    Personal history of other medical treatment 10/08/2019    History of screening mammography    Personal history of other specified conditions 11/17/2021    History of edema    Personal history of other specified conditions 09/14/2022    History of palpitations    Personal history of other specified conditions 07/15/2021    History of fatigue    Personal history of other specified conditions 03/25/2022    History of abdominal pain    Personal history of other specified conditions 08/31/2022    History of postnasal drip    Plantar fascial fibromatosis     Plantar fasciitis    Posterior tibial tendinitis, left leg 03/13/2021    Tibialis tendinitis of left lower extremity    Sleep apnea 2021    Spontaneous ecchymoses 11/17/2021    Petechiae    Strain of other muscle(s) and tendon(s) at lower leg level, right leg, initial encounter 06/18/2020    Strain of right calf muscle

## 2025-06-13 ENCOUNTER — TREATMENT (OUTPATIENT)
Dept: PHYSICAL THERAPY | Facility: HOSPITAL | Age: 60
End: 2025-06-13
Payer: COMMERCIAL

## 2025-06-13 DIAGNOSIS — M54.50 RIGHT LOW BACK PAIN: Primary | ICD-10-CM

## 2025-06-13 DIAGNOSIS — M54.50 ACUTE RIGHT-SIDED LOW BACK PAIN WITHOUT SCIATICA: ICD-10-CM

## 2025-06-13 PROCEDURE — 97110 THERAPEUTIC EXERCISES: CPT | Mod: GP,CQ

## 2025-06-13 ASSESSMENT — PAIN - FUNCTIONAL ASSESSMENT: PAIN_FUNCTIONAL_ASSESSMENT: 0-10

## 2025-06-13 ASSESSMENT — PAIN SCALES - GENERAL: PAINLEVEL_OUTOF10: 0 - NO PAIN

## 2025-06-13 NOTE — PROGRESS NOTES
Physical Therapy Treatment    Patient Name: Aimee Ponce  MRN: [id number]  Today's Date: 6/13/2025     PT Therapeutic Procedures Time Entry  Therapeutic Exercise Time Entry: 31                   Current Problem  1. Right low back pain  Follow Up In Physical Therapy      2. Acute right-sided low back pain without sciatica  Follow Up In Physical Therapy          Insurance   Visit number: 2 of 6  Insurance Type: Payor:  EMPLOYEE MEDICAL PLAN        Subjective   Pt stating her back is feeling better. Drove for an hour and didn't have any pain.     Pain  Pain Assessment: 0-10  0-10 (Numeric) Pain Score: 0 - No pain      Objective     Treatments:  Prone on elbows 2 min  Prone hip extension x10  S/L clamshells RTB 2x10  Bridges 2x10  SLR x15  3 way hip YTB x15    Assessment:  Spoke with pt about being placed on hold if she remains pain free. Added more hip strengthening today with good tolerance, did noticed some difficulty with prone hip extension. Continued to deny pain after treatment. Updated HEP and reviewed.     Plan:  Cont to assess symptoms.

## 2025-06-16 ENCOUNTER — APPOINTMENT (OUTPATIENT)
Dept: PODIATRY | Facility: CLINIC | Age: 60
End: 2025-06-16
Payer: COMMERCIAL

## 2025-06-16 ENCOUNTER — TELEMEDICINE (OUTPATIENT)
Dept: NEUROLOGY | Facility: CLINIC | Age: 60
End: 2025-06-16
Payer: COMMERCIAL

## 2025-06-16 DIAGNOSIS — L82.0 SEBORRHEIC KERATOSES, INFLAMED: Primary | ICD-10-CM

## 2025-06-16 DIAGNOSIS — L29.9 ITCHING: ICD-10-CM

## 2025-06-16 DIAGNOSIS — G43.009 MIGRAINE WITHOUT AURA AND WITHOUT STATUS MIGRAINOSUS, NOT INTRACTABLE: Primary | ICD-10-CM

## 2025-06-16 PROCEDURE — 99214 OFFICE O/P EST MOD 30 MIN: CPT | Performed by: NURSE PRACTITIONER

## 2025-06-16 RX ORDER — UBROGEPANT 100 MG/1
100 TABLET ORAL AS NEEDED
Qty: 10 TABLET | Refills: 5 | Status: SHIPPED | OUTPATIENT
Start: 2025-06-16

## 2025-06-16 RX ORDER — ONDANSETRON 4 MG/1
4 TABLET, ORALLY DISINTEGRATING ORAL 3 TIMES DAILY PRN
Qty: 20 TABLET | Refills: 3 | Status: SHIPPED | OUTPATIENT
Start: 2025-06-16

## 2025-06-16 NOTE — PROGRESS NOTES
History Of Present Illness  Aimee Ponce is a 59 y.o. female presenting with painful callous left foot  Patient does have custom fit orthotics.  She also has lymphedema.  She states that she has a pressure point on the plantar surface of her left foot that causes her discomfort.  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  PCP Lang Wu DO  Last visit 5/21/25 with Shruthi ALLISON CNP     Past Medical History  She has a past medical history of Abnormal Pap smear of cervix, Atrial fibrillation (Multi), Breast cancer, Cervicalgia (09/21/2021), Corns and callosities (03/25/2022), Encounter for screening for malignant neoplasm of colon (08/17/2020), HPV (human papilloma virus) infection, Hypertension, Ingrowing nail (11/15/2022), Low back pain, unspecified (08/13/2020), Meralgia paresthetica, left lower limb (08/13/2020), Migraine, Obesity, unspecified (08/31/2022), Otalgia, left ear (04/20/2020), Other chronic allergic conjunctivitis (10/06/2021), Other conditions influencing health status, Other conditions influencing health status (02/10/2022), Other instability, right ankle (04/21/2021), Other obesity due to excess calories (06/14/2022), Other specified soft tissue disorders (05/27/2021), Otitis media, unspecified, left ear (04/20/2020), Pain in left leg (03/13/2021), Pain in unspecified ankle and joints of unspecified foot (04/21/2021), Pain in unspecified hip (09/21/2022), Personal history of diseases of the skin and subcutaneous tissue (12/07/2021), Personal history of irradiation, Personal history of other diseases of the circulatory system (09/14/2022), Personal history of other diseases of the circulatory system (12/01/2021), Personal history of other diseases of the digestive system (03/25/2022), Personal history of other diseases of the female genital tract, Personal history of other diseases of the respiratory system (02/10/2022), Personal history of other diseases of the respiratory  system (08/05/2022), Personal history of other drug therapy (01/18/2022), Personal history of other endocrine, nutritional and metabolic disease (12/01/2021), Personal history of other medical treatment (10/08/2019), Personal history of other specified conditions (11/17/2021), Personal history of other specified conditions (09/14/2022), Personal history of other specified conditions (07/15/2021), Personal history of other specified conditions (03/25/2022), Personal history of other specified conditions (08/31/2022), Plantar fascial fibromatosis, Posterior tibial tendinitis, left leg (03/13/2021), Sleep apnea (2021), Spontaneous ecchymoses (11/17/2021), and Strain of other muscle(s) and tendon(s) at lower leg level, right leg, initial encounter (06/18/2020).    She has no past medical history of antineoplastic chemo.    Surgical History  She has a past surgical history that includes Appendectomy (03/14/2016); Bunionectomy (03/14/2016); Other surgical history (11/17/2021); Other surgical history (11/17/2021); Other surgical history (11/17/2021); Other surgical history (06/22/2022); Breast lumpectomy; Adenoidectomy (Age 6); Colposcopy; Breast biopsy; and Cervical biopsy w/ loop electrode excision.     Social History  She reports that she has never smoked. She has never used smokeless tobacco. She reports current alcohol use. She reports that she does not use drugs.    Family History  Family History[1]     Allergies  Lisinopril    Medications  Current Medications[2]    Review of Systems    REVIEW OF SYSTEMS  GENERAL:  Negative for malaise, significant weight loss, fever  CARDIOVASCULAR: leg swelling   MUSCULOSKELETAL:  Negative for joint pain or swelling, back pain, and muscle pain.  SKIN:  Negative for lesions, rash, and itching  PSYCH:  Negative for sleep disturbance, mood disorder and recent psychosocial stressors  NEURO: Negative, denies any burning, tingling or numbness     Objective:   Vasc: DP and PT pulses are  palpable bilateral.  CFT is less than 3 seconds bilateral.  Skin temperature is warm to cool proximal to distal bilateral.      Neuro:  Light touch is intact to the foot bilateral.  Protective sensation is intact to the foot when tested with the 5.07 SWM bilateral.  There is no clonus noted.  The hallux is downgoing bilateral.      Derm: Nails are normal. Skin is supple with normal texture and turgor noted.  Webspaces are clean, dry and intact bilateral.  There are no hyperkeratoses, ulcerations, verruca or other lesions noted.  Subleft fifth metatarsal base: Patient has lesion (s) on plantar feet that are punctate with nucleated deep painful core      Ortho: Muscle strength is 5/5 for all pedal groups tested.  Ankle joint, subtalar joint, 1st MPJ and lesser MPJ ROM is full and without pain or crepitus.  The foot type is rectus bilateral off weight bearing.  There are no structural deformities noted.    Assessment/Plan     Diagnoses and all orders for this visit:  Seborrheic keratoses, inflamed  Itching      Lesion(s) are excised with scalpel blade.  Cryo treatment is performed for 30 seconds x 3  Examined patient's current inserts.  Feel patient would benefit from a visit to the orthotist to get inserts modified in order to offload fifth metatarsal base.                    [1]   Family History  Problem Relation Name Age of Onset    Hypertension Mother C     Stroke Father Wilberto     Hypertension Father Wilberto     Atrial fibrillation Father Wilberto     Heart disease Father Wilberto     Breast cancer Mother's Sister Nelia    [2]   Current Outpatient Medications   Medication Sig Dispense Refill    artificial tears, dextran-hypomel-glycerin, 0.1-0.3-0.2 % ophthalmic solution Administer 1 drop into both eyes if needed for dry eyes.      calcium carbonate 600 mg calcium (1,500 mg) tablet Take 1 tablet (1,500 mg) by mouth once daily.      cholecalciferol (Vitamin D-3) 50 mcg (2,000 unit) capsule Take 2 capsules (100 mcg) by  mouth early in the morning..      docusate sodium (Colace) 100 mg capsule Take 2 capsules (200 mg) by mouth once daily.      dofetilide (Tikosyn) 500 mcg capsule Take 1 capsule (500 mcg) by mouth 2 times a day. 180 capsule 3    Eliquis 5 mg tablet Take 1 tablet (5 mg) by mouth 2 times a day. 180 tablet 3    hydrALAZINE (Apresoline) 50 mg tablet TAKE 1 TABLET BY MOUTH THREE TIMES A DAY (Patient taking differently: Take 1 tablet (50 mg) by mouth 3 times a day. Pt. Once a day) 270 tablet 0    hydrALAZINE (Apresoline) 50 mg tablet Take 1 tablet (50 mg) by mouth once daily. 30 tablet 11    loratadine (Claritin) 10 mg tablet Take 1 tablet (10 mg) by mouth once daily.      melatonin 1 mg tablet,chewable Chew 2 mg once daily at bedtime.      metaxalone (Skelaxin) 800 mg tablet Take 1 tablet (800 mg) by mouth once daily as needed.      methocarbamol (Robaxin) 500 mg tablet Take 1 tablet (500 mg) by mouth.      metoprolol succinate XL (Toprol-XL) 25 mg 24 hr tablet Take 1 tablet (25 mg) by mouth once daily at bedtime. 90 tablet 3    miconazole (Lotrimin AF) 2 % powder Apply topically 2 times a day. 85 g 11    mometasone (Elocon) 0.1 % cream Apply to under breast and scaly areas of scalp twice a day as needed      multivitamin with minerals (multivitamin-iron-folic acid) tablet Take 1 tablet by mouth once daily.      ondansetron ODT (Zofran-ODT) 4 mg disintegrating tablet Dissolve 1 tablet (4 mg) in the mouth 3 times a day as needed for nausea. 20 tablet 3    rosuvastatin (Crestor) 5 mg tablet TAKE 1 TABLET BY MOUTH EVERY DAY 90 tablet 3    semaglutide (Ozempic) 1 mg/dose (4 mg/3 mL) pen injector Inject 2 mg under the skin 1 (one) time per week. 2 mL 11    topiramate (Topamax) 50 mg tablet TAKE 1 TABLET BY MOUTH TWICE A  tablet 0    Ubrelvy 100 mg tablet Take 1 tablet (100 mg) by mouth if needed (migraine). May repeat x 1 dose in 2 hours.  Max 200mg/24 hr 10 tablet 5    valsartan (Diovan) 320 mg tablet Take 1 tablet  (320 mg) by mouth once daily. 90 tablet 3    fluticasone (Flonase Allergy Relief) 50 mcg/actuation nasal spray Administer 1 spray into each nostril 2 times a day. Shake gently. Before first use, prime pump. After use, clean tip and replace cap. 1 g 0    metaxalone (Skelaxin) 800 mg tablet Take 1 tablet (800 mg) by mouth once daily at bedtime for 10 days. 10 tablet 0     No current facility-administered medications for this visit.

## 2025-06-16 NOTE — PROGRESS NOTES
Virtual or Telephone Consent    An interactive audio and video telecommunication system which permits real time communications between the patient (at the originating site) and provider (at the distant site) was utilized to provide this telehealth service.   Verbal consent was requested and obtained from Aimee Ponce on this date, 06/16/25 for a telehealth visit and the patient's location was confirmed at the time of the visit.      Subjective   HPI  Aimee Ponce is a 59 y.o. year old female who presents with chief complaint Migraine without aura and without status migrainosus, not intractable [G43.009]    Aimee is experiencing  2-3 HA days per month, 2-3 of the HAs meet migraine criteria.   Triggers include stress and diet.  The headaches are usually moderate and dull and are located across forehead, generally symmetric.   Generally, headaches last about 1-2 hours in duration.   With Ubrelvy.  Associated photophobia and nausea  The headaches are not positional.   Headaches are worsened with exertion. No change in character with sneezing, coughing and bending over.   The current rescue medication Ubrelvy starts to take effect within 1-2    hours and decreases the headache by 100%.   Work attendance or other daily activities affected:  uses FMLA  Caffeine:  denies   Sleep:  5-6 hours per night  Compliant with CPAP  The patient denies the presence of any associated double vision, speech problems, confusion, facial or extremity weakness or numbness or problems with coordination. Denies other neurological history of seizures, stroke, or TIA.        Current/ past HA treatments:  Preventive:  Topiramate  Metoprolol    Rescue:  Acetaminophen  Ibuprofen  Triptans contraindicated related to CV status of A-fib  Ubrelvy    Current Medications[1]    RX Allergies[2]    Medical History[3]  Surgical History[4]  Family History[5]  Social History     Tobacco Use    Smoking status: Never    Smokeless tobacco: Never   Substance  Use Topics    Alcohol use: Yes     Comment: once or twice a month     Social History     Substance and Sexual Activity   Drug Use Never        ROS  As noted in HPI, otherwise all other systems have been reviewed are negative for complaint.     Objective   General Appearance: Aimee is well-developed, well-nourished, 59 y.o. year old female, in no acute distress. Makes good eye contact, is alert, interactive, and cooperative. Demonstrates recent & remote memory recall. Subjective information consistent with objective assessment.       Lab Results   Component Value Date    WBC 4.4 03/26/2025    RBC 4.47 03/26/2025    HGB 14.2 03/26/2025    HCT 42.2 03/26/2025     03/26/2025     03/26/2025    K 4.1 03/26/2025     03/26/2025    BUN 17 03/26/2025    CREATININE 0.78 03/26/2025    EGFR 88 03/26/2025    CALCIUM 9.2 03/26/2025    ALKPHOS 67 03/26/2025    AST 16 03/26/2025    ALT 22 03/26/2025    MG 2.3 03/26/2025    VITD25 42 09/25/2024    HGBA1C 5.4 03/27/2024    LDLCALC 99 03/26/2025    CHOL 183 03/26/2025    HDL 66 03/26/2025    TRIG 88 03/26/2025    TSH 0.91 03/26/2025        Neurological Exam  Cranial Nerves  CN VII: Full and symmetric facial movement.  CN VIII: Hearing is normal.    RECORDS REVIEW:  Previous records (provider notes, laboratory reports, and imaging studies were reviewed and summarized).  Labs reviewed.  Previous neuronote reviewed.    Assessment & Plan  Migraine without aura and without status migrainosus, not intractable    Orders:    Ubrelvy 100 mg tablet; Take 1 tablet (100 mg) by mouth if needed (migraine). May repeat x 1 dose in 2 hours.  Max 200mg/24 hr    ondansetron ODT (Zofran-ODT) 4 mg disintegrating tablet; Dissolve 1 tablet (4 mg) in the mouth 3 times a day as needed for nausea.    Follow Up In Neurology; Future         ASSESSMENT/PLAN:  Continue Ubrelvy 100 mg for abortive treatment of migraine.  Continue ondansetron 4 mg as needed for nausea.  Follow-up in 6 months or  sooner if needed.        Shima Tee, APRMARCELLA-CNP           [1]   Current Outpatient Medications:     artificial tears, dextran-hypomel-glycerin, 0.1-0.3-0.2 % ophthalmic solution, Administer 1 drop into both eyes if needed for dry eyes., Disp: , Rfl:     calcium carbonate 600 mg calcium (1,500 mg) tablet, Take 1 tablet (1,500 mg) by mouth once daily., Disp: , Rfl:     cholecalciferol (Vitamin D-3) 50 mcg (2,000 unit) capsule, Take 2 capsules (100 mcg) by mouth early in the morning.., Disp: , Rfl:     docusate sodium (Colace) 100 mg capsule, Take 2 capsules (200 mg) by mouth once daily., Disp: , Rfl:     dofetilide (Tikosyn) 500 mcg capsule, Take 1 capsule (500 mcg) by mouth 2 times a day., Disp: 180 capsule, Rfl: 3    Eliquis 5 mg tablet, Take 1 tablet (5 mg) by mouth 2 times a day., Disp: 180 tablet, Rfl: 3    fluticasone (Flonase Allergy Relief) 50 mcg/actuation nasal spray, Administer 1 spray into each nostril 2 times a day. Shake gently. Before first use, prime pump. After use, clean tip and replace cap., Disp: 1 g, Rfl: 0    hydrALAZINE (Apresoline) 50 mg tablet, TAKE 1 TABLET BY MOUTH THREE TIMES A DAY (Patient taking differently: Take 1 tablet (50 mg) by mouth 3 times a day. Pt. Once a day), Disp: 270 tablet, Rfl: 0    hydrALAZINE (Apresoline) 50 mg tablet, Take 1 tablet (50 mg) by mouth once daily., Disp: 30 tablet, Rfl: 11    loratadine (Claritin) 10 mg tablet, Take 1 tablet (10 mg) by mouth once daily., Disp: , Rfl:     melatonin 1 mg tablet,chewable, Chew 2 mg once daily at bedtime., Disp: , Rfl:     metaxalone (Skelaxin) 800 mg tablet, Take 1 tablet (800 mg) by mouth once daily as needed., Disp: , Rfl:     metaxalone (Skelaxin) 800 mg tablet, Take 1 tablet (800 mg) by mouth once daily at bedtime for 10 days., Disp: 10 tablet, Rfl: 0    methocarbamol (Robaxin) 500 mg tablet, Take 1 tablet (500 mg) by mouth., Disp: , Rfl:     metoprolol succinate XL (Toprol-XL) 25 mg 24 hr tablet, Take 1 tablet (25 mg) by  mouth once daily at bedtime., Disp: 90 tablet, Rfl: 3    miconazole (Lotrimin AF) 2 % powder, Apply topically 2 times a day., Disp: 85 g, Rfl: 11    mometasone (Elocon) 0.1 % cream, Apply to under breast and scaly areas of scalp twice a day as needed, Disp: , Rfl:     multivitamin with minerals (multivitamin-iron-folic acid) tablet, Take 1 tablet by mouth once daily., Disp: , Rfl:     ondansetron ODT (Zofran-ODT) 4 mg disintegrating tablet, Take 1 tablet (4 mg) by mouth 3 times a day as needed for nausea., Disp: 20 tablet, Rfl: 3    rosuvastatin (Crestor) 5 mg tablet, TAKE 1 TABLET BY MOUTH EVERY DAY, Disp: 90 tablet, Rfl: 3    semaglutide (Ozempic) 1 mg/dose (4 mg/3 mL) pen injector, Inject 2 mg under the skin 1 (one) time per week., Disp: 2 mL, Rfl: 11    topiramate (Topamax) 50 mg tablet, TAKE 1 TABLET BY MOUTH TWICE A DAY, Disp: 180 tablet, Rfl: 0    Ubrelvy 100 mg tablet, Take 1 tablet (100 mg) by mouth if needed (migraine). May repeat x 1 dose in 2 hours.  Max 200mg/24 hr, Disp: 10 tablet, Rfl: 5    valsartan (Diovan) 320 mg tablet, Take 1 tablet (320 mg) by mouth once daily., Disp: 90 tablet, Rfl: 3  [2]   Allergies  Allergen Reactions    Lisinopril Cough   [3]   Past Medical History:  Diagnosis Date    Abnormal Pap smear of cervix     Atrial fibrillation (Multi)     Breast cancer     right breast cancer 5/22    Cervicalgia 09/21/2021    Neck pain, chronic    Corns and callosities 03/25/2022    Corn of foot    Encounter for screening for malignant neoplasm of colon 08/17/2020    Colon cancer screening    HPV (human papilloma virus) infection     Hypertension     Ingrowing nail 11/15/2022    Ingrown toenail    Low back pain, unspecified 08/13/2020    Acute right-sided low back pain without sciatica    Meralgia paresthetica, left lower limb 08/13/2020    Meralgia paresthetica of left side    Migraine     Obesity, unspecified 08/31/2022    Class 1 obesity with body mass index (BMI) of 32.0 to 32.9 in adult     Otalgia, left ear 04/20/2020    Left ear pain    Other chronic allergic conjunctivitis 10/06/2021    Other chronic allergic conjunctivitis of both eyes    Other conditions influencing health status     Back sprain    Other conditions influencing health status 02/10/2022    History of cough    Other instability, right ankle 04/21/2021    Other instability, right ankle    Other obesity due to excess calories 06/14/2022    Class 1 obesity due to excess calories with serious comorbidity and body mass index (BMI) of 32.0 to 32.9 in adult    Other specified soft tissue disorders 05/27/2021    Swelling of left foot    Otitis media, unspecified, left ear 04/20/2020    Infection of left ear    Pain in left leg 03/13/2021    Pain of left lower extremity    Pain in unspecified ankle and joints of unspecified foot 04/21/2021    Ankle pain    Pain in unspecified hip 09/21/2022    Hip pain, acute    Personal history of diseases of the skin and subcutaneous tissue 12/07/2021    History of dermatitis    Personal history of irradiation     Personal history of other diseases of the circulatory system 09/14/2022    History of sinoatrial node dysfunction    Personal history of other diseases of the circulatory system 12/01/2021    History of atrial fibrillation    Personal history of other diseases of the digestive system 03/25/2022    History of constipation    Personal history of other diseases of the female genital tract     History of abnormal cervical Papanicolaou smear    Personal history of other diseases of the respiratory system 02/10/2022    History of sinusitis    Personal history of other diseases of the respiratory system 08/05/2022    History of paranasal sinus congestion    Personal history of other drug therapy 01/18/2022    History of pneumococcal vaccination    Personal history of other endocrine, nutritional and metabolic disease 12/01/2021    History of obesity    Personal history of other medical treatment  10/08/2019    History of screening mammography    Personal history of other specified conditions 11/17/2021    History of edema    Personal history of other specified conditions 09/14/2022    History of palpitations    Personal history of other specified conditions 07/15/2021    History of fatigue    Personal history of other specified conditions 03/25/2022    History of abdominal pain    Personal history of other specified conditions 08/31/2022    History of postnasal drip    Plantar fascial fibromatosis     Plantar fasciitis    Posterior tibial tendinitis, left leg 03/13/2021    Tibialis tendinitis of left lower extremity    Sleep apnea 2021    Spontaneous ecchymoses 11/17/2021    Petechiae    Strain of other muscle(s) and tendon(s) at lower leg level, right leg, initial encounter 06/18/2020    Strain of right calf muscle   [4]   Past Surgical History:  Procedure Laterality Date    ADENOIDECTOMY  Age 6    APPENDECTOMY  03/14/2016    Appendectomy    BREAST BIOPSY      BREAST LUMPECTOMY      BUNIONECTOMY  03/14/2016    Simple Bunion Exostectomy ([facility] Procedure)    CERVICAL BIOPSY  W/ LOOP ELECTRODE EXCISION      COLPOSCOPY      OTHER SURGICAL HISTORY  11/17/2021    Tonsillectomy with adenoidectomy    OTHER SURGICAL HISTORY  11/17/2021    Bunionectomy    OTHER SURGICAL HISTORY  11/17/2021    Cervical loop electrosurgical excision    OTHER SURGICAL HISTORY  06/22/2022    Lumpectomy   [5]   Family History  Problem Relation Name Age of Onset    Hypertension Mother C     Stroke Father Wilberto     Hypertension Father Wilberto     Atrial fibrillation Father Wilberto     Heart disease Father Wilberto     Breast cancer Mother's Sister Nelia

## 2025-06-16 NOTE — ASSESSMENT & PLAN NOTE
Orders:    Ubrelvy 100 mg tablet; Take 1 tablet (100 mg) by mouth if needed (migraine). May repeat x 1 dose in 2 hours.  Max 200mg/24 hr    ondansetron ODT (Zofran-ODT) 4 mg disintegrating tablet; Dissolve 1 tablet (4 mg) in the mouth 3 times a day as needed for nausea.    Follow Up In Neurology; Future

## 2025-06-17 ENCOUNTER — TREATMENT (OUTPATIENT)
Dept: PHYSICAL THERAPY | Facility: HOSPITAL | Age: 60
End: 2025-06-17
Payer: COMMERCIAL

## 2025-06-17 DIAGNOSIS — M54.50 RIGHT LOW BACK PAIN: Primary | ICD-10-CM

## 2025-06-17 DIAGNOSIS — M54.50 ACUTE RIGHT-SIDED LOW BACK PAIN WITHOUT SCIATICA: ICD-10-CM

## 2025-06-17 PROCEDURE — 97110 THERAPEUTIC EXERCISES: CPT | Mod: GP,CQ

## 2025-06-17 ASSESSMENT — PAIN DESCRIPTION - DESCRIPTORS: DESCRIPTORS: TIGHTNESS

## 2025-06-17 ASSESSMENT — PAIN SCALES - GENERAL: PAINLEVEL_OUTOF10: 0 - NO PAIN

## 2025-06-17 ASSESSMENT — PAIN - FUNCTIONAL ASSESSMENT: PAIN_FUNCTIONAL_ASSESSMENT: 0-10

## 2025-06-17 NOTE — PROGRESS NOTES
"Physical Therapy Treatment    Patient Name: Aimee Ponce  MRN: [id number]  Today's Date: 6/17/2025                         Current Problem  1. Right low back pain  Follow Up In Physical Therapy      2. Acute right-sided low back pain without sciatica  Follow Up In Physical Therapy          Insurance   Visit number: 3 of 6  Insurance Type: Payor:  EMPLOYEE MEDICAL PLAN        Subjective   Pt stating she just has some stiffness in the morning but otherwise has been feeling good.     Pain  Pain Assessment: 0-10  0-10 (Numeric) Pain Score: 0 - No pain  Pain Descriptors: Tightness      Objective     Treatments:  LTR 10\"x5   Prone hip extension x10  S/L clamshells RTB 2x10  Bridges 2x10  SLR x15  3 way hip YTB x20  Paloff press RTB 3\"x10  OA/OL over PB x10 ea   Assessment:  Added LTR to improve mobility and decrease tightness. Also added paloff press needing cues on correct form, pt reporting shoulder discomfort with exercise so held on giving for HEP. Pt stating she felt more lose after treatment.     Plan:  Progress resistance next visit     "

## 2025-06-25 ENCOUNTER — TREATMENT (OUTPATIENT)
Dept: PHYSICAL THERAPY | Facility: HOSPITAL | Age: 60
End: 2025-06-25
Payer: COMMERCIAL

## 2025-06-25 DIAGNOSIS — M54.50 RIGHT LOW BACK PAIN: Primary | ICD-10-CM

## 2025-06-25 DIAGNOSIS — M54.50 ACUTE RIGHT-SIDED LOW BACK PAIN WITHOUT SCIATICA: ICD-10-CM

## 2025-06-25 PROCEDURE — 97110 THERAPEUTIC EXERCISES: CPT | Mod: GP,CQ

## 2025-06-25 ASSESSMENT — PAIN SCALES - GENERAL: PAINLEVEL_OUTOF10: 0 - NO PAIN

## 2025-06-25 NOTE — PROGRESS NOTES
Physical Therapy Treatment  6/25/2025  Patient Name: Aimee Ponce  MRN: [id number]  Current Problem  1. Right low back pain  Follow Up In Physical Therapy      2. Acute right-sided low back pain without sciatica  Follow Up In Physical Therapy        Insurance    Employee Medical Plan  Visit 4  Subjective   Pt with some stiffness in the AM, but otherwise, feels very good. Some shoulder discomfort with Palof Press, so she hasn't done it.      0-10 (Numeric) Pain Score: 0 - No pain  Objective    Treatments  NuStep, L4, 5 minutes  Squats, GTB, 20 reps  Standing hip 3-way, YTB, 20 reps  Bridges, 20 reps, 3 sec holds  Hook-lying clamshells, GTB, 20 reps, 3 sec holds  Reverse crunches, 10 reps x 2  Quad alt LE extension, 10 reps x 2  Quad cat/cow, 20 reps    Assessment  Pt continues to tolerate treatment well. No pain throughout. Reduction tightness light ROM ex's. Able to progress core/hip stability introduction several ex's. Updated HEP and reviewed.  Plan:  [1] Core and hip strength and stability as tolerated.  [2]  [3]  [4]    OP EDUCATION:    Goals:       Time Calculation  Start Time: 0835  Stop Time: 0915  Time Calculation (min): 40 min  PT Therapeutic Procedures Time Entry  Therapeutic Exercise Time Entry: 40

## 2025-06-30 ENCOUNTER — TREATMENT (OUTPATIENT)
Dept: PHYSICAL THERAPY | Facility: HOSPITAL | Age: 60
End: 2025-06-30
Payer: COMMERCIAL

## 2025-06-30 DIAGNOSIS — M54.50 ACUTE RIGHT-SIDED LOW BACK PAIN WITHOUT SCIATICA: ICD-10-CM

## 2025-06-30 DIAGNOSIS — M54.50 RIGHT LOW BACK PAIN: Primary | ICD-10-CM

## 2025-06-30 PROCEDURE — 97110 THERAPEUTIC EXERCISES: CPT | Mod: GP

## 2025-06-30 NOTE — PROGRESS NOTES
[facility]patient Physical Therapy    Physical Therapy Treatment Note  Patient Name: Aimee Ponce  Primary Language: English  MRN: [id number]  Today's Date: 6/30/2025  Problem List Items Addressed This Visit           ICD-10-CM    Acute right-sided low back pain without sciatica M54.50    Right low back pain - Primary M54.50       Shruthi Alfred K, APR*    Insurance:  Visit number: 5 of 6  Insurance Type: Payor:  EMPLOYEE MEDICAL PLAN / Plan:  EMPLOYEE MEDICAL PLAN TRADITIONAL  / Product Type: *No Product type* /     Pain:  0/10    Treatment:  NuStep, L5, 5 minutes  Hip Flexor stretch [id number]  Blue TBand Overhead row x20  Heel/Toe raise 1/2 roll x20  Red  Loop: Sidestepping x 2laps  Red  Loop: Monster Walks x 3laps  Red Loop: Hip Extension x 20 B/L    Prone on Tball: Rows 2# x20  Prone on Tball: HorizABD x20  Prone on Tball: Birddog 10x2    Squats at plinth(lowest height) GTB, 20 reps    Bridges Grn Tband, 20 reps, 3 sec holds  Hook-lying clamshell ISO GTB, 20 reps  Ballsqueeeze in hooklying 10sec x10    >Reverse crunches, 10 reps x 2  >Quad alt LE extension, 10 reps x 2  >Quad cat/cow, 20 reps    Assessment:Pt reports she was very busy at work last evening but no pain in her back,only fatigue/soreness in feet.  No pain this AM. Pt is independent with HEP.  Pt reports that she has had no episodes of pain since starting PT.    Plan:   Continue with current treatment methods to improve functional mobility and improve ADLs.     Education:  Patient required verbal cues for proper form/technique  Patient required tactile cues for proper form/technique    Goals:  Short Term Goals:  3 Visits   Pt to be independent and compliant with home exercise program to promote strength, range of motion, balance/prioprioception and improved posture.-MET  Pt to report worst pain 3/10 for 24 hours to improve activities of daily living tolerance.-met     Long Term Goals:    6 Visits  Pt to have 4/5 strength  of Bilateral Hip ABD to improve walking svitlana  Pt to report worst pain 1/10 x3 for 3 consecutive days to improve ADL svitlana-MET  Pt to improve Oswestry score from 18/50 to 12/50 to improve ADLs  Pt to report no radicular pain x4 consecutive days to improve ADLs-MET    Billing:  Time Calculation  Start Time: 0817  Stop Time: 0858  Time Calculation (min): 41 min     PT Therapeutic Procedures Time Entry  Therapeutic Exercise Time Entry: 41

## 2025-07-01 ENCOUNTER — HOSPITAL ENCOUNTER (OUTPATIENT)
Dept: RADIOLOGY | Facility: HOSPITAL | Age: 60
Discharge: HOME | End: 2025-07-01
Payer: COMMERCIAL

## 2025-07-01 ENCOUNTER — OFFICE VISIT (OUTPATIENT)
Dept: PRIMARY CARE | Facility: CLINIC | Age: 60
End: 2025-07-01
Payer: COMMERCIAL

## 2025-07-01 VITALS
HEART RATE: 54 BPM | TEMPERATURE: 96.6 F | WEIGHT: 173.9 LBS | BODY MASS INDEX: 28.97 KG/M2 | DIASTOLIC BLOOD PRESSURE: 80 MMHG | RESPIRATION RATE: 16 BRPM | HEIGHT: 65 IN | SYSTOLIC BLOOD PRESSURE: 133 MMHG | OXYGEN SATURATION: 99 %

## 2025-07-01 DIAGNOSIS — G89.29 CHRONIC PAIN OF LEFT KNEE: Primary | ICD-10-CM

## 2025-07-01 DIAGNOSIS — M25.562 ACUTE PAIN OF LEFT KNEE: ICD-10-CM

## 2025-07-01 DIAGNOSIS — M25.562 CHRONIC PAIN OF LEFT KNEE: Primary | ICD-10-CM

## 2025-07-01 PROCEDURE — 73562 X-RAY EXAM OF KNEE 3: CPT | Mod: LT

## 2025-07-01 PROCEDURE — 73562 X-RAY EXAM OF KNEE 3: CPT | Mod: LEFT SIDE | Performed by: RADIOLOGY

## 2025-07-01 PROCEDURE — 1036F TOBACCO NON-USER: CPT

## 2025-07-01 PROCEDURE — 3075F SYST BP GE 130 - 139MM HG: CPT

## 2025-07-01 PROCEDURE — 3008F BODY MASS INDEX DOCD: CPT

## 2025-07-01 PROCEDURE — 99212 OFFICE O/P EST SF 10 MIN: CPT

## 2025-07-01 PROCEDURE — 3079F DIAST BP 80-89 MM HG: CPT

## 2025-07-01 NOTE — PROGRESS NOTES
"Subjective   Patient ID: Aimee Ponce is a 59 y.o. female who presents for Knee Pain (Left knee pain for the last 3-4 weeks, has been putting on lidocaine cream helps a little. Scales 1-10 she's at a 4. When walking or bending knee bothers her. Wants to get looked at.  ).    HPI   Left knee pain.  Patient is here for left knee pain.  Patient is currently doing physical therapy for back pain, patient complaining of left knee pain and physical therapist recommended she get x-rays done.  Patient denies calf pain, shortness of breath, chest pain, increased swelling in that leg or ankle.  - Onset: One month.  - Location: L knee, medial side and underneath.   - Duration: Month.   - Characteristics: Pain medial side of left knee, worsens with certain movements.  No radiating pain, no swelling, no redness.  - Aggravating factors: Certain movements.  - Relieving factors: OTC cream+lidocaine jelly.   - Treatment: OTC cream.     Review of Systems  Constitutional: Patient denies any fever, chills, appetite change, fatigue, diaphoresis, or unexpected weight change.  Musculoskeletal: Knee pain L side.   Neurology: Patient denies any new motor or sensory losses, numbness, tingling, weakness, and incoordination of the extremities, tremors, seizures, dizziness, facial asymmetry, or gait instability.    Objective   /80   Pulse 54   Temp 35.9 °C (96.6 °F) (Temporal)   Resp 16   Ht 1.651 m (5' 5\")   Wt 78.9 kg (173 lb 14.4 oz)   SpO2 99%   BMI 28.94 kg/m²     Physical Exam  Constitutional: Awake, alert, and oriented. In no acute distress, no diaphoresis, well-developed, well-nourished. Appears stated age.   Musculoskeletal: Tenderness on deep palpation of medial left knee.  No fluid pockets palpated.  Negative for pain on knee extension and flexion.  Positive for pain with lateral rotation of the L knee.  Left foot edema +2, this is chronic lymph edema.  Neurological: Alert and oriented ×3, no tremor, normal gait, no " weakness, tremors, or deficit.     Assessment/Plan   Diagnoses and all orders for this visit:  Acute pain of left knee  -Patient can continue to use over-the-counter lidocaine gel for pain.  Patient is to get x-ray of left knee, patient will be notified of results.  Physical therapy order placed for me specifically.  -     XR knee left 3 views; Future  -     Referral to Physical Therapy; Future    Follow Up  -Patient is to keep upcoming appointment with PCP for October with labs done prior.  -Patient is aware to reach back out to provider if symptoms do not improve after treatment is complete.   -Patient is aware to go to the emergency room if the patient would develop fever with worsening symptoms, chest pain, shortness of breath.   -The patient and/or caregiver has verbalized understanding of the above treatment plan and have no further questions at this time.     Aimee Ponce- please be aware that any referrals discussed today in this visit should be placed as well as tests/labs ordered should result in prompt scheduling today. If not done today-then a phone call for scheduling is expected in a timely manner (within 2 weeks). If testing is to be done-a result should be available to patient within 2 weeks time unless otherwise specified.  Please reach out if you have not heard results back within a timely manner.    You, the patient or caregiver, are responsible for making sure what was discussed in this visit is actually scheduled and completed. If suboptimal understanding of results of tests or referral reason- a follow up appointment with me or your primary provider should be made. If above recommended testing/referrals/labs/treatment ect does NOT occur-you are to connect with us for explanation. Patient understands that should they have testing outside  facilities that we may not receive the results and was told to call us if they have not heard from our office within a week after testing.     Please take  into consideration, dragon voice recognition dictation software was utilized partially in the preparation of this note, therefore, inaccuracies in spelling, word choice and punctuation may have occurred which were not recognized at the time of signing.

## 2025-07-07 ENCOUNTER — APPOINTMENT (OUTPATIENT)
Dept: PHYSICAL THERAPY | Facility: HOSPITAL | Age: 60
End: 2025-07-07
Payer: COMMERCIAL

## 2025-07-07 DIAGNOSIS — M54.50 ACUTE RIGHT-SIDED LOW BACK PAIN WITHOUT SCIATICA: ICD-10-CM

## 2025-07-07 DIAGNOSIS — M54.50 RIGHT LOW BACK PAIN: Primary | ICD-10-CM

## 2025-07-09 ENCOUNTER — OFFICE VISIT (OUTPATIENT)
Dept: SURGERY | Facility: HOSPITAL | Age: 60
End: 2025-07-09
Payer: COMMERCIAL

## 2025-07-09 ENCOUNTER — HOSPITAL ENCOUNTER (OUTPATIENT)
Dept: RADIOLOGY | Facility: HOSPITAL | Age: 60
Discharge: HOME | End: 2025-07-09
Payer: COMMERCIAL

## 2025-07-09 VITALS — WEIGHT: 173.4 LBS | HEIGHT: 65 IN | BODY MASS INDEX: 28.89 KG/M2

## 2025-07-09 VITALS — HEIGHT: 65 IN | WEIGHT: 173 LBS | BODY MASS INDEX: 28.82 KG/M2

## 2025-07-09 DIAGNOSIS — C50.911 MALIGNANT NEOPLASM OF RIGHT BREAST IN FEMALE, ESTROGEN RECEPTOR NEGATIVE, UNSPECIFIED SITE OF BREAST: Primary | ICD-10-CM

## 2025-07-09 DIAGNOSIS — Z17.1 MALIGNANT NEOPLASM OF RIGHT BREAST IN FEMALE, ESTROGEN RECEPTOR NEGATIVE, UNSPECIFIED SITE OF BREAST: Primary | ICD-10-CM

## 2025-07-09 DIAGNOSIS — R92.8 ABNORMAL MAMMOGRAM: ICD-10-CM

## 2025-07-09 PROCEDURE — 1036F TOBACCO NON-USER: CPT | Performed by: SURGERY

## 2025-07-09 PROCEDURE — 77061 BREAST TOMOSYNTHESIS UNI: CPT | Mod: RT

## 2025-07-09 PROCEDURE — 99213 OFFICE O/P EST LOW 20 MIN: CPT | Performed by: SURGERY

## 2025-07-09 PROCEDURE — 77061 BREAST TOMOSYNTHESIS UNI: CPT | Mod: RIGHT SIDE | Performed by: STUDENT IN AN ORGANIZED HEALTH CARE EDUCATION/TRAINING PROGRAM

## 2025-07-09 PROCEDURE — 77065 DX MAMMO INCL CAD UNI: CPT | Mod: RIGHT SIDE | Performed by: STUDENT IN AN ORGANIZED HEALTH CARE EDUCATION/TRAINING PROGRAM

## 2025-07-09 PROCEDURE — 3008F BODY MASS INDEX DOCD: CPT | Performed by: SURGERY

## 2025-07-09 ASSESSMENT — ENCOUNTER SYMPTOMS
ENDOCRINE NEGATIVE: 1
PSYCHIATRIC NEGATIVE: 1
MUSCULOSKELETAL NEGATIVE: 1
ALLERGIC/IMMUNOLOGIC NEGATIVE: 1
NEUROLOGICAL NEGATIVE: 1
EYES NEGATIVE: 1
HEMATOLOGIC/LYMPHATIC NEGATIVE: 1
CARDIOVASCULAR NEGATIVE: 1
GASTROINTESTINAL NEGATIVE: 1
CONSTITUTIONAL NEGATIVE: 1
RESPIRATORY NEGATIVE: 1

## 2025-07-09 NOTE — PROGRESS NOTES
Subjective   Patient ID: Aimee Ponce is a 59 y.o. female who presents for No chief complaint on file..  HPI    No new complaints     Interval imagin2022: Bilateral diagnostic mammogram normal category 2 findings.   23: Bilateral diagnostic mammogram normal category 2 findings.   2024: Bilateral diagnostic mammogram with new grouped indeterminate microcalcifications right breast upper outer quadrant middle depth just deep to the prior treatment site.  Category 4.  Status post stereotactic biopsy right breast with benign findings of postradiation change, fibrous tissue.  2025: Diagnostic right mammogram category 2 stable benign findings.      2022: partial mastectomy right breast, final pathology DCIS with clear margins. ER/PRnegative. No complaints.  [id number]     Completed adjuvant radiation 2022     Review of Systems   Constitutional: Negative.    HENT: Negative.     Eyes: Negative.    Respiratory: Negative.     Cardiovascular: Negative.    Gastrointestinal: Negative.    Endocrine: Negative.    Genitourinary: Negative.    Musculoskeletal: Negative.    Skin: Negative.    Allergic/Immunologic: Negative.    Neurological: Negative.    Hematological: Negative.    Psychiatric/Behavioral: Negative.     Past Medical History  Problems    · History of Acute right-sided low back pain without sciatica (724.2) (M54.50)   · Resolved Date: 23 Oct 2020   · History of Ankle pain (719.47) (M25.579)   · Resolved Date: 21 Sep 2022   · History of Back sprain (847.9)   · History of Class 1 obesity due to excess calories with serious comorbidity and body  mass index (BMI) of 32.0 to 32.9 in adult (278.00,V85.32) (E66.09,Z68.32)   · Resolved Date: 14 Sep 2022   · History of Class 1 obesity with body mass index (BMI) of 32.0 to 32.9 in adult  (278.00,V85.32) (E66.9,Z68.32)   · Resolved Date: 14 Sep 2022   · History of Colon cancer screening (V76.51) (Z12.11)   · Resolved Date: 21 Sep 2022   · History of Corn  of foot (700) (L84)   · Resolved Date: 14 Jun 2022   · History of Hip pain, acute (719.45) (M25.559)   · Resolved Date: 14 Dec 2022   · History of abdominal pain (V13.89) (Z87.898)   · Resolved Date: 21 Sep 2022   · History of abnormal cervical Papanicolaou smear (V13.29) (Z87.42)   · History of atrial fibrillation (V12.59) (Z86.79)   · Resolved Date: 14 Jun 2022   · History of constipation (V12.79) (Z87.19)   · Resolved Date: 14 Jun 2022   · History of cough   · Resolved Date: 14 Jun 2022   · History of dermatitis (V13.3) (Z87.2)   · Resolved Date: 14 Jun 2022   · History of edema (V13.89) (Z87.898)   · Resolved Date: 14 Jun 2022   · History of fatigue (V13.89) (Z87.898)   · Resolved Date: 14 Jun 2022   · History of obesity (V12.29) (Z86.39)   · Resolved Date: 14 Jun 2022   · History of palpitations (V12.59) (Z87.898)   · Resolved Date: 14 Dec 2022   · History of paranasal sinus congestion (V12.69) (Z87.09)   · Resolved Date: 21 Sep 2022   · History of pneumococcal vaccination (V49.89) (Z92.29)   · Resolved Date: 21 Sep 2022   · History of postnasal drip (V13.89) (Z87.898)   · Resolved Date: 21 Sep 2022   · History of screening mammography (V15.89) (Z92.89)   · Resolved Date: 21 Sep 2022   · History of sinoatrial node dysfunction (V12.59) (Z86.79)   · Resolved Date: 14 Dec 2022   · History of sinusitis (V12.69) (Z87.09)   · Resolved Date: 14 Jun 2022   · History of Infection of left ear (382.9) (H66.92)   · Resolved Date: 23 Oct 2020   · History of Left ear pain (388.70) (H92.02)   · Resolved Date: 23 Oct 2020   · History of Meralgia paresthetica of left side (355.1) (G57.12)   · Resolved Date: 23 Oct 2020   · History of Neck pain, chronic (723.1,338.29) (M54.2,G89.29)   · Resolved Date: 21 Sep 2022   · History of Other chronic allergic conjunctivitis of both eyes (372.14) (H10.45)   · Resolved Date: 14 Jun 2022   · History of Other instability, right ankle (718.87) (M25.371)   · Resolved Date: 14 Jun 2022   ·  History of Pain of left lower extremity (729.5) (M79.605)   · Resolved Date: 14 Jun 2022   · History of Petechiae (782.7) (R23.3)   · Resolved Date: 14 Jun 2022   · History of Plantar fasciitis (728.71) (M72.2)   · History of Strain of right calf muscle (844.8) (S86.811A)   · Resolved Date: 23 Oct 2020   · History of Swelling of left foot (729.81) (M79.89)   · Resolved Date: 14 Jun 2022   · History of Tibialis tendinitis of left lower extremity (726.72) (M76.822)   · Resolved Date: 14 Jun 2022     Surgical History  Problems    · History of Appendectomy   · History of Bunionectomy   · History of Carpal tunnel surgery   · 12/2022   · History of Cervical loop electrosurgical excision   · History of Lumpectomy   · History of Simple Bunion Exostectomy (Silver Procedure)   · History of Tonsillectomy with adenoidectomy    Family History   Problem Relation Name Age of Onset    Hypertension Mother C     Stroke Father Wilberto     Hypertension Father Wilberto     Atrial fibrillation Father Wilberto     Heart disease Father Wilberto     Breast cancer Mother's Sister [facility]       Social History     Socioeconomic History    Marital status:    Tobacco Use    Smoking status: Never    Smokeless tobacco: Never   Vaping Use    Vaping status: Never Used   Substance and Sexual Activity    Alcohol use: Yes     Comment: once or twice a month    Drug use: Never    Sexual activity: Not Currently     Birth control/protection: None     Social Drivers of Health     Food Insecurity: No Food Insecurity (1/2/2025)    Hunger Vital Sign     Worried About Running Out of Food in the Last Year: Never true     Ran Out of Food in the Last Year: Never true          Current Outpatient Medications:     artificial tears, dextran-hypomel-glycerin, 0.1-0.3-0.2 % ophthalmic solution, Administer 1 drop into both eyes if needed for dry eyes., Disp: , Rfl:     calcium carbonate 600 mg calcium (1,500 mg) tablet, Take 1 tablet (1,500 mg) by mouth once daily., Disp:  , Rfl:     cholecalciferol (Vitamin D-3) 50 mcg (2,000 unit) capsule, Take 2 capsules (100 mcg) by mouth early in the morning.., Disp: , Rfl:     docusate sodium (Colace) 100 mg capsule, Take 2 capsules (200 mg) by mouth once daily., Disp: , Rfl:     dofetilide (Tikosyn) 500 mcg capsule, Take 1 capsule (500 mcg) by mouth 2 times a day., Disp: 180 capsule, Rfl: 3    Eliquis 5 mg tablet, Take 1 tablet (5 mg) by mouth 2 times a day., Disp: 180 tablet, Rfl: 3    fluticasone (Flonase Allergy Relief) 50 mcg/actuation nasal spray, Administer 1 spray into each nostril 2 times a day. Shake gently. Before first use, prime pump. After use, clean tip and replace cap., Disp: 1 g, Rfl: 0    hydrALAZINE (Apresoline) 50 mg tablet, TAKE 1 TABLET BY MOUTH THREE TIMES A DAY (Patient not taking: Reported on 7/1/2025), Disp: 270 tablet, Rfl: 0    hydrALAZINE (Apresoline) 50 mg tablet, Take 1 tablet (50 mg) by mouth once daily., Disp: 30 tablet, Rfl: 11    loratadine (Claritin) 10 mg tablet, Take 1 tablet (10 mg) by mouth once daily., Disp: , Rfl:     melatonin 1 mg tablet,chewable, Chew 2 mg once daily at bedtime., Disp: , Rfl:     metaxalone (Skelaxin) 800 mg tablet, Take 1 tablet (800 mg) by mouth once daily as needed., Disp: , Rfl:     metaxalone (Skelaxin) 800 mg tablet, Take 1 tablet (800 mg) by mouth once daily at bedtime for 10 days., Disp: 10 tablet, Rfl: 0    methocarbamol (Robaxin) 500 mg tablet, Take 1 tablet (500 mg) by mouth., Disp: , Rfl:     metoprolol succinate XL (Toprol-XL) 25 mg 24 hr tablet, Take 1 tablet (25 mg) by mouth once daily at bedtime., Disp: 90 tablet, Rfl: 3    miconazole (Lotrimin AF) 2 % powder, Apply topically 2 times a day., Disp: 85 g, Rfl: 11    mometasone (Elocon) 0.1 % cream, Apply to under breast and scaly areas of scalp twice a day as needed, Disp: , Rfl:     multivitamin with minerals (multivitamin-iron-folic acid) tablet, Take 1 tablet by mouth once daily., Disp: , Rfl:     ondansetron ODT  (Zofran-ODT) 4 mg disintegrating tablet, Dissolve 1 tablet (4 mg) in the mouth 3 times a day as needed for nausea., Disp: 20 tablet, Rfl: 3    rosuvastatin (Crestor) 5 mg tablet, TAKE 1 TABLET BY MOUTH EVERY DAY, Disp: 90 tablet, Rfl: 3    semaglutide (Ozempic) 1 mg/dose (4 mg/3 mL) pen injector, Inject 2 mg under the skin 1 (one) time per week., Disp: 2 mL, Rfl: 11    topiramate (Topamax) 50 mg tablet, TAKE 1 TABLET BY MOUTH TWICE A DAY, Disp: 180 tablet, Rfl: 0    Ubrelvy 100 mg tablet, Take 1 tablet (100 mg) by mouth if needed (migraine). May repeat x 1 dose in 2 hours.  Max 200mg/24 hr, Disp: 10 tablet, Rfl: 5    valsartan (Diovan) 320 mg tablet, Take 1 tablet (320 mg) by mouth once daily., Disp: 90 tablet, Rfl: 3     Objective   There were no vitals filed for this visit.     Physical Exam  Constitutional - General appearance: In no acute distress, well appearing and well nourished.   Neck - Exam: Appearance of the neck was normal. No neck masses and no adenopathy observed.   Pulmonary - Respiratory effort: Normal respiration.   Chest - Breasts: Normal, no dimpling or skin changes appreciated. Right breast incision well-healed. Mild skin pigmentation c/w radiation change.  Assessment/Plan   Problem List Items Addressed This Visit    None    Clinically doing well.  Imaging findings discussed.  Bilateral diagnostic mammogram December 2025 with follow-up.  Chrissy Benavides MD

## 2025-07-10 ENCOUNTER — TREATMENT (OUTPATIENT)
Dept: PHYSICAL THERAPY | Facility: HOSPITAL | Age: 60
End: 2025-07-10
Payer: COMMERCIAL

## 2025-07-10 DIAGNOSIS — M54.50 ACUTE RIGHT-SIDED LOW BACK PAIN WITHOUT SCIATICA: ICD-10-CM

## 2025-07-10 DIAGNOSIS — M54.50 RIGHT LOW BACK PAIN: Primary | ICD-10-CM

## 2025-07-10 PROCEDURE — 97110 THERAPEUTIC EXERCISES: CPT | Mod: GP

## 2025-07-10 NOTE — PROGRESS NOTES
[facility] Outpatient Physical Therapy    Physical Therapy Treatment Note  &  Discharge Summary  Patient Name: Aimee Ponce  Primary Language: English  MRN: [id number]  Today's Date: 7/10/2025  Problem List Items Addressed This Visit           ICD-10-CM    Acute right-sided low back pain without sciatica M54.50    Right low back pain - Primary M54.50       Insurance:  Visit number: 6 of 6  Insurance Type: Payor:  EMPLOYEE MED[employer] PLAN / Plan:  EMPLOYEE MEDICAL PLAN TRADITIONAL  / Product Type: *No Product type* /     Pain:  0/10  Location:  Back  Discription: tightness    Treatment:  Nustep: (seat #7 / Ues #8): Level 5  x5min  Hip Flexor stretch 10sec x5  Green Tband Palof [id number]  Green Tband Horiz ABD x10 B/L  >Blue TBand Overhead row x20  Heel/Toe raise 1/2 roll x20  >Red  Loop: Sidestepping x 2laps  >Red  Loop: Monster Walks x 3laps  >Red Loop: Hip Extension x 20 B/L     Prone on Tball: Rows 2# x20  Prone on Tball: HorizABD 2#x20  Side of plinth: Birddog 10x2     Squats at plinth(lowest height) GTB, 20 reps     Bridges Grn Tband, 10 reps, 10 sec holds  Hook-lying clamshell ISO GTB, 20 reps  Ballsqueeeze in hooklying 10sec x10       Assessment: Oswestry 6/50, only 1 occurrence of pain in 1 wks time. Pain at worst 4-5/10 after 12 hour work day but only occurred 1x in last week.  Pt reports no longer taking meds daily for pain, only 1x in 1 wk.  Pt reports noting HS cramp with bridge activity last evening pt educated to dorsiflex toes to reduce HS.      Plan:   Discharge to home exercise program    Education:  Home exercise program reviewed    Goals:  Short Term Goals:  3 Visits   Pt to be independent and compliant with home exercise program to promote strength, range of motion, balance/prioprioception and improved posture.-MET  Pt to report worst pain 3/10 for 24 hours to improve activities of daily living tolerance.-met     Long Term Goals:    6 Visits  Pt to have 4/5 strength of Bilateral  Hip ABD to improve walking svitlana-MET  Pt to report worst pain 1/10 x3 for 3 consecutive days to improve ADL svitlana-MET  Pt to improve Oswestry score from 18/50 to 12/50 to improve ADLs-MET  Pt to report no radicular pain x4 consecutive days to improve ADLs-MET    Billing:  Time Calculation  Start Time: 1305  Stop Time: 1344  Time Calculation (min): 39 min     PT Therapeutic Procedures Time Entry  Therapeutic Exercise Time Entry: 39

## 2025-07-11 DIAGNOSIS — R92.8 ABNORMAL MAMMOGRAM: Primary | ICD-10-CM

## 2025-07-15 ENCOUNTER — EVALUATION (OUTPATIENT)
Dept: PHYSICAL THERAPY | Facility: HOSPITAL | Age: 60
End: 2025-07-15
Payer: COMMERCIAL

## 2025-07-15 DIAGNOSIS — M25.562 ACUTE PAIN OF LEFT KNEE: Primary | ICD-10-CM

## 2025-07-15 DIAGNOSIS — G89.29 CHRONIC PAIN OF LEFT KNEE: ICD-10-CM

## 2025-07-15 DIAGNOSIS — M25.562 CHRONIC PAIN OF LEFT KNEE: ICD-10-CM

## 2025-07-15 PROCEDURE — 97161 PT EVAL LOW COMPLEX 20 MIN: CPT | Mod: GP | Performed by: PHYSICAL THERAPIST

## 2025-07-15 PROCEDURE — 97110 THERAPEUTIC EXERCISES: CPT | Mod: GP | Performed by: PHYSICAL THERAPIST

## 2025-07-15 ASSESSMENT — PAIN - FUNCTIONAL ASSESSMENT: PAIN_FUNCTIONAL_ASSESSMENT: 0-10

## 2025-07-15 ASSESSMENT — PAIN SCALES - GENERAL: PAINLEVEL_OUTOF10: 0 - NO PAIN
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
## 2025-08-01 ENCOUNTER — TELEPHONE (OUTPATIENT)
Dept: PHYSICAL THERAPY | Facility: HOSPITAL | Age: 60
End: 2025-08-01
Payer: COMMERCIAL

## 2025-08-01 DIAGNOSIS — M54.50 RIGHT LOW BACK PAIN: Primary | ICD-10-CM

## 2025-08-01 DIAGNOSIS — M54.50 ACUTE RIGHT-SIDED LOW BACK PAIN WITHOUT SCIATICA: ICD-10-CM

## 2025-08-04 ENCOUNTER — APPOINTMENT (OUTPATIENT)
Dept: OBSTETRICS AND GYNECOLOGY | Facility: CLINIC | Age: 60
End: 2025-08-04
Payer: COMMERCIAL

## 2025-08-07 ENCOUNTER — TREATMENT (OUTPATIENT)
Dept: PHYSICAL THERAPY | Facility: HOSPITAL | Age: 60
End: 2025-08-07
Payer: COMMERCIAL

## 2025-08-07 DIAGNOSIS — M54.50 ACUTE RIGHT-SIDED LOW BACK PAIN WITHOUT SCIATICA: ICD-10-CM

## 2025-08-07 DIAGNOSIS — M54.50 RIGHT LOW BACK PAIN: Primary | ICD-10-CM

## 2025-08-07 PROCEDURE — 97110 THERAPEUTIC EXERCISES: CPT | Mod: GP,CQ

## 2025-08-07 NOTE — PATIENT INSTRUCTIONS
Access Code: [email or web address]  URL: [email or web address]  Date: 08/07/2025  Prepared by: Ronald Crowell

## 2025-08-07 NOTE — PROGRESS NOTES
Physical Therapy Treatment    Patient Name: Aimee Ponce  MRN: [id number]  YOB: 1965  Encounter Date: 8/7/2025    Time Entry:  Time Calculation  Start Time: 0827  Stop Time: 0910  Time Calculation (min): 43 min     PT Therapeutic Procedures Time Entry  Therapeutic Exercise Time Entry: 40                   Rehab Insurance Information:   Visit Count: 2  POC Visits: 6  Auth Required: Yes  Authorized Visits: 6        Additional Authorization/Insurance Information: [facility]    Rehab Falls Risk Assessment:         Problem List Items Addressed This Visit           ICD-10-CM    Acute right-sided low back pain without sciatica M54.50    Right low back pain - Primary M54.50       Precautions  Left Lower Extremity Weight-Bearing Status: Full weight-bearing         Subjective   Pt feeling pretty good since IE..  Pain reported as 0.           Objective               Activities   Therapeutic Exercise  Therapeutic Exercise Performed: Yes  Therapeutic Exercise Activity 1: NuStep, L3, 10 minutes  Therapeutic Exercise Activity 2: Partial Squats, 20 reps  Therapeutic Exercise Activity 3: HR/TR, 1/2 roll, 20 reps  Therapeutic Exercise Activity 4: Standing hip 3-way, RTB, 20 reps  Therapeutic Exercise Activity 5: Standing slow marching, 20 reps  Therapeutic Exercise Activity 6: Standing HSC, 20 reps  Therapeutic Exercise Activity 7: SLS's, blue foam pad, 10 sec holds, 10 reps  Therapeutic Exercise Activity 8: LAQ's, 2-3 sec holds, 20 reps  Therapeutic Exercise Activity 9: SLR's, flexion and abduction, 20 reps  Therapeutic Exercise Activity 10: Hook-lying, clamshells, RTB, 20 reps                                                            Assessment/Plan   Assessment: Introduced several LE strength and functional mobility ex's. Denies pain throughout. Noted weakness quad and glute med. Updated HEP and reviewed. Patient tolerated treatment well      Plan: Continue LE strength and mobility as tolerated

## 2025-08-11 ENCOUNTER — APPOINTMENT (OUTPATIENT)
Dept: PHYSICAL THERAPY | Facility: HOSPITAL | Age: 60
End: 2025-08-11
Payer: COMMERCIAL

## 2025-08-11 DIAGNOSIS — M54.50 RIGHT LOW BACK PAIN: Primary | ICD-10-CM

## 2025-08-11 DIAGNOSIS — M54.50 ACUTE RIGHT-SIDED LOW BACK PAIN WITHOUT SCIATICA: ICD-10-CM

## 2025-08-20 ENCOUNTER — TREATMENT (OUTPATIENT)
Dept: PHYSICAL THERAPY | Facility: HOSPITAL | Age: 60
End: 2025-08-20
Payer: COMMERCIAL

## 2025-08-20 DIAGNOSIS — G89.29 CHRONIC PAIN OF LEFT KNEE: ICD-10-CM

## 2025-08-20 DIAGNOSIS — M54.50 ACUTE RIGHT-SIDED LOW BACK PAIN WITHOUT SCIATICA: ICD-10-CM

## 2025-08-20 DIAGNOSIS — M54.50 RIGHT LOW BACK PAIN: Primary | ICD-10-CM

## 2025-08-20 DIAGNOSIS — M25.562 CHRONIC PAIN OF LEFT KNEE: ICD-10-CM

## 2025-08-20 PROCEDURE — 97110 THERAPEUTIC EXERCISES: CPT | Mod: GP | Performed by: PHYSICAL THERAPIST

## 2025-08-24 DIAGNOSIS — E66.09 CLASS 1 OBESITY DUE TO EXCESS CALORIES WITH SERIOUS COMORBIDITY AND BODY MASS INDEX (BMI) OF 34.0 TO 34.9 IN ADULT: ICD-10-CM

## 2025-08-24 DIAGNOSIS — E66.811 CLASS 1 OBESITY DUE TO EXCESS CALORIES WITH SERIOUS COMORBIDITY AND BODY MASS INDEX (BMI) OF 34.0 TO 34.9 IN ADULT: ICD-10-CM

## 2025-08-25 ENCOUNTER — APPOINTMENT (OUTPATIENT)
Dept: OBSTETRICS AND GYNECOLOGY | Facility: CLINIC | Age: 60
End: 2025-08-25
Payer: COMMERCIAL

## 2025-08-25 VITALS
WEIGHT: 179.2 LBS | HEIGHT: 65 IN | BODY MASS INDEX: 29.85 KG/M2 | DIASTOLIC BLOOD PRESSURE: 83 MMHG | HEART RATE: 62 BPM | SYSTOLIC BLOOD PRESSURE: 134 MMHG

## 2025-08-25 DIAGNOSIS — Z12.4 PAP SMEAR FOR CERVICAL CANCER SCREENING: ICD-10-CM

## 2025-08-25 DIAGNOSIS — Z01.419 WELL WOMAN EXAM WITH ROUTINE GYNECOLOGICAL EXAM: Primary | ICD-10-CM

## 2025-08-25 PROCEDURE — 3008F BODY MASS INDEX DOCD: CPT | Performed by: ADVANCED PRACTICE MIDWIFE

## 2025-08-25 PROCEDURE — 99396 PREV VISIT EST AGE 40-64: CPT | Performed by: ADVANCED PRACTICE MIDWIFE

## 2025-08-25 PROCEDURE — 1036F TOBACCO NON-USER: CPT | Performed by: ADVANCED PRACTICE MIDWIFE

## 2025-08-25 PROCEDURE — 3075F SYST BP GE 130 - 139MM HG: CPT | Performed by: ADVANCED PRACTICE MIDWIFE

## 2025-08-25 PROCEDURE — 3079F DIAST BP 80-89 MM HG: CPT | Performed by: ADVANCED PRACTICE MIDWIFE

## 2025-08-26 RX ORDER — TOPIRAMATE 50 MG/1
50 TABLET, FILM COATED ORAL 2 TIMES DAILY
Qty: 180 TABLET | Refills: 0 | Status: SHIPPED | OUTPATIENT
Start: 2025-08-26

## 2025-08-27 ENCOUNTER — TREATMENT (OUTPATIENT)
Dept: PHYSICAL THERAPY | Facility: HOSPITAL | Age: 60
End: 2025-08-27
Payer: COMMERCIAL

## 2025-08-27 ENCOUNTER — APPOINTMENT (OUTPATIENT)
Dept: CARDIOLOGY | Facility: CLINIC | Age: 60
End: 2025-08-27
Payer: COMMERCIAL

## 2025-08-27 ENCOUNTER — OFFICE VISIT (OUTPATIENT)
Dept: URGENT CARE | Age: 60
End: 2025-08-27
Payer: COMMERCIAL

## 2025-08-27 VITALS
BODY MASS INDEX: 29.72 KG/M2 | SYSTOLIC BLOOD PRESSURE: 130 MMHG | HEART RATE: 59 BPM | WEIGHT: 178.4 LBS | HEIGHT: 65 IN | DIASTOLIC BLOOD PRESSURE: 78 MMHG

## 2025-08-27 VITALS
RESPIRATION RATE: 16 BRPM | TEMPERATURE: 98 F | HEART RATE: 63 BPM | SYSTOLIC BLOOD PRESSURE: 119 MMHG | DIASTOLIC BLOOD PRESSURE: 82 MMHG | OXYGEN SATURATION: 97 %

## 2025-08-27 DIAGNOSIS — R94.31 ABNORMAL EKG: ICD-10-CM

## 2025-08-27 DIAGNOSIS — M25.562 CHRONIC PAIN OF LEFT KNEE: Primary | ICD-10-CM

## 2025-08-27 DIAGNOSIS — Z79.01 ANTICOAGULATION MANAGEMENT ENCOUNTER: ICD-10-CM

## 2025-08-27 DIAGNOSIS — Z78.9 NEVER SMOKED CIGARETTES: Primary | ICD-10-CM

## 2025-08-27 DIAGNOSIS — Z51.81 ANTICOAGULATION MANAGEMENT ENCOUNTER: ICD-10-CM

## 2025-08-27 DIAGNOSIS — G89.29 CHRONIC PAIN OF LEFT KNEE: Primary | ICD-10-CM

## 2025-08-27 DIAGNOSIS — J06.9 ACUTE URI: Primary | ICD-10-CM

## 2025-08-27 DIAGNOSIS — I10 ESSENTIAL HYPERTENSION: ICD-10-CM

## 2025-08-27 DIAGNOSIS — I48.0 PAROXYSMAL ATRIAL FIBRILLATION WITH RVR (MULTI): ICD-10-CM

## 2025-08-27 DIAGNOSIS — I49.5 SINUS NODE DYSFUNCTION (MULTI): ICD-10-CM

## 2025-08-27 LAB
POC CORONAVIRUS SARS-COV-2 PCR: NEGATIVE
POC HUMAN RHINOVIRUS PCR: NEGATIVE
POC INFLUENZA A VIRUS PCR: NEGATIVE
POC INFLUENZA B VIRUS PCR: NEGATIVE
POC RESPIRATORY SYNCYTIAL VIRUS PCR: NEGATIVE

## 2025-08-27 PROCEDURE — 3075F SYST BP GE 130 - 139MM HG: CPT | Performed by: INTERNAL MEDICINE

## 2025-08-27 PROCEDURE — 87631 RESP VIRUS 3-5 TARGETS: CPT | Performed by: PHYSICIAN ASSISTANT

## 2025-08-27 PROCEDURE — 99213 OFFICE O/P EST LOW 20 MIN: CPT | Performed by: PHYSICIAN ASSISTANT

## 2025-08-27 PROCEDURE — 93000 ELECTROCARDIOGRAM COMPLETE: CPT | Performed by: INTERNAL MEDICINE

## 2025-08-27 PROCEDURE — 3078F DIAST BP <80 MM HG: CPT | Performed by: INTERNAL MEDICINE

## 2025-08-27 PROCEDURE — 99214 OFFICE O/P EST MOD 30 MIN: CPT | Performed by: INTERNAL MEDICINE

## 2025-08-27 PROCEDURE — 3074F SYST BP LT 130 MM HG: CPT | Performed by: PHYSICIAN ASSISTANT

## 2025-08-27 PROCEDURE — 3008F BODY MASS INDEX DOCD: CPT | Performed by: INTERNAL MEDICINE

## 2025-08-27 PROCEDURE — 1036F TOBACCO NON-USER: CPT | Performed by: PHYSICIAN ASSISTANT

## 2025-08-27 PROCEDURE — 1036F TOBACCO NON-USER: CPT | Performed by: INTERNAL MEDICINE

## 2025-08-27 PROCEDURE — 3079F DIAST BP 80-89 MM HG: CPT | Performed by: PHYSICIAN ASSISTANT

## 2025-08-27 RX ORDER — BENZONATATE 100 MG/1
100 CAPSULE ORAL 3 TIMES DAILY PRN
Qty: 21 CAPSULE | Refills: 0 | Status: SHIPPED | OUTPATIENT
Start: 2025-08-27 | End: 2025-09-06

## 2025-08-27 RX ORDER — CALCIUM CARBONATE 300MG(750)
400 TABLET,CHEWABLE ORAL DAILY
COMMUNITY

## 2025-08-27 ASSESSMENT — ENCOUNTER SYMPTOMS
WHEEZING: 0
SYNCOPE: 0
CLAUDICATION: 0
ORTHOPNEA: 0
COUGH: 1
NEAR-SYNCOPE: 0
CARDIOVASCULAR NEGATIVE: 1
SHORTNESS OF BREATH: 0
SORE THROAT: 1
SNORING: 0
IRREGULAR HEARTBEAT: 0
COUGH: 1
PND: 0

## 2025-09-03 LAB
CYTOLOGY CMNT CVX/VAG CYTO-IMP: NORMAL
HPV HR 12 DNA GENITAL QL NAA+PROBE: NEGATIVE
HPV HR GENOTYPES PNL CVX NAA+PROBE: NEGATIVE
HPV16 DNA SPEC QL NAA+PROBE: NEGATIVE
HPV18 DNA SPEC QL NAA+PROBE: NEGATIVE
LAB AP HPV GENOTYPE QUESTION: YES
LAB AP HPV HR: NORMAL
LABORATORY COMMENT REPORT: NORMAL
PATH REPORT.TOTAL CANCER: NORMAL

## 2025-10-06 ENCOUNTER — APPOINTMENT (OUTPATIENT)
Dept: PRIMARY CARE | Facility: CLINIC | Age: 60
End: 2025-10-06
Payer: COMMERCIAL

## 2026-02-25 ENCOUNTER — APPOINTMENT (OUTPATIENT)
Dept: CARDIOLOGY | Facility: CLINIC | Age: 61
End: 2026-02-25
Payer: COMMERCIAL